# Patient Record
Sex: MALE | Employment: FULL TIME | URBAN - NONMETROPOLITAN AREA
[De-identification: names, ages, dates, MRNs, and addresses within clinical notes are randomized per-mention and may not be internally consistent; named-entity substitution may affect disease eponyms.]

---

## 2017-01-03 ENCOUNTER — TELEPHONE (OUTPATIENT)
Dept: PRIMARY CARE CLINIC | Age: 52
End: 2017-01-03

## 2017-01-05 RX ORDER — VARDENAFIL HYDROCHLORIDE 20 MG/1
20 TABLET ORAL PRN
Qty: 6 TABLET | Refills: 3 | Status: SHIPPED | OUTPATIENT
Start: 2017-01-05 | End: 2018-03-02 | Stop reason: SDUPTHER

## 2017-01-23 RX ORDER — ESOMEPRAZOLE MAGNESIUM 40 MG/1
40 CAPSULE, DELAYED RELEASE ORAL DAILY
Qty: 90 CAPSULE | Refills: 0 | Status: SHIPPED | OUTPATIENT
Start: 2017-01-23 | End: 2017-03-20 | Stop reason: SDUPTHER

## 2017-01-23 RX ORDER — LISINOPRIL 20 MG/1
TABLET ORAL
Qty: 90 TABLET | Refills: 0 | Status: SHIPPED | OUTPATIENT
Start: 2017-01-23 | End: 2017-03-20 | Stop reason: SDUPTHER

## 2017-01-23 RX ORDER — NEBIVOLOL 5 MG/1
TABLET ORAL
Qty: 90 TABLET | Refills: 0 | Status: SHIPPED | OUTPATIENT
Start: 2017-01-23 | End: 2017-03-20 | Stop reason: SDUPTHER

## 2017-03-14 ENCOUNTER — TELEPHONE (OUTPATIENT)
Dept: PRIMARY CARE CLINIC | Age: 52
End: 2017-03-14

## 2017-03-14 DIAGNOSIS — N52.9 ERECTILE DYSFUNCTION, UNSPECIFIED ERECTILE DYSFUNCTION TYPE: ICD-10-CM

## 2017-03-14 DIAGNOSIS — E78.2 MIXED HYPERLIPIDEMIA: ICD-10-CM

## 2017-03-14 DIAGNOSIS — Z00.00 ANNUAL PHYSICAL EXAM: ICD-10-CM

## 2017-03-14 DIAGNOSIS — I10 ESSENTIAL HYPERTENSION: Primary | ICD-10-CM

## 2017-03-14 DIAGNOSIS — I10 ESSENTIAL HYPERTENSION: ICD-10-CM

## 2017-03-14 LAB
ALBUMIN SERPL-MCNC: 4.5 G/DL (ref 3.5–5.2)
ALP BLD-CCNC: 64 U/L (ref 40–130)
ALT SERPL-CCNC: 17 U/L (ref 5–41)
ANION GAP SERPL CALCULATED.3IONS-SCNC: 13 MMOL/L (ref 7–19)
AST SERPL-CCNC: 26 U/L (ref 5–40)
BASOPHILS ABSOLUTE: 0 K/UL (ref 0–0.2)
BASOPHILS RELATIVE PERCENT: 0.2 % (ref 0–1)
BILIRUB SERPL-MCNC: 0.6 MG/DL (ref 0.2–1.2)
BUN BLDV-MCNC: 15 MG/DL (ref 6–20)
CALCIUM SERPL-MCNC: 9.3 MG/DL (ref 8.6–10)
CHLORIDE BLD-SCNC: 103 MMOL/L (ref 98–111)
CHOLESTEROL, TOTAL: 123 MG/DL (ref 160–199)
CO2: 26 MMOL/L (ref 22–29)
CREAT SERPL-MCNC: 0.8 MG/DL (ref 0.5–1.2)
CREATININE URINE: 205.1 MG/DL (ref 4.2–622)
EOSINOPHILS ABSOLUTE: 0.2 K/UL (ref 0–0.6)
EOSINOPHILS RELATIVE PERCENT: 2.4 % (ref 0–5)
GFR NON-AFRICAN AMERICAN: >60
GLOBULIN: 2.9 G/DL
GLUCOSE BLD-MCNC: 99 MG/DL (ref 74–109)
HCT VFR BLD CALC: 44.2 % (ref 42–52)
HDLC SERPL-MCNC: 25 MG/DL (ref 55–121)
HEMOGLOBIN: 14.8 G/DL (ref 14–18)
LDL CHOLESTEROL CALCULATED: 52 MG/DL
LYMPHOCYTES ABSOLUTE: 2.4 K/UL (ref 1.1–4.5)
LYMPHOCYTES RELATIVE PERCENT: 35.9 % (ref 20–40)
MCH RBC QN AUTO: 27.4 PG (ref 27–31)
MCHC RBC AUTO-ENTMCNC: 33.5 G/DL (ref 33–37)
MCV RBC AUTO: 81.7 FL (ref 80–94)
MICROALBUMIN UR-MCNC: 16.9 MG/DL (ref 0–19)
MICROALBUMIN/CREAT UR-RTO: 82.4 MG/G
MONOCYTES ABSOLUTE: 0.6 K/UL (ref 0–0.9)
MONOCYTES RELATIVE PERCENT: 8.5 % (ref 0–10)
NEUTROPHILS ABSOLUTE: 3.5 K/UL (ref 1.5–7.5)
NEUTROPHILS RELATIVE PERCENT: 53 % (ref 50–65)
PDW BLD-RTO: 13.9 % (ref 11.5–14.5)
PLATELET # BLD: 287 K/UL (ref 130–400)
PMV BLD AUTO: 10.2 FL (ref 7.4–10.4)
POTASSIUM SERPL-SCNC: 4.5 MMOL/L (ref 3.5–5)
PROSTATE SPECIFIC ANTIGEN: 0.58 NG/ML (ref 0–4)
RBC # BLD: 5.41 M/UL (ref 4.7–6.1)
SODIUM BLD-SCNC: 142 MMOL/L (ref 136–145)
T4 FREE: 1.2 NG/ML (ref 0.9–1.7)
TOTAL PROTEIN: 7.4 G/DL (ref 6.6–8.7)
TRIGL SERPL-MCNC: 232 MG/DL (ref 150–199)
TSH SERPL DL<=0.05 MIU/L-ACNC: 1.37 UIU/ML (ref 0.27–4.2)
WBC # BLD: 6.6 K/UL (ref 4.8–10.8)

## 2017-03-20 ENCOUNTER — OFFICE VISIT (OUTPATIENT)
Dept: PRIMARY CARE CLINIC | Age: 52
End: 2017-03-20
Payer: OTHER GOVERNMENT

## 2017-03-20 VITALS
BODY MASS INDEX: 30.45 KG/M2 | HEART RATE: 84 BPM | WEIGHT: 237.25 LBS | HEIGHT: 74 IN | DIASTOLIC BLOOD PRESSURE: 96 MMHG | TEMPERATURE: 98.2 F | SYSTOLIC BLOOD PRESSURE: 164 MMHG | OXYGEN SATURATION: 98 %

## 2017-03-20 DIAGNOSIS — I25.10 CORONARY ARTERY DISEASE INVOLVING NATIVE HEART WITHOUT ANGINA PECTORIS, UNSPECIFIED VESSEL OR LESION TYPE: ICD-10-CM

## 2017-03-20 DIAGNOSIS — E78.2 MIXED HYPERLIPIDEMIA: ICD-10-CM

## 2017-03-20 DIAGNOSIS — I10 ESSENTIAL HYPERTENSION: Primary | ICD-10-CM

## 2017-03-20 DIAGNOSIS — L98.9 SKIN LESION: ICD-10-CM

## 2017-03-20 PROCEDURE — 99396 PREV VISIT EST AGE 40-64: CPT | Performed by: NURSE PRACTITIONER

## 2017-03-20 RX ORDER — LISINOPRIL 20 MG/1
20 TABLET ORAL 2 TIMES DAILY
Qty: 180 TABLET | Refills: 3 | Status: SHIPPED | OUTPATIENT
Start: 2017-03-20 | End: 2018-09-08 | Stop reason: SDUPTHER

## 2017-03-20 RX ORDER — ESOMEPRAZOLE MAGNESIUM 40 MG/1
40 CAPSULE, DELAYED RELEASE ORAL DAILY
Qty: 90 CAPSULE | Refills: 3 | Status: SHIPPED | OUTPATIENT
Start: 2017-03-20 | End: 2018-07-10

## 2017-03-20 RX ORDER — NEBIVOLOL 5 MG/1
TABLET ORAL
Qty: 90 TABLET | Refills: 3 | Status: SHIPPED | OUTPATIENT
Start: 2017-03-20 | End: 2017-09-27 | Stop reason: SDUPTHER

## 2017-03-20 ASSESSMENT — ENCOUNTER SYMPTOMS
VOMITING: 0
TROUBLE SWALLOWING: 0
NAUSEA: 0
SORE THROAT: 0
EYE DISCHARGE: 0
SHORTNESS OF BREATH: 0
COUGH: 0
ABDOMINAL PAIN: 0

## 2017-04-26 ENCOUNTER — TELEPHONE (OUTPATIENT)
Dept: PRIMARY CARE CLINIC | Age: 52
End: 2017-04-26

## 2017-07-21 ENCOUNTER — OFFICE VISIT (OUTPATIENT)
Dept: PRIMARY CARE CLINIC | Age: 52
End: 2017-07-21
Payer: OTHER GOVERNMENT

## 2017-07-21 VITALS
DIASTOLIC BLOOD PRESSURE: 80 MMHG | HEART RATE: 72 BPM | HEIGHT: 74 IN | WEIGHT: 227.5 LBS | TEMPERATURE: 98.4 F | SYSTOLIC BLOOD PRESSURE: 130 MMHG | OXYGEN SATURATION: 98 % | BODY MASS INDEX: 29.2 KG/M2

## 2017-07-21 DIAGNOSIS — R19.7 DIARRHEA OF PRESUMED INFECTIOUS ORIGIN: ICD-10-CM

## 2017-07-21 DIAGNOSIS — I10 ESSENTIAL HYPERTENSION: ICD-10-CM

## 2017-07-21 DIAGNOSIS — R10.84 GENERALIZED ABDOMINAL PAIN: Primary | ICD-10-CM

## 2017-07-21 LAB
ANION GAP SERPL CALCULATED.3IONS-SCNC: 12 MMOL/L (ref 7–19)
BUN BLDV-MCNC: 19 MG/DL (ref 6–20)
CALCIUM SERPL-MCNC: 8.9 MG/DL (ref 8.6–10)
CHLORIDE BLD-SCNC: 101 MMOL/L (ref 98–111)
CO2: 23 MMOL/L (ref 22–29)
CREAT SERPL-MCNC: 1.3 MG/DL (ref 0.5–1.2)
GFR NON-AFRICAN AMERICAN: 58
GLUCOSE BLD-MCNC: 76 MG/DL (ref 74–109)
POTASSIUM SERPL-SCNC: 4 MMOL/L (ref 3.5–5)
SODIUM BLD-SCNC: 136 MMOL/L (ref 136–145)

## 2017-07-21 PROCEDURE — 99213 OFFICE O/P EST LOW 20 MIN: CPT | Performed by: NURSE PRACTITIONER

## 2017-07-21 RX ORDER — TRIAMTERENE AND HYDROCHLOROTHIAZIDE 37.5; 25 MG/1; MG/1
1 CAPSULE ORAL EVERY MORNING
COMMUNITY
Start: 2017-04-28 | End: 2019-07-02 | Stop reason: SDUPTHER

## 2017-07-21 RX ORDER — CLOPIDOGREL BISULFATE 75 MG/1
TABLET ORAL
COMMUNITY
Start: 2017-04-28 | End: 2017-07-21

## 2017-07-21 ASSESSMENT — ENCOUNTER SYMPTOMS
EYE REDNESS: 0
RHINORRHEA: 0
VOMITING: 0
COUGH: 0
ABDOMINAL PAIN: 0
DIARRHEA: 0
SHORTNESS OF BREATH: 0
NAUSEA: 0
SORE THROAT: 0
CONSTIPATION: 0

## 2017-07-24 ENCOUNTER — TELEPHONE (OUTPATIENT)
Dept: PRIMARY CARE CLINIC | Age: 52
End: 2017-07-24

## 2017-08-10 ENCOUNTER — TELEPHONE (OUTPATIENT)
Dept: PRIMARY CARE CLINIC | Age: 52
End: 2017-08-10

## 2017-08-10 DIAGNOSIS — R79.89 ELEVATED SERUM CREATININE: Primary | ICD-10-CM

## 2017-08-11 ENCOUNTER — TELEPHONE (OUTPATIENT)
Dept: PRIMARY CARE CLINIC | Age: 52
End: 2017-08-11

## 2017-08-11 DIAGNOSIS — R79.89 ELEVATED SERUM CREATININE: ICD-10-CM

## 2017-08-11 LAB
ALBUMIN SERPL-MCNC: 4.2 G/DL (ref 3.5–5.2)
ALP BLD-CCNC: 60 U/L (ref 40–130)
ALT SERPL-CCNC: 24 U/L (ref 5–41)
ANION GAP SERPL CALCULATED.3IONS-SCNC: 19 MMOL/L (ref 7–19)
AST SERPL-CCNC: 23 U/L (ref 5–40)
BILIRUB SERPL-MCNC: 0.6 MG/DL (ref 0.2–1.2)
BUN BLDV-MCNC: 14 MG/DL (ref 6–20)
CALCIUM SERPL-MCNC: 9.6 MG/DL (ref 8.6–10)
CHLORIDE BLD-SCNC: 103 MMOL/L (ref 98–111)
CO2: 22 MMOL/L (ref 22–29)
CREAT SERPL-MCNC: 1 MG/DL (ref 0.5–1.2)
GFR NON-AFRICAN AMERICAN: >60
GLUCOSE BLD-MCNC: 120 MG/DL (ref 74–109)
POTASSIUM SERPL-SCNC: 4 MMOL/L (ref 3.5–5)
SODIUM BLD-SCNC: 144 MMOL/L (ref 136–145)
TOTAL PROTEIN: 7.4 G/DL (ref 6.6–8.7)

## 2017-09-27 ENCOUNTER — OFFICE VISIT (OUTPATIENT)
Dept: PRIMARY CARE CLINIC | Age: 52
End: 2017-09-27
Payer: OTHER GOVERNMENT

## 2017-09-27 VITALS
HEART RATE: 63 BPM | WEIGHT: 223.5 LBS | TEMPERATURE: 98.6 F | DIASTOLIC BLOOD PRESSURE: 90 MMHG | BODY MASS INDEX: 28.68 KG/M2 | SYSTOLIC BLOOD PRESSURE: 140 MMHG | OXYGEN SATURATION: 98 % | HEIGHT: 74 IN

## 2017-09-27 DIAGNOSIS — G47.19 EXCESSIVE DAYTIME SLEEPINESS: ICD-10-CM

## 2017-09-27 DIAGNOSIS — R06.83 SNORING: ICD-10-CM

## 2017-09-27 DIAGNOSIS — I10 ESSENTIAL HYPERTENSION: Primary | ICD-10-CM

## 2017-09-27 PROCEDURE — 99213 OFFICE O/P EST LOW 20 MIN: CPT | Performed by: NURSE PRACTITIONER

## 2017-09-27 RX ORDER — NEBIVOLOL 10 MG/1
TABLET ORAL
Qty: 90 TABLET | Refills: 3 | Status: SHIPPED | OUTPATIENT
Start: 2017-09-27 | End: 2018-10-14 | Stop reason: SDUPTHER

## 2017-09-27 ASSESSMENT — ENCOUNTER SYMPTOMS
NAUSEA: 0
ABDOMINAL PAIN: 0
SHORTNESS OF BREATH: 0
SORE THROAT: 0
TROUBLE SWALLOWING: 0
RHINORRHEA: 0
CONSTIPATION: 0
DIARRHEA: 0
COUGH: 0
VOMITING: 0

## 2018-03-05 RX ORDER — VARDENAFIL HYDROCHLORIDE 20 MG/1
20 TABLET ORAL PRN
Qty: 6 TABLET | Refills: 3 | Status: SHIPPED | OUTPATIENT
Start: 2018-03-05 | End: 2018-07-10

## 2018-07-10 ENCOUNTER — OFFICE VISIT (OUTPATIENT)
Dept: PRIMARY CARE CLINIC | Age: 53
End: 2018-07-10
Payer: OTHER GOVERNMENT

## 2018-07-10 ENCOUNTER — TELEPHONE (OUTPATIENT)
Dept: PRIMARY CARE CLINIC | Age: 53
End: 2018-07-10

## 2018-07-10 VITALS
SYSTOLIC BLOOD PRESSURE: 130 MMHG | HEART RATE: 82 BPM | WEIGHT: 220 LBS | DIASTOLIC BLOOD PRESSURE: 80 MMHG | BODY MASS INDEX: 28.23 KG/M2 | HEIGHT: 74 IN | TEMPERATURE: 98 F | OXYGEN SATURATION: 99 %

## 2018-07-10 DIAGNOSIS — E78.2 MIXED HYPERLIPIDEMIA: ICD-10-CM

## 2018-07-10 DIAGNOSIS — R79.89 CREATININE ELEVATION: ICD-10-CM

## 2018-07-10 DIAGNOSIS — Z12.5 SCREENING PSA (PROSTATE SPECIFIC ANTIGEN): ICD-10-CM

## 2018-07-10 DIAGNOSIS — I10 ESSENTIAL HYPERTENSION: ICD-10-CM

## 2018-07-10 DIAGNOSIS — R10.31 RIGHT LOWER QUADRANT ABDOMINAL PAIN: Primary | ICD-10-CM

## 2018-07-10 LAB
ALBUMIN SERPL-MCNC: 4.4 G/DL (ref 3.5–5.2)
ALP BLD-CCNC: 55 U/L (ref 40–130)
ALT SERPL-CCNC: 16 U/L (ref 5–41)
ANION GAP SERPL CALCULATED.3IONS-SCNC: 16 MMOL/L (ref 7–19)
AST SERPL-CCNC: 20 U/L (ref 5–40)
BASOPHILS ABSOLUTE: 0 K/UL (ref 0–0.2)
BASOPHILS RELATIVE PERCENT: 0.5 % (ref 0–1)
BILIRUB SERPL-MCNC: 0.3 MG/DL (ref 0.2–1.2)
BUN BLDV-MCNC: 22 MG/DL (ref 6–20)
CALCIUM SERPL-MCNC: 9.7 MG/DL (ref 8.6–10)
CHLORIDE BLD-SCNC: 101 MMOL/L (ref 98–111)
CHOLESTEROL, TOTAL: 128 MG/DL (ref 160–199)
CO2: 23 MMOL/L (ref 22–29)
CREAT SERPL-MCNC: 1.1 MG/DL (ref 0.5–1.2)
EOSINOPHILS ABSOLUTE: 0.5 K/UL (ref 0–0.6)
EOSINOPHILS RELATIVE PERCENT: 6.5 % (ref 0–5)
GFR NON-AFRICAN AMERICAN: >60
GLUCOSE BLD-MCNC: 97 MG/DL (ref 74–109)
HCT VFR BLD CALC: 39.9 % (ref 42–52)
HDLC SERPL-MCNC: 27 MG/DL (ref 55–121)
HEMOGLOBIN: 12.8 G/DL (ref 14–18)
LDL CHOLESTEROL CALCULATED: 53 MG/DL
LYMPHOCYTES ABSOLUTE: 1.9 K/UL (ref 1.1–4.5)
LYMPHOCYTES RELATIVE PERCENT: 26.2 % (ref 20–40)
MCH RBC QN AUTO: 26.9 PG (ref 27–31)
MCHC RBC AUTO-ENTMCNC: 32.1 G/DL (ref 33–37)
MCV RBC AUTO: 83.8 FL (ref 80–94)
MONOCYTES ABSOLUTE: 0.5 K/UL (ref 0–0.9)
MONOCYTES RELATIVE PERCENT: 7.2 % (ref 0–10)
NEUTROPHILS ABSOLUTE: 4.4 K/UL (ref 1.5–7.5)
NEUTROPHILS RELATIVE PERCENT: 58.9 % (ref 50–65)
PDW BLD-RTO: 14.6 % (ref 11.5–14.5)
PLATELET # BLD: 276 K/UL (ref 130–400)
PMV BLD AUTO: 10 FL (ref 9.4–12.4)
POTASSIUM SERPL-SCNC: 4.5 MMOL/L (ref 3.5–5)
PROSTATE SPECIFIC ANTIGEN: 0.72 NG/ML (ref 0–4)
RBC # BLD: 4.76 M/UL (ref 4.7–6.1)
SODIUM BLD-SCNC: 140 MMOL/L (ref 136–145)
T4 FREE: 1.2 NG/DL (ref 0.9–1.7)
TOTAL PROTEIN: 7.2 G/DL (ref 6.6–8.7)
TRIGL SERPL-MCNC: 241 MG/DL (ref 0–149)
TSH SERPL DL<=0.05 MIU/L-ACNC: 1.05 UIU/ML (ref 0.27–4.2)
WBC # BLD: 7.4 K/UL (ref 4.8–10.8)

## 2018-07-10 PROCEDURE — 99214 OFFICE O/P EST MOD 30 MIN: CPT | Performed by: NURSE PRACTITIONER

## 2018-07-10 RX ORDER — PANTOPRAZOLE SODIUM 20 MG/1
40 TABLET, DELAYED RELEASE ORAL DAILY
COMMUNITY
End: 2019-06-05 | Stop reason: DRUGHIGH

## 2018-07-10 ASSESSMENT — ENCOUNTER SYMPTOMS
EYE DISCHARGE: 0
SORE THROAT: 0
RHINORRHEA: 0
BLOOD IN STOOL: 0
CONSTIPATION: 0
WHEEZING: 0
CHOKING: 0
EYE REDNESS: 0
ABDOMINAL PAIN: 1
COUGH: 0
DIARRHEA: 1

## 2018-07-10 NOTE — PROGRESS NOTES
Jeanette [address]  [place], [address] [place], [place] [address]  Phone [phone]   Fax [phone]      OFFICE VISIT: 7/10/2018    Danish Woodson is a 45 y.o. male who presents today for his medical conditions/complaints as noted below. Danish Woodson is c/o of Abdominal Pain (right sided abd pain ) and Follow-Up from [facility])        HPI:     Hypertension   This is a chronic problem. The current episode started more than 1 year ago. The problem is controlled. Pertinent negatives include no chest pain. Past treatments include beta blockers. There are no compliance problems. Went to the ER [facility]/2/[facility] ER  Had CT abd and pelvis  Didn't really see anything acute  CT impression. Has colonic diverticulosis. No stones  Normal appendix. Was dehydrated. -cr was elevated at 1.5  Had diarrhea during the past week, but more solid now. No nausea  No blood in the stool. Pain comes and goes. No fever or chills. Just haven't felt good. Drained    Past Medical History:   Diagnosis Date    ED (erectile dysfunction)     GERD (gastroesophageal reflux disease)     Hyperlipidemia     Hypertension       Past Surgical History:   Procedure Laterality Date    AMPUTATION Left     BACK SURGERY         No family history on file. Social History   Substance Use Topics    Smoking status: Former Smoker    Smokeless tobacco: Never Used    Alcohol use Yes      Comment: rare      Current Outpatient Prescriptions   Medication Sig Dispense Refill    pantoprazole (PROTONIX) 20 MG tablet Take 20 mg by mouth daily      nebivolol (BYSTOLIC) 10 MG tablet TAKE 1 TABLET BY MOUTH DAILY. 90 tablet 3    triamterene-hydrochlorothiazide (DYAZIDE) 37.5-25 MG per capsule Take 1 capsule by mouth every morning       lisinopril (PRINIVIL;ZESTRIL) 20 MG tablet Take 1 tablet by mouth 2 times daily 180 tablet 3    aspirin 81 MG tablet Take 81 mg by mouth daily. No current facility-administered medications for this visit.       No Known 99%   BMI 28.25 kg/m²     Assessment:        ICD-10-CM ICD-9-CM    1. Right lower quadrant abdominal pain R10.31 789.03 CANCELED: CBC Auto Differential      CANCELED: Comprehensive Metabolic Panel   2. Essential hypertension I10 401.9    3. Mixed hyperlipidemia E78.2 272.2 T4, Free      TSH without Reflex      CBC Auto Differential      Comprehensive Metabolic Panel      Lipid Panel   4. Screening PSA (prostate specific antigen) Z12.5 V76.44 PSA Screening   5. Creatinine elevation R79.89 790.99        Plan:    ER records requested and reviewed. Scanned in chart. [address] lab work. abd pain considerations- colitis, gastroenteritis due to diarrhea,    improving- nothing acute on the Ct.   contineu to monitor. Return if symptoms worsen or fail to improve. Orders Placed This Encounter   Procedures    T4, Free     Standing Status:   Future     Number of Occurrences:   1     Standing Expiration Date:   7/10/2019    TSH without Reflex     Standing Status:   Future     Number of Occurrences:   1     Standing Expiration Date:   7/10/2019    CBC Auto Differential     Standing Status:   Future     Number of Occurrences:   1     Standing Expiration Date:   7/10/2019    Comprehensive Metabolic Panel     Standing Status:   Future     Number of Occurrences:   1     Standing Expiration Date:   7/10/2019    Lipid Panel     Standing Status:   Future     Number of Occurrences:   1     Standing Expiration Date:   7/10/2019     Order Specific Question:   Is Patient Fasting?/# of Hours     Answer:   10-12 hours    PSA Screening     Standing Status:   Future     Number of Occurrences:   1     Standing Expiration Date:   7/10/2019     No orders of the defined types were placed in this encounter. Discussed use, benefit, and side effects of prescribed medications. All patient questions answered. Pt voiced understanding. Reviewed health maintenance. .  Patient agreed with treatment plan. Follow up as directed. There are no Patient Instructions on file for this visit.       Electronically signed by JOSÉ MIGUEL Varghese on 7/10/2018 at 10:27 AM

## 2018-07-10 NOTE — TELEPHONE ENCOUNTER
Called patient, spoke with: Patient regarding the results of the patients most recent labs. I advised Patient of Opal Parks recommendations. Patient did voice understanding.

## 2018-07-10 NOTE — TELEPHONE ENCOUNTER
----- Message from JOSÉ MIGUEL Lozada sent at 7/10/2018  2:02 PM CDT -----  Please inform patient results show  cmp is normal- kidney function has returned to normal.  Lipids normal  psa normal  Thyroid normal  A little decrease in [place] but this is increased from the ER  Have him do a fit test.

## 2018-07-11 ENCOUNTER — PROCEDURE VISIT (OUTPATIENT)
Dept: PRIMARY CARE CLINIC | Age: 53
End: 2018-07-11
Payer: OTHER GOVERNMENT

## 2018-07-11 DIAGNOSIS — Z12.11 SCREENING FOR COLON CANCER: Primary | ICD-10-CM

## 2018-07-11 LAB
CONTROL: NORMAL
HEMOCCULT STL QL: NEGATIVE

## 2018-07-11 PROCEDURE — 82274 ASSAY TEST FOR BLOOD FECAL: CPT | Performed by: PEDIATRICS

## 2018-07-12 ENCOUNTER — TELEPHONE (OUTPATIENT)
Dept: PRIMARY CARE CLINIC | Age: 53
End: 2018-07-12

## 2018-07-12 NOTE — TELEPHONE ENCOUNTER
----- Message from JOSÉ MIGUEL Magaña sent at 7/12/2018  7:56 AM CDT -----  Please call patient and let them know results.    Fit testing negative

## 2018-09-08 DIAGNOSIS — I10 ESSENTIAL HYPERTENSION: ICD-10-CM

## 2018-09-10 RX ORDER — LISINOPRIL 20 MG/1
20 TABLET ORAL 2 TIMES DAILY
Qty: 180 TABLET | Refills: 3 | Status: SHIPPED | OUTPATIENT
Start: 2018-09-10 | End: 2020-02-24

## 2018-09-10 NOTE — TELEPHONE ENCOUNTER
Received fax from pharmacy requesting refill on pts medication(s). Pt was last seen in office on 7/10/2018  and has a follow up scheduled for Visit date not found. Will send request to  Dr. Shelbie Landon  for authorization.      Requested Prescriptions     Pending Prescriptions Disp Refills    lisinopril (PRINIVIL;ZESTRIL) 20 MG tablet [Pharmacy Med Name: LISINOPRIL TABS 20MG] 180 tablet 3     Sig: Take 1 tablet by mouth 2 times daily

## 2018-10-14 DIAGNOSIS — I10 ESSENTIAL HYPERTENSION: ICD-10-CM

## 2018-10-15 RX ORDER — NEBIVOLOL 10 MG/1
10 TABLET ORAL DAILY
Qty: 90 TABLET | Refills: 3 | Status: SHIPPED | OUTPATIENT
Start: 2018-10-15 | End: 2019-10-28 | Stop reason: SDUPTHER

## 2018-12-03 RX ORDER — PANTOPRAZOLE SODIUM 40 MG/1
40 TABLET, DELAYED RELEASE ORAL DAILY
Qty: 90 TABLET | Refills: 3 | Status: SHIPPED | OUTPATIENT
Start: 2018-12-03 | End: 2019-10-25 | Stop reason: SDUPTHER

## 2018-12-03 NOTE — TELEPHONE ENCOUNTER
Received fax from pharmacy requesting refill on pts medication(s). Pt was last seen in office on 7/10/2018  and has a follow up scheduled for Visit date not found. Will send request to  [facility]  for patient.      Requested Prescriptions     Pending Prescriptions Disp Refills    pantoprazole (PROTONIX) 40 MG tablet [Pharmacy Med Name: PANTOPRAZOLE SOD DR TABS 40MG] 90 tablet 3     Sig: Take 1 tablet by mouth daily

## 2019-05-29 ENCOUNTER — TELEPHONE (OUTPATIENT)
Dept: PRIMARY CARE CLINIC | Age: 54
End: 2019-05-29

## 2019-05-29 DIAGNOSIS — I10 ESSENTIAL HYPERTENSION: Primary | ICD-10-CM

## 2019-05-29 DIAGNOSIS — E78.2 MIXED HYPERLIPIDEMIA: ICD-10-CM

## 2019-05-29 NOTE — TELEPHONE ENCOUNTER
Leeann Arguello has an upcoming appt on 6/5/2019. Patient is wanting to have labs prior to this appointment, please ensure active orders are available as I have made him aware of our walk-in hours for the lab. Please contact patient to advise. Thank you.

## 2019-05-31 ENCOUNTER — OUTSIDE FACILITY SERVICE (OUTPATIENT)
Dept: CARDIOLOGY | Facility: CLINIC | Age: 54
End: 2019-05-31

## 2019-05-31 PROCEDURE — 93306 TTE W/DOPPLER COMPLETE: CPT | Performed by: INTERNAL MEDICINE

## 2019-06-03 ENCOUNTER — TELEPHONE (OUTPATIENT)
Dept: PRIMARY CARE CLINIC | Age: 54
End: 2019-06-03

## 2019-06-03 DIAGNOSIS — E78.2 MIXED HYPERLIPIDEMIA: ICD-10-CM

## 2019-06-03 DIAGNOSIS — I10 ESSENTIAL HYPERTENSION: ICD-10-CM

## 2019-06-03 DIAGNOSIS — R89.9 ABNORMAL LABORATORY TEST RESULT: Primary | ICD-10-CM

## 2019-06-03 LAB
ALBUMIN SERPL-MCNC: 4.4 G/DL (ref 3.5–5.2)
ALP BLD-CCNC: 69 U/L (ref 40–130)
ALT SERPL-CCNC: 37 U/L (ref 5–41)
ANION GAP SERPL CALCULATED.3IONS-SCNC: 15 MMOL/L (ref 7–19)
AST SERPL-CCNC: 33 U/L (ref 5–40)
BASOPHILS ABSOLUTE: 0 K/UL (ref 0–0.2)
BASOPHILS RELATIVE PERCENT: 0.6 % (ref 0–1)
BILIRUB SERPL-MCNC: 0.3 MG/DL (ref 0.2–1.2)
BUN BLDV-MCNC: 27 MG/DL (ref 6–20)
CALCIUM SERPL-MCNC: 9.5 MG/DL (ref 8.6–10)
CHLORIDE BLD-SCNC: 105 MMOL/L (ref 98–111)
CHOLESTEROL, TOTAL: 166 MG/DL (ref 160–199)
CO2: 22 MMOL/L (ref 22–29)
CREAT SERPL-MCNC: 1.2 MG/DL (ref 0.5–1.2)
EOSINOPHILS ABSOLUTE: 0.4 K/UL (ref 0–0.6)
EOSINOPHILS RELATIVE PERCENT: 5.3 % (ref 0–5)
GFR NON-AFRICAN AMERICAN: >60
GLUCOSE BLD-MCNC: 108 MG/DL (ref 74–109)
HCT VFR BLD CALC: 38.3 % (ref 42–52)
HDLC SERPL-MCNC: 18 MG/DL (ref 55–121)
HEMOGLOBIN: 12 G/DL (ref 14–18)
LDL CHOLESTEROL CALCULATED: ABNORMAL MG/DL
LDL CHOLESTEROL DIRECT: 49 MG/DL
LYMPHOCYTES ABSOLUTE: 2.2 K/UL (ref 1.1–4.5)
LYMPHOCYTES RELATIVE PERCENT: 30.6 % (ref 20–40)
MCH RBC QN AUTO: 28 PG (ref 27–31)
MCHC RBC AUTO-ENTMCNC: 31.3 G/DL (ref 33–37)
MCV RBC AUTO: 89.5 FL (ref 80–94)
MONOCYTES ABSOLUTE: 0.5 K/UL (ref 0–0.9)
MONOCYTES RELATIVE PERCENT: 7.1 % (ref 0–10)
NEUTROPHILS ABSOLUTE: 4 K/UL (ref 1.5–7.5)
NEUTROPHILS RELATIVE PERCENT: 55.3 % (ref 50–65)
PDW BLD-RTO: 14.5 % (ref 11.5–14.5)
PLATELET # BLD: 288 K/UL (ref 130–400)
PMV BLD AUTO: 10.4 FL (ref 9.4–12.4)
POTASSIUM SERPL-SCNC: 5 MMOL/L (ref 3.5–5)
RBC # BLD: 4.28 M/UL (ref 4.7–6.1)
SODIUM BLD-SCNC: 142 MMOL/L (ref 136–145)
T4 FREE: 1 NG/DL (ref 0.9–1.7)
TOTAL PROTEIN: 7.1 G/DL (ref 6.6–8.7)
TRIGL SERPL-MCNC: 740 MG/DL (ref 0–149)
TSH SERPL DL<=0.05 MIU/L-ACNC: 1.43 UIU/ML (ref 0.27–4.2)
WBC # BLD: 7.2 K/UL (ref 4.8–10.8)

## 2019-06-03 NOTE — TELEPHONE ENCOUNTER
----- Message from [address] [facility],[address], DO sent at 6/3/2019 12:50 PM CDT -----  Thyroid values are normal.  TriGlycerides are extremely high. Used to be on fenofibrate. Recommend restarting this medication. Your metabolic profile is normal.  This includes kidney and liver functions as well as electrolytes. CBc shows anemia.   Recommend fit test, iron, TIBC, ferritin, folate, B12 and repeat CBC

## 2019-06-03 NOTE — TELEPHONE ENCOUNTER
----- Message from [id number] [facility],[address], DO sent at 6/3/2019  1:25 PM CDT -----  LDL cholesterol is normal

## 2019-06-05 ENCOUNTER — OFFICE VISIT (OUTPATIENT)
Dept: PRIMARY CARE CLINIC | Age: 54
End: 2019-06-05
Payer: OTHER GOVERNMENT

## 2019-06-05 VITALS
OXYGEN SATURATION: 97 % | DIASTOLIC BLOOD PRESSURE: 80 MMHG | TEMPERATURE: 98.5 F | SYSTOLIC BLOOD PRESSURE: 128 MMHG | HEIGHT: 73 IN | HEART RATE: 60 BPM | WEIGHT: 244.5 LBS | BODY MASS INDEX: 32.4 KG/M2

## 2019-06-05 DIAGNOSIS — I10 ESSENTIAL HYPERTENSION: ICD-10-CM

## 2019-06-05 DIAGNOSIS — E78.2 MIXED HYPERLIPIDEMIA: ICD-10-CM

## 2019-06-05 DIAGNOSIS — Z12.5 SCREENING FOR PROSTATE CANCER: ICD-10-CM

## 2019-06-05 DIAGNOSIS — D64.9 ANEMIA, UNSPECIFIED TYPE: ICD-10-CM

## 2019-06-05 DIAGNOSIS — N52.9 ERECTILE DYSFUNCTION, UNSPECIFIED ERECTILE DYSFUNCTION TYPE: ICD-10-CM

## 2019-06-05 DIAGNOSIS — I25.10 CORONARY ARTERY DISEASE INVOLVING NATIVE HEART WITHOUT ANGINA PECTORIS, UNSPECIFIED VESSEL OR LESION TYPE: ICD-10-CM

## 2019-06-05 DIAGNOSIS — Z00.00 VISIT FOR PREVENTIVE HEALTH EXAMINATION: Primary | ICD-10-CM

## 2019-06-05 PROCEDURE — 99396 PREV VISIT EST AGE 40-64: CPT | Performed by: PEDIATRICS

## 2019-06-05 RX ORDER — TADALAFIL 20 MG/1
20 TABLET ORAL DAILY PRN
Qty: 10 TABLET | Refills: 5 | Status: SHIPPED | OUTPATIENT
Start: 2019-06-05 | End: 2019-08-23 | Stop reason: ALTCHOICE

## 2019-06-05 ASSESSMENT — ENCOUNTER SYMPTOMS
CONSTIPATION: 0
COUGH: 0
SORE THROAT: 0
WHEEZING: 0
ABDOMINAL PAIN: 0
EYES NEGATIVE: 1
VOMITING: 0
GASTROINTESTINAL NEGATIVE: 1
SHORTNESS OF BREATH: 0
SINUS PRESSURE: 0
CHEST TIGHTNESS: 0
RESPIRATORY NEGATIVE: 1
RHINORRHEA: 0
ROS SKIN COMMENTS: NO NEW OR SUSPICIOUS SKIN LESIONS
NAUSEA: 0
BACK PAIN: 0
ALLERGIC/IMMUNOLOGIC NEGATIVE: 1
DIARRHEA: 0
TROUBLE SWALLOWING: 0

## 2019-06-05 ASSESSMENT — PATIENT HEALTH QUESTIONNAIRE - PHQ9
2. FEELING DOWN, DEPRESSED OR HOPELESS: 0
1. LITTLE INTEREST OR PLEASURE IN DOING THINGS: 0
SUM OF ALL RESPONSES TO PHQ QUESTIONS 1-9: 0
SUM OF ALL RESPONSES TO PHQ QUESTIONS 1-9: 0
SUM OF ALL RESPONSES TO PHQ9 QUESTIONS 1 & 2: 0

## 2019-06-05 NOTE — PATIENT INSTRUCTIONS
Patient Education        Well Visit, Men 48 to 72: Care Instructions  Your Care Instructions    Physical exams can help you stay healthy. Your doctor has checked your overall health and may have suggested ways to take good care of yourself. He or she also may have recommended tests. At home, you can help prevent illness with healthy eating, regular exercise, and other steps. Follow-up care is a key part of your treatment and safety. Be sure to make and go to all appointments, and call your doctor if you are having problems. It's also a good idea to know your test results and keep a list of the medicines you take. How can you care for yourself at home? · Reach and stay at a healthy weight. This will lower your risk for many problems, such as obesity, diabetes, heart disease, and high blood pressure. · Get at least 30 minutes of exercise on most days of the week. Walking is a good choice. You also may want to do other activities, such as running, swimming, cycling, or playing tennis or team sports. · Do not smoke. Smoking can make health problems worse. If you need help quitting, talk to your doctor about stop-smoking programs and medicines. These can increase your chances of quitting for good. · Protect your skin from too much sun. When you're outdoors from 10 a.m. to 4 p.m., stay in the shade or cover up with clothing and a hat with a wide brim. Wear sunglasses that block UV rays. Even when it's cloudy, put broad-spectrum sunscreen (SPF 30 or higher) on any exposed skin. · See a dentist one or two times a year for checkups and to have your teeth cleaned. · Wear a seat belt in the car. Follow your doctor's advice about when to have certain tests. These tests can spot problems early. · Cholesterol. Your doctor will tell you how often to have this done based on your overall health and other things that can increase your risk for heart attack and stroke. · Blood pressure.  Have your blood pressure checked during a routine doctor visit. Your doctor will tell you how often to check your blood pressure based on your age, your blood pressure results, and other factors. · Prostate exam. Talk to your doctor about whether you should have a blood test (called a PSA test) for prostate cancer. Experts recommend that you discuss the benefits and risks of the test with your doctor before you decide whether to have this test.  · Diabetes. Ask your doctor whether you should have tests for diabetes. · Vision. Some experts recommend that you have yearly exams for glaucoma and other age-related eye problems starting at age 48. · Hearing. Tell your doctor if you notice any change in your hearing. You can have tests to find out how well you hear. · Colorectal cancer. Your risk for colorectal cancer gets higher as you get older. Some experts say that adults should start regular screening at age 48 and stop at age 76. Others say to start before age 48 or continue after age 76. Talk with your doctor about your risk and when to start and stop screening. · Heart attack and stroke risk. At least every 4 to 6 years, you should have your risk for heart attack and stroke assessed. Your doctor uses factors such as your age, blood pressure, cholesterol, and whether you smoke or have diabetes to show what your risk for a heart attack or stroke is over the next 10 years. · Abdominal aortic aneurysm. Ask your doctor whether you should have a test to check for an aneurysm. You may need a test if you ever smoked or if your parent, brother, sister, or child has had an aneurysm. When should you call for help? Watch closely for changes in your health, and be sure to contact your doctor if you have any problems or symptoms that concern you. Where can you learn more? Go to https://yue.[employer]idimax. org and sign in to your [employer] account.  Enter W052 in the [facility] box to learn more about \"Well Visit, Men 48 to 72: Care Instructions. \"     If you do not have an account, please click on the \"Sign Up Now\" link. Current as of: December 13, 2018  Content Version: 12.0  © 0700-6649 Healthwise, Incorporated. Care instructions adapted under license by [facility] ([facility]). If you have questions about a medical condition or this instruction, always ask your healthcare professional. [address] any warranty or liability for your use of this information.

## 2019-06-05 NOTE — PROGRESS NOTES
[address], [address]  Phone [phone]   Fax [phone]      OFFICE VISIT: 2019    Leeann Arguello [place]-: 1965      HPI  Reason For Visit:  Leeann Arguello is a 52 y.o. Health Maintenance    Annual Exam; Discuss Labs; and Health Maintenance (Hep C screen, HIV Screen, Shingles shot)    Patient presents for routine physical exam.  Present concerns:   none      Hypertension:   BP today was   BP Readings from Last 1 Encounters:   19 128/80      Recent BP readings:    BP Readings from Last 3 Encounters:   19 128/80   07/10/18 130/80   17 (!) 140/90     Medication   Bystolic 10 mg daily   Lisinopril 20 mg daily   Triamterene-hydrochlorothiazide 37.5-25 milligrams daily  Medication compliance:  compliant most of the time  Home blood pressure monitoring: No.    He is not adherent to a low sodium diet. Symptoms: none    CAD  No chest pain or problem  He just had an echo a couple weeks ago   following with cardiology      GERD:  Medication:   Protonix 40 mg daily  Symptoms:doing well with medication    Laboratory:  Lab Results   Component Value Date    BUN 27 (H) 2019    CREATININE 1.2 2019          height is 6' 1.25\" (1.861 m) and weight is 244 lb 8 oz (110.9 kg). His temporal temperature is 98.5 °F (36.9 °C). His blood pressure is 128/80 and his pulse is 60. His oxygen saturation is 97%. Body mass index is 32.04 kg/m².     I have reviewed the following with the Mr. Stefani Weems   Lab Review  Orders Only on 2019   Component Date Value    T4 Free 2019 1.0     TSH 2019 1.430     Cholesterol, Total 2019 166     Triglycerides 2019 740*    HDL 2019 18*    LDL Calculated 2019 see below     Sodium 2019 142     Potassium 2019 5.0     Chloride 2019 105     CO2 2019 22     Anion Gap 2019 15     Glucose 2019 108     BUN 2019 27*    CREATININE 2019 1.2     GFR Pressure Mother     Diabetes Mother         borderline    Heart Attack Father     Kidney Disease Father     Heart Disease Father     Other Father         adbominal issues    Cancer Sister         Cervical     Heart Attack Paternal Grandmother     Heart Attack Paternal Grandfather        Past Surgical History:   Procedure Laterality Date    AMPUTATION Left     BACK SURGERY      CARDIAC SURGERY      CORONARY ANGIOPLASTY WITH STENT PLACEMENT  03/14/2016    SHOULDER SURGERY Right 08/02/2018    Dr Elissa Soto History     Tobacco Use    Smoking status: Former Smoker     Packs/day: 1.00     Years: 25.00     Pack years: 25.00     Types: Cigarettes     Last attempt to quit: 2016     Years since quitting: 3.4    Smokeless tobacco: Never Used   Substance Use Topics    Alcohol use: Yes     Comment: rare        Review of Systems   Constitutional: Negative. Negative for appetite change, chills, diaphoresis, fatigue, fever and unexpected weight change. HENT: Negative. Negative for dental problem (following with dentist regularly), ear pain, hearing loss, postnasal drip, rhinorrhea, sinus pressure, sore throat, tinnitus and trouble swallowing. Eyes: Negative. Negative for visual disturbance (following with regular eye exams). Respiratory: Negative. Negative for cough, chest tightness, shortness of breath and wheezing. No orthopnea   Cardiovascular: Negative. Negative for chest pain (or pressure), palpitations and leg swelling. Gastrointestinal: Negative. Negative for abdominal pain, constipation, diarrhea, nausea and vomiting. No melena or hematochezia   Endocrine: Negative. Negative for cold intolerance, heat intolerance, polydipsia and polyuria. Genitourinary: Negative. Negative for difficulty urinating, dysuria and enuresis. Musculoskeletal: Negative. Negative for arthralgias, back pain, joint swelling and myalgias. Skin: Negative. Negative for rash.         No new or suspicious skin lesions   Allergic/Immunologic: Negative. Neurological: Negative. Negative for dizziness, tremors, syncope (or presyncope), weakness, light-headedness and headaches. Hematological: Negative. Negative for adenopathy. Does not bruise/bleed easily. Psychiatric/Behavioral: Negative. Eyes: no  Dentist:  planning  Skin:  no  Labs:  Done   PSA: due  Nocturia:  1-2x   Stream: nml. ED:  At times  Colonoscopy:  utd  Exercise: no, but active  Diet and Nut:   no  MVI:  no  Supplements:  none  Asa: yes  Depression:  no  Body Image: could stand to lose wt. Fall:  no  EOL:  No, but recommended  Tobacco: no   Substance: no  Immunization:  utd  Sleep: no issue  Cognition: intact. Health Maintenance: as above       Physical Exam   Constitutional: He is oriented to person, place, and time. He appears well-developed and well-nourished. No distress. HENT:   Head: Normocephalic and atraumatic. Right Ear: External ear normal.   Left Ear: External ear normal.   Nose: Nose normal.   Mouth/Throat: Oropharynx is clear and moist.   Eyes: Pupils are equal, round, and reactive to light. Conjunctivae and EOM are normal. No scleral icterus. Neck: Normal range of motion. Neck supple. No JVD present. Carotid bruit is not present. No thyromegaly present. Cardiovascular: Normal rate, regular rhythm, S1 normal, S2 normal and normal heart sounds. No extrasystoles are present. PMI is not displaced. Exam reveals no gallop and no friction rub. No murmur heard. Pulmonary/Chest: Effort normal and breath sounds normal. No respiratory distress. He has no wheezes. He has no rhonchi. He has no rales. Abdominal: Soft. Bowel sounds are normal. There is no hepatosplenomegaly. There is no tenderness. There is no rebound, no guarding and no CVA tenderness. Genitourinary:   Genitourinary Comments: Exam deferred   Musculoskeletal: Normal range of motion. He exhibits no edema or tenderness.    Lymphadenopathy:

## 2019-07-02 RX ORDER — TRIAMTERENE AND HYDROCHLOROTHIAZIDE 37.5; 25 MG/1; MG/1
1 CAPSULE ORAL EVERY MORNING
Qty: 90 CAPSULE | Refills: 3 | Status: SHIPPED | OUTPATIENT
Start: 2019-07-02 | End: 2020-07-15

## 2019-07-02 NOTE — TELEPHONE ENCOUNTER
Ramiro De Leon with [facility] called, needs rx sent in for pt since she doesn't have a provider in until the end of this month.

## 2019-07-16 RX ORDER — VARDENAFIL HYDROCHLORIDE 20 MG/1
20 TABLET ORAL PRN
Qty: 6 TABLET | Refills: 3 | OUTPATIENT
Start: 2019-07-16

## 2019-08-14 RX ORDER — VARDENAFIL HYDROCHLORIDE 20 MG/1
20 TABLET ORAL PRN
Qty: 6 TABLET | Refills: 3 | OUTPATIENT
Start: 2019-08-14

## 2019-08-23 RX ORDER — VARDENAFIL HYDROCHLORIDE 20 MG/1
20 TABLET ORAL PRN
Qty: 6 TABLET | Refills: 3 | Status: SHIPPED | OUTPATIENT
Start: 2019-08-23 | End: 2022-07-26 | Stop reason: ALTCHOICE

## 2019-10-25 DIAGNOSIS — I10 ESSENTIAL HYPERTENSION: ICD-10-CM

## 2019-10-28 RX ORDER — PANTOPRAZOLE SODIUM 40 MG/1
TABLET, DELAYED RELEASE ORAL
Qty: 90 TABLET | Refills: 4 | Status: SHIPPED | OUTPATIENT
Start: 2019-10-28 | End: 2020-11-05 | Stop reason: SDUPTHER

## 2019-10-28 RX ORDER — NEBIVOLOL 10 MG/1
10 TABLET ORAL DAILY
Qty: 90 TABLET | Refills: 3 | Status: SHIPPED | OUTPATIENT
Start: 2019-10-28 | End: 2020-10-19

## 2020-02-24 RX ORDER — LISINOPRIL 20 MG/1
20 TABLET ORAL DAILY
Qty: 180 TABLET | Refills: 1 | Status: SHIPPED | OUTPATIENT
Start: 2020-02-24 | End: 2020-11-05 | Stop reason: SDUPTHER

## 2020-07-15 RX ORDER — TRIAMTERENE AND HYDROCHLOROTHIAZIDE 37.5; 25 MG/1; MG/1
1 CAPSULE ORAL EVERY MORNING
Qty: 90 CAPSULE | Refills: 0 | Status: SHIPPED | OUTPATIENT
Start: 2020-07-15 | End: 2020-11-05 | Stop reason: SDUPTHER

## 2020-10-19 RX ORDER — NEBIVOLOL 10 MG/1
10 TABLET ORAL DAILY
Qty: 30 TABLET | Refills: 0 | Status: SHIPPED | OUTPATIENT
Start: 2020-10-19 | End: 2020-11-05 | Stop reason: SDUPTHER

## 2020-10-19 NOTE — TELEPHONE ENCOUNTER
Received fax from pharmacy requesting refill on pts medication(s). Pt was last seen in office on 6/5/2019  and has a follow up scheduled for Visit date not found. Will send request to  Dr. Amy Apodaca  for patient.      Requested Prescriptions     Pending Prescriptions Disp Refills    BYSTOLIC 10 MG tablet [Pharmacy Med Name: BYSTOLIC TABS 88VN] 90 tablet 3     Sig: TAKE 1 TABLET DAILY

## 2020-11-02 DIAGNOSIS — I10 ESSENTIAL HYPERTENSION: ICD-10-CM

## 2020-11-02 DIAGNOSIS — Z00.00 GENERAL MEDICAL EXAM: ICD-10-CM

## 2020-11-02 DIAGNOSIS — E78.2 MIXED HYPERLIPIDEMIA: ICD-10-CM

## 2020-11-02 DIAGNOSIS — I25.10 CORONARY ARTERY DISEASE INVOLVING NATIVE HEART WITHOUT ANGINA PECTORIS, UNSPECIFIED VESSEL OR LESION TYPE: ICD-10-CM

## 2020-11-02 LAB
ALBUMIN SERPL-MCNC: 4.5 G/DL (ref 3.5–5.2)
ALP BLD-CCNC: 70 U/L (ref 40–130)
ALT SERPL-CCNC: 47 U/L (ref 5–41)
ANION GAP SERPL CALCULATED.3IONS-SCNC: 12 MMOL/L (ref 7–19)
AST SERPL-CCNC: 32 U/L (ref 5–40)
BASOPHILS ABSOLUTE: 0.1 K/UL (ref 0–0.2)
BASOPHILS RELATIVE PERCENT: 0.7 % (ref 0–1)
BILIRUB SERPL-MCNC: 0.4 MG/DL (ref 0.2–1.2)
BUN BLDV-MCNC: 16 MG/DL (ref 6–20)
CALCIUM SERPL-MCNC: 9.2 MG/DL (ref 8.6–10)
CHLORIDE BLD-SCNC: 106 MMOL/L (ref 98–111)
CHOLESTEROL, FASTING: 147 MG/DL (ref 160–199)
CO2: 23 MMOL/L (ref 22–29)
CREAT SERPL-MCNC: 1 MG/DL (ref 0.5–1.2)
CREATININE URINE: 259.9 MG/DL (ref 4.2–622)
EOSINOPHILS ABSOLUTE: 1.3 K/UL (ref 0–0.6)
EOSINOPHILS RELATIVE PERCENT: 14.1 % (ref 0–5)
FERRITIN: 64.1 NG/ML (ref 30–400)
FOLATE: >20 NG/ML (ref 4.5–32.2)
GFR AFRICAN AMERICAN: >59
GFR NON-AFRICAN AMERICAN: >60
GLUCOSE BLD-MCNC: 112 MG/DL (ref 74–109)
HCT VFR BLD CALC: 39.2 % (ref 42–52)
HDLC SERPL-MCNC: 22 MG/DL (ref 55–121)
HEMOGLOBIN: 12.6 G/DL (ref 14–18)
IMMATURE GRANULOCYTES #: 0.1 K/UL
IRON: 73 UG/DL (ref 59–158)
LDL CHOLESTEROL CALCULATED: ABNORMAL MG/DL
LDL CHOLESTEROL DIRECT: 48 MG/DL
LYMPHOCYTES ABSOLUTE: 2.6 K/UL (ref 1.1–4.5)
LYMPHOCYTES RELATIVE PERCENT: 29.4 % (ref 20–40)
MCH RBC QN AUTO: 29 PG (ref 27–31)
MCHC RBC AUTO-ENTMCNC: 32.1 G/DL (ref 33–37)
MCV RBC AUTO: 90.1 FL (ref 80–94)
MICROALBUMIN UR-MCNC: <1.2 MG/DL (ref 0–19)
MICROALBUMIN/CREAT UR-RTO: NORMAL MG/G
MONOCYTES ABSOLUTE: 0.5 K/UL (ref 0–0.9)
MONOCYTES RELATIVE PERCENT: 5.7 % (ref 0–10)
NEUTROPHILS ABSOLUTE: 4.4 K/UL (ref 1.5–7.5)
NEUTROPHILS RELATIVE PERCENT: 49.5 % (ref 50–65)
PDW BLD-RTO: 14.6 % (ref 11.5–14.5)
PLATELET # BLD: 280 K/UL (ref 130–400)
PMV BLD AUTO: 10.3 FL (ref 9.4–12.4)
POTASSIUM SERPL-SCNC: 4.4 MMOL/L (ref 3.5–5)
PROSTATE SPECIFIC ANTIGEN: 0.69 NG/ML (ref 0–4)
RBC # BLD: 4.35 M/UL (ref 4.7–6.1)
SODIUM BLD-SCNC: 141 MMOL/L (ref 136–145)
T4 FREE: 0.89 NG/DL (ref 0.93–1.7)
TOTAL PROTEIN: 7 G/DL (ref 6.6–8.7)
TRIGLYCERIDE, FASTING: 525 MG/DL (ref 0–149)
TSH SERPL DL<=0.05 MIU/L-ACNC: 1.9 UIU/ML (ref 0.27–4.2)
VITAMIN B-12: 490 PG/ML (ref 211–946)
WBC # BLD: 8.9 K/UL (ref 4.8–10.8)

## 2020-11-03 ENCOUNTER — TELEPHONE (OUTPATIENT)
Dept: PRIMARY CARE CLINIC | Age: 55
End: 2020-11-03

## 2020-11-03 NOTE — TELEPHONE ENCOUNTER
----- Message from [id number] [facility],[address], DO sent at 11/2/2020  6:19 PM CST -----  There is no significant protein excretion in urine. Iron stores are low normal.  Iron level is low normal as well  B12 and folate levels are normal.  Lipids show markedly elevated triglycerides. HDL is also low with a normal LDL. Recommend fenofibrate 160 mg daily. Recheck lipids and ALT in 4 to 6 weeks. Please make sure that this is an absolutely fasting specimen with nothing but water for 12 hours  Your metabolic profile is normal.  This includes kidney and liver functions as well as electrolytes.   Thyroid is at the lower margin of normal.  PSA is normal.  CBC shows a stable anemia otherwise unremarkable

## 2020-11-04 NOTE — TELEPHONE ENCOUNTER
Called patient, spoke with: Patient regarding the results of the patients most recent lasb. I advised Patient of Dr. Dara Rosario recommendations.    Patient did voice understanding    He has an appt tomorrow and wants to wait and discuss then

## 2020-11-05 ENCOUNTER — OFFICE VISIT (OUTPATIENT)
Dept: PRIMARY CARE CLINIC | Age: 55
End: 2020-11-05
Payer: OTHER GOVERNMENT

## 2020-11-05 VITALS
HEIGHT: 72 IN | BODY MASS INDEX: 33.18 KG/M2 | WEIGHT: 245 LBS | HEART RATE: 66 BPM | DIASTOLIC BLOOD PRESSURE: 82 MMHG | OXYGEN SATURATION: 97 % | TEMPERATURE: 98.7 F | SYSTOLIC BLOOD PRESSURE: 122 MMHG

## 2020-11-05 PROCEDURE — 99396 PREV VISIT EST AGE 40-64: CPT | Performed by: PEDIATRICS

## 2020-11-05 RX ORDER — PANTOPRAZOLE SODIUM 40 MG/1
TABLET, DELAYED RELEASE ORAL
Qty: 90 TABLET | Refills: 3 | Status: SHIPPED | OUTPATIENT
Start: 2020-11-05 | End: 2022-01-12

## 2020-11-05 RX ORDER — FENOFIBRATE 160 MG/1
160 TABLET ORAL DAILY
Qty: 90 TABLET | Refills: 3 | Status: SHIPPED | OUTPATIENT
Start: 2020-11-05 | End: 2021-11-16

## 2020-11-05 RX ORDER — TRIAMTERENE AND HYDROCHLOROTHIAZIDE 37.5; 25 MG/1; MG/1
1 CAPSULE ORAL EVERY MORNING
Qty: 90 CAPSULE | Refills: 3 | Status: SHIPPED | OUTPATIENT
Start: 2020-11-05 | End: 2021-02-09

## 2020-11-05 RX ORDER — LISINOPRIL 20 MG/1
20 TABLET ORAL DAILY
Qty: 90 TABLET | Refills: 3 | Status: SHIPPED | OUTPATIENT
Start: 2020-11-05 | End: 2022-07-26

## 2020-11-05 RX ORDER — NEBIVOLOL 10 MG/1
10 TABLET ORAL DAILY
Qty: 90 TABLET | Refills: 3 | Status: SHIPPED | OUTPATIENT
Start: 2020-11-05 | End: 2021-02-09

## 2020-11-05 ASSESSMENT — ENCOUNTER SYMPTOMS
COUGH: 0
TROUBLE SWALLOWING: 0
RHINORRHEA: 0
RESPIRATORY NEGATIVE: 1
GASTROINTESTINAL NEGATIVE: 1
VOMITING: 0
SHORTNESS OF BREATH: 0
WHEEZING: 0
EYES NEGATIVE: 1
BACK PAIN: 0
ABDOMINAL PAIN: 0
ALLERGIC/IMMUNOLOGIC NEGATIVE: 1
NAUSEA: 0
DIARRHEA: 0
SORE THROAT: 0
SINUS PRESSURE: 0
CHEST TIGHTNESS: 0
CONSTIPATION: 0
ROS SKIN COMMENTS: NO NEW OR SUSPICIOUS SKIN LESIONS

## 2020-11-05 ASSESSMENT — PATIENT HEALTH QUESTIONNAIRE - PHQ9
SUM OF ALL RESPONSES TO PHQ9 QUESTIONS 1 & 2: 0
2. FEELING DOWN, DEPRESSED OR HOPELESS: 0
SUM OF ALL RESPONSES TO PHQ QUESTIONS 1-9: 0
1. LITTLE INTEREST OR PLEASURE IN DOING THINGS: 0
SUM OF ALL RESPONSES TO PHQ QUESTIONS 1-9: 0
SUM OF ALL RESPONSES TO PHQ QUESTIONS 1-9: 0

## 2020-11-05 NOTE — PROGRESS NOTES
March 14, 2016. This was done at [facility] as well. GERD:  Medication:   Protonix 40 mg daily  Symptoms: ok as long as he takes medication      Anemia:  He is not on any medications for this either. Most recent hemoglobin and hematocrit were 12.6 and 39.2 respectively. Indices are normal.      Erectile dysfunction. Medication:   Levitra 20 mg as needed  Symptoms: this works well.       height is 6' (1.829 m) and weight is 245 lb (111.1 kg). His temporal temperature is 98.7 °F (37.1 °C). His blood pressure is 122/82 and his pulse is 66. His oxygen saturation is 97%. Body mass index is 33.23 kg/m².     I have reviewed the following with the Mr. Darby Zuleta   Lab Review  Orders Only on 11/02/2020   Component Date Value    TSH 11/02/2020 1.900     Cholesterol, Fasting 11/02/2020 147*    Triglyceride, Fasting 11/02/2020 525*    HDL 11/02/2020 22*    LDL Calculated 11/02/2020 -*    Sodium 11/02/2020 141     Potassium 11/02/2020 4.4     Chloride 11/02/2020 106     CO2 11/02/2020 23     Anion Gap 11/02/2020 12     Glucose 11/02/2020 112*    BUN 11/02/2020 16     CREATININE 11/02/2020 1.0     GFR Non- 11/02/2020 >60     GFR  11/02/2020 >59     Calcium 11/02/2020 9.2     Total Protein 11/02/2020 7.0     Alb 11/02/2020 4.5     Total Bilirubin 11/02/2020 0.4     Alkaline Phosphatase 11/02/2020 70     ALT 11/02/2020 47*    AST 11/02/2020 32     WBC 11/02/2020 8.9     RBC 11/02/2020 4.35*    Hemoglobin 11/02/2020 12.6*    Hematocrit 11/02/2020 39.2*    MCV 11/02/2020 90.1     MCH 11/02/2020 29.0     MCHC 11/02/2020 32.1*    RDW 11/02/2020 14.6*    Platelets 20/93/7218 280     MPV 11/02/2020 10.3     Neutrophils % 11/02/2020 49.5*    Lymphocytes % 11/02/2020 29.4     Monocytes % 11/02/2020 5.7     Eosinophils % 11/02/2020 14.1*    Basophils % 11/02/2020 0.7     Neutrophils Absolute 11/02/2020 4.4     Immature Granulocytes # 11/02/2020 0.1     Lymphocytes Absolute 11/02/2020 2.6     Monocytes Absolute 11/02/2020 0.50     Eosinophils Absolute 11/02/2020 1.30*    Basophils Absolute 11/02/2020 0.10     Ferritin 11/02/2020 64.1     Iron 11/02/2020 73     PSA 11/02/2020 0.69     Folate 11/02/2020 >20.0     Vitamin B-12 11/02/2020 490     Microalbumin, Random Uri* 11/02/2020 <1.20     Creatinine, Ur 11/02/2020 259.9     Microalbumin Creatinine * 11/02/2020 see below     T4 Free 11/02/2020 0.89*    LDL Direct 11/02/2020 48*     Copies of these are in the chart. Current Outpatient Medications   Medication Sig Dispense Refill    nebivolol (BYSTOLIC) 10 MG tablet Take 1 tablet by mouth daily (needs an appt before further refills) 90 tablet 3    triamterene-hydroCHLOROthiazide (DYAZIDE) 37.5-25 MG per capsule Take 1 capsule by mouth every morning (must be seen before further refills) 90 capsule 3    lisinopril (PRINIVIL;ZESTRIL) 20 MG tablet Take 1 tablet by mouth daily 90 tablet 3    pantoprazole (PROTONIX) 40 MG tablet TAKE 1 TABLET DAILY 90 tablet 3    fenofibrate (TRIGLIDE) 160 MG tablet Take 1 tablet by mouth daily 90 tablet 3    vardenafil (LEVITRA) 20 MG tablet Take 1 tablet by mouth as needed for Erectile Dysfunction 6 tablet 3    aspirin 81 MG tablet Take 81 mg by mouth daily. No current facility-administered medications for this visit. Allergies: Patient has no known allergies.      Past Medical History:   Diagnosis Date    ED (erectile dysfunction)     GERD (gastroesophageal reflux disease)     Heart attack ([facility] [address] 03/14/2016    Hyperlipidemia     Hypertension        Family History   Problem Relation Age of Onset    High Blood Pressure Mother     Diabetes Mother         borderline    Heart Attack Father     Kidney Disease Father     Heart Disease Father     Other Father         adbominal issues    Cancer Sister         Cervical     Heart Attack Paternal Grandmother     Heart Attack Paternal Grandfather Past Surgical History:   Procedure Laterality Date    AMPUTATION Left     BACK SURGERY      CARDIAC SURGERY      CORONARY ANGIOPLASTY WITH STENT PLACEMENT  2016    SHOULDER SURGERY Right 2018    Dr Rodriguez Safer History     Tobacco Use    Smoking status: Former Smoker     Packs/day: 1.00     Years: 25.00     Pack years: 25.00     Types: Cigarettes     Last attempt to quit: 2016     Years since quittin.8    Smokeless tobacco: Never Used   Substance Use Topics    Alcohol use: Yes     Comment: rare        Review of Systems   Constitutional: Negative. Negative for appetite change, chills, diaphoresis, fatigue, fever and unexpected weight change. HENT: Negative. Negative for dental problem (following with dentist regularly), ear pain, hearing loss, postnasal drip, rhinorrhea, sinus pressure, sore throat, tinnitus and trouble swallowing. Eyes: Negative. Negative for visual disturbance (following with regular eye exams). Respiratory: Negative. Negative for cough, chest tightness, shortness of breath and wheezing. No orthopnea   Cardiovascular: Negative. Negative for chest pain (or pressure), palpitations and leg swelling. Gastrointestinal: Negative. Negative for abdominal pain, constipation, diarrhea, nausea and vomiting. No melena or hematochezia   Endocrine: Negative. Negative for cold intolerance, heat intolerance, polydipsia and polyuria. Genitourinary: Negative. Negative for difficulty urinating, dysuria and enuresis. Musculoskeletal: Negative. Negative for arthralgias, back pain, joint swelling and myalgias. Skin: Negative. Negative for rash. No new or suspicious skin lesions   Allergic/Immunologic: Negative. Neurological: Negative. Negative for dizziness, tremors, syncope (or presyncope), weakness, light-headedness and headaches. Hematological: Negative. Negative for adenopathy. Does not bruise/bleed easily. Psychiatric/Behavioral: Negative. Physical Exam  Constitutional:       General: He is not in acute distress. Appearance: He is well-developed and normal weight. HENT:      Head: Normocephalic and atraumatic. Right Ear: Tympanic membrane, ear canal and external ear normal.      Left Ear: Tympanic membrane, ear canal and external ear normal.      Nose: Nose normal.      Mouth/Throat:      Mouth: Mucous membranes are moist.      Pharynx: Oropharynx is clear. Eyes:      General: No scleral icterus. Extraocular Movements: Extraocular movements intact. Conjunctiva/sclera: Conjunctivae normal.      Pupils: Pupils are equal, round, and reactive to light. Neck:      Musculoskeletal: Normal range of motion and neck supple. Thyroid: No thyromegaly. Vascular: No carotid bruit or JVD. Cardiovascular:      Rate and Rhythm: Normal rate and regular rhythm. No extrasystoles are present. Chest Wall: PMI is not displaced. Pulses: Normal pulses. Heart sounds: Normal heart sounds, S1 normal and S2 normal. No murmur. No friction rub. No gallop. Pulmonary:      Effort: Pulmonary effort is normal. No respiratory distress. Breath sounds: Normal breath sounds. No wheezing, rhonchi or rales. Abdominal:      General: Bowel sounds are normal.      Palpations: Abdomen is soft. Tenderness: There is no abdominal tenderness. There is no guarding or rebound. Genitourinary:     Comments: Exam deferred  Musculoskeletal: Normal range of motion. General: No tenderness. Lymphadenopathy:      Cervical: No cervical adenopathy. Skin:     General: Skin is warm and dry. Capillary Refill: Capillary refill takes less than 2 seconds. Findings: No rash. Neurological:      General: No focal deficit present. Mental Status: He is alert and oriented to person, place, and time. Sensory: No sensory deficit (no numbness or tingling).    Psychiatric:         Mood and Affect: Mood normal.         Behavior: Behavior normal.             ASSESSMENT      ICD-10-CM    1. Visit for preventive health examination  Z00.00    2. Essential hypertension  I10 nebivolol (BYSTOLIC) 10 MG tablet     triamterene-hydroCHLOROthiazide (DYAZIDE) 37.5-25 MG per capsule     lisinopril (PRINIVIL;ZESTRIL) 20 MG tablet   3. Mixed hyperlipidemia  E78.2 Lipid Panel     ALT     fenofibrate (TRIGLIDE) 160 MG tablet   4. Coronary artery disease involving native heart without angina pectoris, unspecified vessel or lesion type  I25.10    5. Gastroesophageal reflux disease, unspecified whether esophagitis present  K21.9 pantoprazole (PROTONIX) 40 MG tablet         PLAN    1. Essential hypertension  Excellently controlled  - nebivolol (BYSTOLIC) 10 MG tablet; Take 1 tablet by mouth daily (needs an appt before further refills)  Dispense: 90 tablet; Refill: 3  - triamterene-hydroCHLOROthiazide (DYAZIDE) 37.5-25 MG per capsule; Take 1 capsule by mouth every morning (must be seen before further refills)  Dispense: 90 capsule; Refill: 3  - lisinopril (PRINIVIL;ZESTRIL) 40 MG tablet; Take 1 tablet by mouth daily  Dispense: 90 tablet; Refill: 3    2. Visit for preventive health examination  Routine age-appropriate anticipatory guidance was provided. Annual wellness visit was performed in a separate note and issues were addressed as identified. 3. Mixed hyperlipidemia  Will start fenofibrate 160mg daily  - Lipid Panel; Future  - ALT; Future    4. Coronary artery disease involving native heart without angina pectoris, unspecified vessel or lesion type  No issue    5. Gastroesophageal reflux disease, unspecified whether esophagitis present  Continue the same. - pantoprazole (PROTONIX) 40 MG tablet; TAKE 1 TABLET DAILY  Dispense: 90 tablet;  Refill: 3      Orders Placed This Encounter   Procedures    Lipid Panel    ALT        Return in about 1 year (around 11/5/2021) for 30, Physical.     This was an in-house visit

## 2021-02-01 ENCOUNTER — HOSPITAL ENCOUNTER (EMERGENCY)
Facility: HOSPITAL | Age: 56
End: 2021-02-01
Attending: INTERNAL MEDICINE | Admitting: INTERNAL MEDICINE

## 2021-02-01 ENCOUNTER — HOSPITAL ENCOUNTER (INPATIENT)
Facility: HOSPITAL | Age: 56
LOS: 1 days | Discharge: HOME | End: 2021-02-02
Attending: EMERGENCY MEDICINE | Admitting: INTERNAL MEDICINE

## 2021-02-01 ENCOUNTER — APPOINTMENT (OUTPATIENT)
Dept: CARDIOLOGY | Facility: HOSPITAL | Age: 56
End: 2021-02-01

## 2021-02-01 DIAGNOSIS — I21.3 ST ELEVATION MYOCARDIAL INFARCTION (STEMI), UNSPECIFIED ARTERY (HCC): Primary | ICD-10-CM

## 2021-02-01 PROBLEM — I21.02 ST ELEVATION MYOCARDIAL INFARCTION INVOLVING LEFT ANTERIOR DESCENDING (LAD) CORONARY ARTERY (HCC): Status: ACTIVE | Noted: 2021-02-01

## 2021-02-01 LAB
ANION GAP SERPL CALCULATED.3IONS-SCNC: 10 MMOL/L (ref 5–15)
BH CV ECHO MEAS - AO MAX PG (FULL): 4 MMHG
BH CV ECHO MEAS - AO MAX PG: 8 MMHG
BH CV ECHO MEAS - AO MEAN PG (FULL): 3 MMHG
BH CV ECHO MEAS - AO MEAN PG: 5 MMHG
BH CV ECHO MEAS - AO ROOT AREA (BSA CORRECTED): 1.6
BH CV ECHO MEAS - AO ROOT AREA: 11.9 CM^2
BH CV ECHO MEAS - AO ROOT DIAM: 3.9 CM
BH CV ECHO MEAS - AO V2 MAX: 141 CM/SEC
BH CV ECHO MEAS - AO V2 MEAN: 103 CM/SEC
BH CV ECHO MEAS - AO V2 VTI: 29.3 CM
BH CV ECHO MEAS - AVA(I,A): 2 CM^2
BH CV ECHO MEAS - AVA(I,D): 2 CM^2
BH CV ECHO MEAS - AVA(V,A): 2.2 CM^2
BH CV ECHO MEAS - AVA(V,D): 2.2 CM^2
BH CV ECHO MEAS - BSA(HAYCOCK): 2.5 M^2
BH CV ECHO MEAS - BSA: 2.4 M^2
BH CV ECHO MEAS - BZI_BMI: 32.9 KILOGRAMS/M^2
BH CV ECHO MEAS - BZI_METRIC_HEIGHT: 188 CM
BH CV ECHO MEAS - BZI_METRIC_WEIGHT: 116.1 KG
BH CV ECHO MEAS - EDV(CUBED): 150.6 ML
BH CV ECHO MEAS - EDV(MOD-SP4): 148 ML
BH CV ECHO MEAS - EDV(TEICH): 136.5 ML
BH CV ECHO MEAS - EF(CUBED): 53.2 %
BH CV ECHO MEAS - EF(MOD-SP4): 52.3 %
BH CV ECHO MEAS - EF(TEICH): 44.7 %
BH CV ECHO MEAS - ESV(CUBED): 70.4 ML
BH CV ECHO MEAS - ESV(MOD-SP4): 70.6 ML
BH CV ECHO MEAS - ESV(TEICH): 75.5 ML
BH CV ECHO MEAS - FS: 22.4 %
BH CV ECHO MEAS - IVS/LVPW: 1.2
BH CV ECHO MEAS - IVSD: 1.3 CM
BH CV ECHO MEAS - LA DIMENSION: 4.5 CM
BH CV ECHO MEAS - LA/AO: 1.2
BH CV ECHO MEAS - LAT PEAK E' VEL: 5.2 CM/SEC
BH CV ECHO MEAS - LV DIASTOLIC VOL/BSA (35-75): 61.3 ML/M^2
BH CV ECHO MEAS - LV MASS(C)D: 262.6 GRAMS
BH CV ECHO MEAS - LV MASS(C)DI: 108.8 GRAMS/M^2
BH CV ECHO MEAS - LV MAX PG: 4 MMHG
BH CV ECHO MEAS - LV MEAN PG: 2 MMHG
BH CV ECHO MEAS - LV SYSTOLIC VOL/BSA (12-30): 29.3 ML/M^2
BH CV ECHO MEAS - LV V1 MAX: 99.8 CM/SEC
BH CV ECHO MEAS - LV V1 MEAN: 63.1 CM/SEC
BH CV ECHO MEAS - LV V1 VTI: 19.1 CM
BH CV ECHO MEAS - LVIDD: 5.3 CM
BH CV ECHO MEAS - LVIDS: 4.1 CM
BH CV ECHO MEAS - LVLD AP4: 9.5 CM
BH CV ECHO MEAS - LVLS AP4: 8.4 CM
BH CV ECHO MEAS - LVOT AREA (M): 3.1 CM^2
BH CV ECHO MEAS - LVOT AREA: 3.1 CM^2
BH CV ECHO MEAS - LVOT DIAM: 2 CM
BH CV ECHO MEAS - LVPWD: 1.1 CM
BH CV ECHO MEAS - MED PEAK E' VEL: 4.5 CM/SEC
BH CV ECHO MEAS - MV A MAX VEL: 76.2 CM/SEC
BH CV ECHO MEAS - MV DEC SLOPE: 252.5 CM/SEC^2
BH CV ECHO MEAS - MV DEC TIME: 0.21 SEC
BH CV ECHO MEAS - MV E MAX VEL: 40.7 CM/SEC
BH CV ECHO MEAS - MV E/A: 0.53
BH CV ECHO MEAS - MV P1/2T MAX VEL: 73.8 CM/SEC
BH CV ECHO MEAS - MV P1/2T: 85.6 MSEC
BH CV ECHO MEAS - MVA P1/2T LCG: 3 CM^2
BH CV ECHO MEAS - MVA(P1/2T): 2.6 CM^2
BH CV ECHO MEAS - PA MAX PG: 5.7 MMHG
BH CV ECHO MEAS - PA V2 MAX: 119 CM/SEC
BH CV ECHO MEAS - RAP SYSTOLE: 5 MMHG
BH CV ECHO MEAS - RVSP: 22 MMHG
BH CV ECHO MEAS - SI(AO): 145 ML/M^2
BH CV ECHO MEAS - SI(CUBED): 33.2 ML/M^2
BH CV ECHO MEAS - SI(LVOT): 24.9 ML/M^2
BH CV ECHO MEAS - SI(MOD-SP4): 32.1 ML/M^2
BH CV ECHO MEAS - SI(TEICH): 25.3 ML/M^2
BH CV ECHO MEAS - SV(AO): 350 ML
BH CV ECHO MEAS - SV(CUBED): 80.1 ML
BH CV ECHO MEAS - SV(LVOT): 60 ML
BH CV ECHO MEAS - SV(MOD-SP4): 77.4 ML
BH CV ECHO MEAS - SV(TEICH): 61 ML
BH CV ECHO MEAS - TR MAX VEL: 206 CM/SEC
BH CV ECHO MEASUREMENTS AVERAGE E/E' RATIO: 8.39
BUN SERPL-MCNC: 26 MG/DL (ref 6–20)
BUN/CREAT SERPL: 18.4 (ref 7–25)
CALCIUM SPEC-SCNC: 9.4 MG/DL (ref 8.6–10.5)
CHLORIDE SERPL-SCNC: 104 MMOL/L (ref 98–107)
CHOLEST SERPL-MCNC: 129 MG/DL (ref 0–200)
CO2 SERPL-SCNC: 21 MMOL/L (ref 22–29)
CREAT SERPL-MCNC: 1.41 MG/DL (ref 0.76–1.27)
DEPRECATED RDW RBC AUTO: 41.1 FL (ref 37–54)
ERYTHROCYTE [DISTWIDTH] IN BLOOD BY AUTOMATED COUNT: 13.3 % (ref 12.3–15.4)
GFR SERPL CREATININE-BSD FRML MDRD: 52 ML/MIN/1.73
GLUCOSE SERPL-MCNC: 148 MG/DL (ref 65–99)
HBA1C MFR BLD: 6 % (ref 4.8–5.6)
HCT VFR BLD AUTO: 38.9 % (ref 37.5–51)
HDLC SERPL-MCNC: 19 MG/DL (ref 40–60)
HGB BLD-MCNC: 12.7 G/DL (ref 13–17.7)
LDLC SERPL CALC-MCNC: 57 MG/DL (ref 0–100)
LDLC/HDLC SERPL: 2.22 {RATIO}
LEFT ATRIUM VOLUME INDEX: 41 ML/M2
LV EF 2D ECHO EST: 50 %
MAXIMAL PREDICTED HEART RATE: 163 BPM
MCH RBC QN AUTO: 27.5 PG (ref 26.6–33)
MCHC RBC AUTO-ENTMCNC: 32.6 G/DL (ref 31.5–35.7)
MCV RBC AUTO: 84.4 FL (ref 79–97)
PLATELET # BLD AUTO: 320 10*3/MM3 (ref 140–450)
PMV BLD AUTO: 9.9 FL (ref 6–12)
POTASSIUM SERPL-SCNC: 5 MMOL/L (ref 3.5–5.2)
RBC # BLD AUTO: 4.61 10*6/MM3 (ref 4.14–5.8)
SODIUM SERPL-SCNC: 135 MMOL/L (ref 136–145)
STRESS TARGET HR: 139 BPM
TRIGL SERPL-MCNC: 339 MG/DL (ref 0–150)
TROPONIN T SERPL-MCNC: 2.59 NG/ML (ref 0–0.03)
VLDLC SERPL-MCNC: 53 MG/DL (ref 5–40)
WBC # BLD AUTO: 7.22 10*3/MM3 (ref 3.4–10.8)

## 2021-02-01 PROCEDURE — 36415 COLL VENOUS BLD VENIPUNCTURE: CPT | Performed by: EMERGENCY MEDICINE

## 2021-02-01 PROCEDURE — C1725 CATH, TRANSLUMIN NON-LASER: HCPCS | Performed by: INTERNAL MEDICINE

## 2021-02-01 PROCEDURE — 25010000002 HEPARIN (PORCINE) 2000-0.9 UNIT/L-% SOLUTION: Performed by: INTERNAL MEDICINE

## 2021-02-01 PROCEDURE — 027035Z DILATION OF CORONARY ARTERY, ONE ARTERY WITH TWO DRUG-ELUTING INTRALUMINAL DEVICES, PERCUTANEOUS APPROACH: ICD-10-PCS | Performed by: INTERNAL MEDICINE

## 2021-02-01 PROCEDURE — 93005 ELECTROCARDIOGRAM TRACING: CPT | Performed by: EMERGENCY MEDICINE

## 2021-02-01 PROCEDURE — 99283 EMERGENCY DEPT VISIT LOW MDM: CPT

## 2021-02-01 PROCEDURE — 25010000002 PERFLUTREN 6.52 MG/ML SUSPENSION: Performed by: INTERNAL MEDICINE

## 2021-02-01 PROCEDURE — B2151ZZ FLUOROSCOPY OF LEFT HEART USING LOW OSMOLAR CONTRAST: ICD-10-PCS | Performed by: INTERNAL MEDICINE

## 2021-02-01 PROCEDURE — C1760 CLOSURE DEV, VASC: HCPCS | Performed by: INTERNAL MEDICINE

## 2021-02-01 PROCEDURE — C1894 INTRO/SHEATH, NON-LASER: HCPCS | Performed by: INTERNAL MEDICINE

## 2021-02-01 PROCEDURE — C1769 GUIDE WIRE: HCPCS | Performed by: INTERNAL MEDICINE

## 2021-02-01 PROCEDURE — 80061 LIPID PANEL: CPT | Performed by: INTERNAL MEDICINE

## 2021-02-01 PROCEDURE — 83036 HEMOGLOBIN GLYCOSYLATED A1C: CPT | Performed by: INTERNAL MEDICINE

## 2021-02-01 PROCEDURE — 99152 MOD SED SAME PHYS/QHP 5/>YRS: CPT | Performed by: INTERNAL MEDICINE

## 2021-02-01 PROCEDURE — 25010000002 BIVALIRUDIN TRIFLUOROACETATE 250 MG RECONSTITUTED SOLUTION: Performed by: INTERNAL MEDICINE

## 2021-02-01 PROCEDURE — 93005 ELECTROCARDIOGRAM TRACING: CPT | Performed by: INTERNAL MEDICINE

## 2021-02-01 PROCEDURE — 25010000002 MIDAZOLAM HCL (PF) 5 MG/5ML SOLUTION: Performed by: INTERNAL MEDICINE

## 2021-02-01 PROCEDURE — 25010000002 HEPARIN (PORCINE) 1000-0.9 UT/500ML-% SOLUTION: Performed by: INTERNAL MEDICINE

## 2021-02-01 PROCEDURE — 84484 ASSAY OF TROPONIN QUANT: CPT | Performed by: EMERGENCY MEDICINE

## 2021-02-01 PROCEDURE — 4A023N7 MEASUREMENT OF CARDIAC SAMPLING AND PRESSURE, LEFT HEART, PERCUTANEOUS APPROACH: ICD-10-PCS | Performed by: INTERNAL MEDICINE

## 2021-02-01 PROCEDURE — 93010 ELECTROCARDIOGRAM REPORT: CPT | Performed by: INTERNAL MEDICINE

## 2021-02-01 PROCEDURE — 25010000002 DIPHENHYDRAMINE PER 50 MG: Performed by: INTERNAL MEDICINE

## 2021-02-01 PROCEDURE — 93454 CORONARY ARTERY ANGIO S&I: CPT | Performed by: INTERNAL MEDICINE

## 2021-02-01 PROCEDURE — 99153 MOD SED SAME PHYS/QHP EA: CPT | Performed by: INTERNAL MEDICINE

## 2021-02-01 PROCEDURE — 0 IOPAMIDOL PER 1 ML: Performed by: INTERNAL MEDICINE

## 2021-02-01 PROCEDURE — 99223 1ST HOSP IP/OBS HIGH 75: CPT | Performed by: INTERNAL MEDICINE

## 2021-02-01 PROCEDURE — 80048 BASIC METABOLIC PNL TOTAL CA: CPT | Performed by: INTERNAL MEDICINE

## 2021-02-01 PROCEDURE — B2111ZZ FLUOROSCOPY OF MULTIPLE CORONARY ARTERIES USING LOW OSMOLAR CONTRAST: ICD-10-PCS | Performed by: INTERNAL MEDICINE

## 2021-02-01 PROCEDURE — C1874 STENT, COATED/COV W/DEL SYS: HCPCS | Performed by: INTERNAL MEDICINE

## 2021-02-01 PROCEDURE — 85027 COMPLETE CBC AUTOMATED: CPT | Performed by: INTERNAL MEDICINE

## 2021-02-01 PROCEDURE — 93306 TTE W/DOPPLER COMPLETE: CPT | Performed by: INTERNAL MEDICINE

## 2021-02-01 PROCEDURE — C1887 CATHETER, GUIDING: HCPCS | Performed by: INTERNAL MEDICINE

## 2021-02-01 PROCEDURE — 25010000002 FENTANYL CITRATE (PF) 100 MCG/2ML SOLUTION: Performed by: INTERNAL MEDICINE

## 2021-02-01 PROCEDURE — 92941 PRQ TRLML REVSC TOT OCCL AMI: CPT | Performed by: INTERNAL MEDICINE

## 2021-02-01 PROCEDURE — 93306 TTE W/DOPPLER COMPLETE: CPT

## 2021-02-01 PROCEDURE — C9606 PERC D-E COR REVASC W AMI S: HCPCS | Performed by: INTERNAL MEDICINE

## 2021-02-01 DEVICE — XIENCE SIERRA™ EVEROLIMUS ELUTING CORONARY STENT SYSTEM 3.00 MM X 23 MM / RAPID-EXCHANGE
Type: IMPLANTABLE DEVICE | Status: FUNCTIONAL
Brand: XIENCE SIERRA™

## 2021-02-01 DEVICE — XIENCE SIERRA™ EVEROLIMUS ELUTING CORONARY STENT SYSTEM 2.75 MM X 23 MM / RAPID-EXCHANGE
Type: IMPLANTABLE DEVICE | Status: FUNCTIONAL
Brand: XIENCE SIERRA™

## 2021-02-01 RX ORDER — LISINOPRIL 20 MG/1
20 TABLET ORAL DAILY
COMMUNITY
End: 2021-02-02 | Stop reason: HOSPADM

## 2021-02-01 RX ORDER — ASPIRIN 81 MG/1
81 TABLET, CHEWABLE ORAL DAILY
COMMUNITY

## 2021-02-01 RX ORDER — LABETALOL HYDROCHLORIDE 5 MG/ML
10 INJECTION, SOLUTION INTRAVENOUS EVERY 6 HOURS PRN
Status: DISCONTINUED | OUTPATIENT
Start: 2021-02-01 | End: 2021-02-02 | Stop reason: HOSPADM

## 2021-02-01 RX ORDER — SODIUM CHLORIDE 0.9 % (FLUSH) 0.9 %
10 SYRINGE (ML) INJECTION EVERY 12 HOURS SCHEDULED
Status: DISCONTINUED | OUTPATIENT
Start: 2021-02-01 | End: 2021-02-02 | Stop reason: HOSPADM

## 2021-02-01 RX ORDER — LIDOCAINE HYDROCHLORIDE 20 MG/ML
INJECTION, SOLUTION INFILTRATION; PERINEURAL AS NEEDED
Status: DISCONTINUED | OUTPATIENT
Start: 2021-02-01 | End: 2021-02-01 | Stop reason: HOSPADM

## 2021-02-01 RX ORDER — HEPARIN SODIUM 200 [USP'U]/100ML
INJECTION, SOLUTION INTRAVENOUS AS NEEDED
Status: DISCONTINUED | OUTPATIENT
Start: 2021-02-01 | End: 2021-02-01 | Stop reason: HOSPADM

## 2021-02-01 RX ORDER — ASPIRIN 81 MG/1
324 TABLET, CHEWABLE ORAL ONCE
Status: COMPLETED | OUTPATIENT
Start: 2021-02-01 | End: 2021-02-01

## 2021-02-01 RX ORDER — NITROGLYCERIN 0.4 MG/1
0.4 TABLET SUBLINGUAL
Status: DISCONTINUED | OUTPATIENT
Start: 2021-02-01 | End: 2021-02-02 | Stop reason: HOSPADM

## 2021-02-01 RX ORDER — PANTOPRAZOLE SODIUM 40 MG/1
40 TABLET, DELAYED RELEASE ORAL DAILY
COMMUNITY

## 2021-02-01 RX ORDER — ONDANSETRON 2 MG/ML
4 INJECTION INTRAMUSCULAR; INTRAVENOUS EVERY 6 HOURS PRN
Status: DISCONTINUED | OUTPATIENT
Start: 2021-02-01 | End: 2021-02-02 | Stop reason: HOSPADM

## 2021-02-01 RX ORDER — ONDANSETRON 4 MG/1
4 TABLET, FILM COATED ORAL EVERY 6 HOURS PRN
Status: DISCONTINUED | OUTPATIENT
Start: 2021-02-01 | End: 2021-02-02 | Stop reason: HOSPADM

## 2021-02-01 RX ORDER — TRIAMTERENE AND HYDROCHLOROTHIAZIDE 37.5; 25 MG/1; MG/1
1 CAPSULE ORAL EVERY MORNING
COMMUNITY
End: 2021-02-02 | Stop reason: HOSPADM

## 2021-02-01 RX ORDER — ACETAMINOPHEN 325 MG/1
650 TABLET ORAL EVERY 4 HOURS PRN
Status: DISCONTINUED | OUTPATIENT
Start: 2021-02-01 | End: 2021-02-02 | Stop reason: HOSPADM

## 2021-02-01 RX ORDER — NEBIVOLOL 10 MG/1
10 TABLET ORAL DAILY
COMMUNITY
End: 2021-02-02 | Stop reason: HOSPADM

## 2021-02-01 RX ORDER — DIPHENHYDRAMINE HYDROCHLORIDE 50 MG/ML
INJECTION INTRAMUSCULAR; INTRAVENOUS AS NEEDED
Status: DISCONTINUED | OUTPATIENT
Start: 2021-02-01 | End: 2021-02-01 | Stop reason: HOSPADM

## 2021-02-01 RX ORDER — ASPIRIN 81 MG/1
81 TABLET ORAL DAILY
Status: DISCONTINUED | OUTPATIENT
Start: 2021-02-02 | End: 2021-02-02 | Stop reason: HOSPADM

## 2021-02-01 RX ORDER — MIDAZOLAM HYDROCHLORIDE 1 MG/ML
INJECTION, SOLUTION INTRAMUSCULAR; INTRAVENOUS AS NEEDED
Status: DISCONTINUED | OUTPATIENT
Start: 2021-02-01 | End: 2021-02-01 | Stop reason: HOSPADM

## 2021-02-01 RX ORDER — SODIUM CHLORIDE 9 MG/ML
100 INJECTION, SOLUTION INTRAVENOUS CONTINUOUS
Status: DISPENSED | OUTPATIENT
Start: 2021-02-01 | End: 2021-02-01

## 2021-02-01 RX ORDER — ATORVASTATIN CALCIUM 40 MG/1
40 TABLET, FILM COATED ORAL NIGHTLY
Status: DISCONTINUED | OUTPATIENT
Start: 2021-02-01 | End: 2021-02-02 | Stop reason: HOSPADM

## 2021-02-01 RX ORDER — SODIUM CHLORIDE 0.9 % (FLUSH) 0.9 %
10 SYRINGE (ML) INJECTION AS NEEDED
Status: DISCONTINUED | OUTPATIENT
Start: 2021-02-01 | End: 2021-02-02 | Stop reason: HOSPADM

## 2021-02-01 RX ORDER — FENTANYL CITRATE 50 UG/ML
INJECTION, SOLUTION INTRAMUSCULAR; INTRAVENOUS AS NEEDED
Status: DISCONTINUED | OUTPATIENT
Start: 2021-02-01 | End: 2021-02-01 | Stop reason: HOSPADM

## 2021-02-01 RX ORDER — FENOFIBRATE 160 MG/1
160 TABLET ORAL DAILY
COMMUNITY

## 2021-02-01 RX ADMIN — METOPROLOL TARTRATE 25 MG: 25 TABLET, FILM COATED ORAL at 08:49

## 2021-02-01 RX ADMIN — ASPIRIN 324 MG: 81 TABLET, CHEWABLE ORAL at 04:47

## 2021-02-01 RX ADMIN — SODIUM CHLORIDE 100 ML/HR: 9 INJECTION, SOLUTION INTRAVENOUS at 04:47

## 2021-02-01 RX ADMIN — SODIUM CHLORIDE, PRESERVATIVE FREE 10 ML: 5 INJECTION INTRAVENOUS at 20:09

## 2021-02-01 RX ADMIN — SODIUM CHLORIDE, PRESERVATIVE FREE 10 ML: 5 INJECTION INTRAVENOUS at 08:49

## 2021-02-01 RX ADMIN — METOPROLOL TARTRATE 25 MG: 25 TABLET, FILM COATED ORAL at 20:08

## 2021-02-01 RX ADMIN — ATORVASTATIN CALCIUM 40 MG: 40 TABLET, FILM COATED ORAL at 20:08

## 2021-02-01 RX ADMIN — TICAGRELOR 90 MG: 90 TABLET ORAL at 16:47

## 2021-02-01 RX ADMIN — PERFLUTREN 8.48 MG: 6.52 INJECTION, SUSPENSION INTRAVENOUS at 10:18

## 2021-02-01 NOTE — PROGRESS NOTES
Discharge Planning Assessment  [facility]     Patient Name: Eduardo Altman  MRN: [id number]  Today's Date: 2/1/2021    Admit Date: 2/1/2021    Discharge Needs Assessment     Row Name 02/01/21 1042       Living Environment    Current Living Arrangements  home/apartment/condo    Primary Care Provided by  self    Provides Primary Care For  no one    Quality of Family Relationships  unable to assess    Able to Return to Prior Arrangements  yes       Resource/Environmental Concerns    Resource/Environmental Concerns  none       Transition Planning    Patient/Family Anticipates Transition to  home    Patient/Family Anticipated Services at Transition  none    Transportation Anticipated  family or friend will provide       Discharge Needs Assessment    Readmission Within the Last 30 Days  no previous admission in last 30 days    Equipment Currently Used at Home  none    Concerns to be Addressed  no discharge needs identified    Anticipated Changes Related to Illness  none    Equipment Needed After Discharge  none    Current Discharge Risk  chronically ill    Discharge Coordination/Progress  Patient admitted from home.  Patient is independent, has a PCP and rX coverage.  Patient will return home upon discharge.        Discharge Plan    No documentation.       Continued Care and Services - Admitted Since 2/1/2021    Coordination has not been started for this encounter.         Demographic Summary    No documentation.       Functional Status    No documentation.       Psychosocial    No documentation.       Abuse/Neglect    No documentation.       Legal    No documentation.       Substance Abuse    No documentation.       Patient Forms    No documentation.           EMMA Blanca

## 2021-02-01 NOTE — ED PROVIDER NOTES
Subjective     54 y/o male arrives for since improved but continued anterior pressure like chest pain without radiation. He noted this pain around 945 pm and presented to OSH. His first EKG was abnormal but his first troponin was normal. A subsequent EKG was even more abnormal and his second CE was 0.2 it was then we were called for transfer. Upon arrival here his jamarcus is a 3/10. He notes prior CAD in 2016 with PTCA of LAD at that time. He arrives in NAD and immediately taken to cath lab.           Review of Systems   All other systems reviewed and are negative.      Past Medical History:   Diagnosis Date   • Coronary artery disease    • Hyperlipidemia    • Hypertension    • Myocardial infarction (CMS/HCC)        No Known Allergies    Past Surgical History:   Procedure Laterality Date   • BACK SURGERY     • CORONARY STENT PLACEMENT     • FINGER SURGERY     • TONSILLECTOMY         Family History   Problem Relation Age of Onset   • Coronary artery disease Father        Social History     Socioeconomic History   • Marital status: Single     Spouse name: Not on file   • Number of children: Not on file   • Years of education: Not on file   • Highest education level: Not on file   Tobacco Use   • Smoking status: Former Smoker     Types: Cigarettes     Quit date:      Years since quittin.0   Substance and Sexual Activity   • Alcohol use: Not Currently   • Drug use: Not Currently           Objective   Physical Exam  Vitals signs and nursing note reviewed.   Constitutional:       Appearance: He is well-developed.   HENT:      Head: Normocephalic and atraumatic.   Eyes:      Pupils: Pupils are equal, round, and reactive to light.   Neck:      Musculoskeletal: Normal range of motion and neck supple.   Cardiovascular:      Rate and Rhythm: Normal rate and regular rhythm.      Heart sounds: Normal heart sounds.   Pulmonary:      Effort: Pulmonary effort is normal.      Breath sounds: Normal breath sounds. No decreased  breath sounds, wheezing, rhonchi or rales.   Musculoskeletal: Normal range of motion.   Skin:     General: Skin is warm and dry.      Capillary Refill: Capillary refill takes less than 2 seconds.   Neurological:      General: No focal deficit present.      Mental Status: He is alert and oriented to person, place, and time.   Psychiatric:         Mood and Affect: Mood normal.         Behavior: Behavior normal.         ECG 12 Lead      Date/Time: 2/1/2021 2:48 AM  Performed by: Castro Cano MD  Authorized by: Castro Cano MD   Interpreted by physician  Rhythm: sinus rhythm  Rate: normal  BPM: 76  ST Elevation: V2, V3, V4, V5 and V6                   ED Course  ED Course as of Feb 01 0300 Mon Feb 01, 2021   0147 EKG sent from OSH does appear to show a STEMI we will activate cath lab.     [JH]      ED Course User Index  [JH] Castro Cano MD      PROCEDURES PERFORMED:   1.  Emergent coronary angiography.   2.  Left ventriculography.   3.  PTCA with stent placement to the left anterior descending.      METHODS: The right inguinal region was prepared and draped in sterile fashion  and anesthetized locally with Xylocaine. Access was obtained with a 6-Hungarian  sheath. Coronary angiography was performed with a 6-Hungarian XB LAD 3.5 guiding  catheter, which provided limited guiding back up. Were I restarting this case,  I would use a 6-Hungarian XB 4.0 guiding catheter. A polymer-coated 182 cm 0.014  wire was then advanced across the total occlusion of the LAD proper. The lesion  was dilated with a 2.0 x 20 dilating balloon. Followup angiography revealed  high-grade obstruction and probable dissection in the LAD just after the origin  of the 1st diagonal branch. The lesion was dilated and secured with a 2.75 x 23  everolimus coated stent and dilated to 2.8 mm. During the procedure, the  patient received a bolus of infusion of intravenous eptifibatide. Intravenous  enoxaparin was also administered.  Intracoronary eptifibatide was infused, as  was intracoronary nitroglycerin. Hemostasis was obtained with a percutaneous  suture.      RESULTS:      CORONARY ANGIOGRAPHY:   1.  This was a right dominant system.   2.  The left main coronary artery bifurcated into the LAD and left circumflex  coronary artery. The LAD was a relatively long vessel with normal caliber. It  gave rise to 1 moderate-sized diagonal branch, as well as 2 somewhat smaller  diagonal branches more distally. The LAD proper was 100% totally occluded after  the first diagonal branch. The first diagonal branch itself contained a 77%  stenosis.   3.  The left circumflex coronary artery gave rise to 1 large branching obtuse  marginal vessel and a smaller 2nd obtuse marginal vessel. In the 1st obtuse  marginal vessel, a branch was noted to contain a 50% stenosis, at this point  left to be treated medically.   4.  The right coronary artery was a dominant vessel of normal caliber. It gave  rise to a right atrial branch, as well as an acute marginal branch prior to  bifurcating into a normal caliber PDA and a normal posterolateral artery. There  was minimal plaque but no high-grade obstruction of the right coronary artery  distribution.      HEMODYNAMICS:   1.  LV pressure 102/12, no gradient across the aortic valve.   2.  Left ventriculography: Ejection fraction of 40% with anterolateral and  anteroapical hypokinesis.   3.  Post PTCA angiography, the 100% totally occluded LAD was reduced to 0%  obstruction, though thrombus was identified just upstream of the recently  placed stent.      IMPRESSION:   1.  Acute myocardial infarction with stenosis of the left anterior descending  as the infarct-related stenosis.   2.  Significant residual coronary artery disease involving the diagonal branch  of the left anterior descending.   3.  Significant residual thrombus identified just proximal to the previously  placed stents.   4.  Significant coronary artery  disease involving the diagonal branch of the  left anterior descending.   5.  Moderate left ventricular dysfunction. Ejection fraction of 40% with  anteroapical and anterolateral hypokinesis.   6.  No significant aortic valve stenosis or mitral valve regurgitation.                                          __________________________                                John E. Broadbent, M.D.     MICHELLE/[id number]  D:  03/14/2016 17:01:40 (Eastern Time)  T:  03/14/2016 18:04:38 (Eastern Time)  Voice ID:  [id number]/DocumentID:  [id number]  0  DO NOT TEXT EDIT THIS LINE :WPCC:[id number]:  Authenticated by JOHN E BROADBENT, M.D. On 03/15/2016 04:11:05 PM                                      [facility]    Final diagnoses:   ST elevation myocardial infarction (STEMI), unspecified artery (CMS/HCC)            Castro Cano MD  02/01/21 0300       Castro Cano MD  02/01/21 0301       Castro Cano MD  02/01/21 0333

## 2021-02-01 NOTE — H&P
Chief Complaint   Patient presents with   • Chest Pain     HPI: This is a 55-year-old male with a history of coronary artery disease, status post previous PCI in 2016 to the LAD, also with hypertension hyperlipidemia, presenting on transfer from outside hospital with an acute STEMI.  The patient says that his pain started around 945 while he was at rest, located substernal, severe, waxing and waning but not completely relieved until going to an outside hospital, nonradiating, no associated signs or symptoms.    The patient presented to an outside hospital at approximately 1015.  Labs were obtained and initial EKG was performed.  After nitroglycerin was given, the patient reportedly was chest pain-free.  Ultimately, the ECG was transmitted to our staff where a code STEMI was called.  Patient is now here with chest pain that is rated at a 4/10 in severity.    Chest Pain   This is a new problem. The current episode started today. The onset quality is sudden. The problem occurs constantly. The problem has been waxing and waning. The pain is present in the substernal region. The pain is severe. The quality of the pain is described as pressure. The pain does not radiate. Associated symptoms include back pain. Pertinent negatives include no abdominal pain, cough, dizziness, fever, headaches, hemoptysis, irregular heartbeat, nausea, numbness, orthopnea, palpitations, PND, shortness of breath, syncope or vomiting. The pain is aggravated by nothing. He has tried nitroglycerin for the symptoms. The treatment provided significant relief. Risk factors include male gender.   His past medical history is significant for CAD, hyperlipidemia and hypertension.     Past Medical History:   Diagnosis Date   • Coronary artery disease    • Hyperlipidemia    • Hypertension    • Myocardial infarction (CMS/HCC)      Past Surgical History:   Procedure Laterality Date   • BACK SURGERY     • CORONARY STENT PLACEMENT     • FINGER SURGERY     •  TONSILLECTOMY       Home Medications:  -Aspirin 81 mg daily  -Bystolic 10 mg daily  -Dyazide 37.5/25 mg daily  -Fenofibrate 160 mg daily  -Lisinopril 20 mg daily  -Protonix 40 mg daily    No Known Allergies    Social History     Tobacco Use   • Smoking status: Former Smoker     Types: Cigarettes     Quit date:      Years since quittin.0   Substance Use Topics   • Alcohol use: Not Currently     Family History   Problem Relation Age of Onset   • Coronary artery disease Father      Review of Systems   Constitution: Negative for chills, fever, night sweats and weight loss.   HENT: Negative for congestion and hearing loss.    Eyes: Negative for blurred vision and pain.   Cardiovascular: Positive for chest pain. Negative for dyspnea on exertion, irregular heartbeat, leg swelling, orthopnea, palpitations, paroxysmal nocturnal dyspnea and syncope.   Respiratory: Negative for cough, hemoptysis, shortness of breath and wheezing.    Endocrine: Negative for cold intolerance, heat intolerance, polydipsia and polyuria.   Hematologic/Lymphatic: Negative for adenopathy and bleeding problem. Does not bruise/bleed easily.   Skin: Negative for color change, poor wound healing and rash.   Musculoskeletal: Positive for arthritis and back pain. Negative for myalgias and neck pain.   Gastrointestinal: Positive for heartburn. Negative for abdominal pain, change in bowel habit, nausea and vomiting.   Genitourinary: Negative for dysuria and frequency.   Neurological: Negative for dizziness, focal weakness, headaches, light-headedness, loss of balance and numbness.   Psychiatric/Behavioral: Negative for altered mental status, memory loss and substance abuse.   Allergic/Immunologic: Negative for hives and persistent infections.     Physical Exam:  /98 (BP Location: Right arm, Patient Position: Lying)   Temp 98.1 °F (36.7 °C) (Oral)   Resp 20   SpO2 98%   Temp:  [98.1 °F (36.7 °C)] 98.1 °F (36.7 °C)  Resp:  [20] 20  BP:  (160)/(98) 160/98    Vitals signs reviewed.   Constitutional:       General: Not in acute distress.     Appearance: Normal and healthy appearance. Well-developed. Not toxic-appearing or diaphoretic.   Eyes:      General: Lids are normal.      Extraocular Movements: Extraocular movements intact.      Pupils: Pupils are equal, round, and reactive to light.   HENT:      Head: Normocephalic and atraumatic.      Right Ear: External ear normal.      Left Ear: External ear normal.      Nose: Nose normal.    Mouth/Throat:      Mouth: Mucous membranes are not pale, not dry and not cyanotic.      Pharynx: Uvula midline. No oropharyngeal exudate.   Neck:      Musculoskeletal: Normal range of motion and neck supple. No edema.      Thyroid: No thyroid mass or thyromegaly.      Vascular: No carotid bruit, hepatojugular reflux or JVD.      Trachea: No tracheal deviation.      Lymphadenopathy: No cervical adenopathy.   Pulmonary:      Effort: Pulmonary effort is normal. No accessory muscle usage or respiratory distress.      Breath sounds: Normal breath sounds. No wheezing. No rhonchi. No rales.   Chest:      Chest wall: Not tender to palpatation.   Cardiovascular:      Normal rate. Regular rhythm.      Murmurs: There is no murmur.      No gallop.   Pulses:     Intact distal pulses.   Edema:     Peripheral edema absent.   Abdominal:      General: Bowel sounds are normal. There is no distension or abdominal bruit.      Palpations: Abdomen is soft. There is no pulsatile midline mass.      Tenderness: There is no abdominal tenderness.   Musculoskeletal: Normal range of motion.         General: No tenderness or deformity.      Extremities: No clubbing present.  Skin:     General: Skin is warm and dry. There is no cyanosis.      Findings: No erythema or rash.   Neurological:      General: No focal deficit present.      Mental Status: Oriented to person, place, and time and oriented to person, place and time.      Cranial Nerves: No  cranial nerve deficit.   Psychiatric:         Attention and Perception: Attention normal.         Mood and Affect: Mood normal.         Speech: Speech normal.         Behavior: Behavior normal. Behavior is cooperative.         Thought Content: Thought content normal.       Diagnostic Data:    Labs at outside hospital: White blood cell count 9.3, hemoglobin 13.7, hematocrit 41, platelets 393  Sodium 135, potassium 3.8, chloride 101, CO2 of 21, BUN 25, creatinine 1.8, glucose 149  Covid negative  Normal transaminase levels    ECG here shows sinus rhythm with ST elevation in V1, V2, V3 with ST depression noted in the inferior leads    ASSESSMENT/PLAN:    1.  Acute anterior STEMI  2.  Coronary artery disease and native coronary artery, status post previous LAD PCI  3.  Essential hypertension  4.  Mixed hyperlipidemia    -We will plan urgent cardiac catheterization at this time.  Further plans pending the results the patient's cardiac catheterization.  -I discussed cardiac catheterization, the procedure, risks (including bleeding, infection, vascular damage [including minor oozing, bruising, bleeding, and up to and including the need for vascular surgery], contrast reaction, renal failure, respiratory failure, heart attack, stroke, arrhythmia and even death), benefits, and alternatives and the patient has voiced understanding and is willing to proceed.

## 2021-02-01 NOTE — PLAN OF CARE
Goal Outcome Evaluation:  Plan of Care Reviewed With: patient  Progress: improving  Outcome Summary: pt admitted. cath site protocol in place, pt hob flat. cath site soft, no signs of drainage. pulses good. IV fluids continued. vss. pt resting comfortably

## 2021-02-02 VITALS
OXYGEN SATURATION: 98 % | RESPIRATION RATE: 18 BRPM | BODY MASS INDEX: 32.26 KG/M2 | HEIGHT: 74 IN | DIASTOLIC BLOOD PRESSURE: 96 MMHG | HEART RATE: 72 BPM | WEIGHT: 251.4 LBS | SYSTOLIC BLOOD PRESSURE: 130 MMHG | TEMPERATURE: 97.6 F

## 2021-02-02 DIAGNOSIS — I21.02 STEMI INVOLVING LEFT ANTERIOR DESCENDING CORONARY ARTERY (HCC): ICD-10-CM

## 2021-02-02 DIAGNOSIS — Z95.5 S/P DRUG ELUTING CORONARY STENT PLACEMENT: Primary | ICD-10-CM

## 2021-02-02 LAB
ANION GAP SERPL CALCULATED.3IONS-SCNC: 11 MMOL/L (ref 5–15)
BUN SERPL-MCNC: 21 MG/DL (ref 6–20)
BUN/CREAT SERPL: 17.4 (ref 7–25)
CALCIUM SPEC-SCNC: 9.9 MG/DL (ref 8.6–10.5)
CHLORIDE SERPL-SCNC: 101 MMOL/L (ref 98–107)
CO2 SERPL-SCNC: 23 MMOL/L (ref 22–29)
CREAT SERPL-MCNC: 1.21 MG/DL (ref 0.76–1.27)
GFR SERPL CREATININE-BSD FRML MDRD: 62 ML/MIN/1.73
GLUCOSE SERPL-MCNC: 141 MG/DL (ref 65–99)
POTASSIUM SERPL-SCNC: 3.9 MMOL/L (ref 3.5–5.2)
QT INTERVAL: 344 MS
QT INTERVAL: 370 MS
QTC INTERVAL: 387 MS
QTC INTERVAL: 407 MS
SODIUM SERPL-SCNC: 135 MMOL/L (ref 136–145)

## 2021-02-02 PROCEDURE — 25010000002 ENOXAPARIN PER 10 MG: Performed by: INTERNAL MEDICINE

## 2021-02-02 PROCEDURE — 80048 BASIC METABOLIC PNL TOTAL CA: CPT | Performed by: NURSE PRACTITIONER

## 2021-02-02 PROCEDURE — 99239 HOSP IP/OBS DSCHRG MGMT >30: CPT | Performed by: NURSE PRACTITIONER

## 2021-02-02 RX ORDER — NITROGLYCERIN 0.4 MG/1
0.4 TABLET SUBLINGUAL
Qty: 25 TABLET | Refills: 1 | Status: SHIPPED | OUTPATIENT
Start: 2021-02-02

## 2021-02-02 RX ORDER — ATORVASTATIN CALCIUM 40 MG/1
40 TABLET, FILM COATED ORAL DAILY
Qty: 90 TABLET | Refills: 3 | Status: SHIPPED | OUTPATIENT
Start: 2021-02-02 | End: 2021-02-22 | Stop reason: HOSPADM

## 2021-02-02 RX ORDER — LISINOPRIL 5 MG/1
5 TABLET ORAL
Qty: 30 TABLET | Refills: 0 | Status: SHIPPED | OUTPATIENT
Start: 2021-02-02 | End: 2021-02-17 | Stop reason: SDUPTHER

## 2021-02-02 RX ORDER — LISINOPRIL 5 MG/1
5 TABLET ORAL
Status: DISCONTINUED | OUTPATIENT
Start: 2021-02-02 | End: 2021-02-02 | Stop reason: HOSPADM

## 2021-02-02 RX ORDER — ATORVASTATIN CALCIUM 40 MG/1
40 TABLET, FILM COATED ORAL NIGHTLY
Qty: 30 TABLET | Refills: 0 | Status: SHIPPED | OUTPATIENT
Start: 2021-02-02 | End: 2021-02-22 | Stop reason: SDUPTHER

## 2021-02-02 RX ADMIN — TICAGRELOR 90 MG: 90 TABLET ORAL at 08:42

## 2021-02-02 RX ADMIN — LISINOPRIL 5 MG: 5 TABLET ORAL at 12:20

## 2021-02-02 RX ADMIN — ASPIRIN 81 MG: 81 TABLET ORAL at 08:42

## 2021-02-02 RX ADMIN — ENOXAPARIN SODIUM 40 MG: 40 INJECTION SUBCUTANEOUS at 06:00

## 2021-02-02 RX ADMIN — METOPROLOL TARTRATE 25 MG: 25 TABLET, FILM COATED ORAL at 08:42

## 2021-02-02 NOTE — PLAN OF CARE
Goal Outcome Evaluation:  Plan of Care Reviewed With: patient  Progress: no change  Outcome Summary: pt transferred to  this shift. vitals stable. up ad penelope. some tenderness at cath site. refusing IV site change. safety maintained.

## 2021-02-02 NOTE — PAYOR COMM NOTE
"21 [facility] [phone]  [phone]      ER ADMISSION TO INPATIENT ON 21    [facility].    ID [id number]    [facility]    FAXIN CLINICAL FOR INPATIENT REVIEW.    Dafne Anthony (55 y.o. Male)     Date of Birth Social Security Number Address Home Phone MRN    1965  [address] [place] [id number] [phone] [id number]    [employer]opalian Marital Status          [facility] Single       Admission Date Admission Type Admitting Provider Attending Provider Department, Room/Bed    21 Emergency Randla Jeffrey MD Faulkner, Michael Wade, MD [address], 406/    Discharge Date Discharge Disposition Discharge Destination                       Attending Provider: Randal Jeffrey MD    Allergies: No Known Allergies    Isolation: None   Infection: None   Code Status: CPR    Ht: 188 cm (74.02\")   Wt: 114 kg (251 lb 6.4 oz)    Admission Cmt: None   Principal Problem: None                Active Insurance as of 2021     Primary Coverage     Payor Plan Insurance Group Employer/Plan Group    [facility]      Payor Plan Address Payor Plan Phone Number Payor Plan Fax Number Effective Dates    [address] [phone]  2019 - None Entered    [employer] [address]       Subscriber Name Subscriber Birth Date Member ID       DAFNE ANTHONY 1965 [id number]                 Emergency Contacts      (Rel.) Home Phone Work Phone Mobile Phone    CARMEN LANCE [phone] -- --    Estella Jaramillo (Mother) -- -- --    Tammie Lewis (Sister) -- -- --        [facility] Encounter Date/Time: 2021 0246   Hospital Account: [id number]    MRN: [id number]   Patient:  Dafne Anthony   Contact Serial #: [id number]   SSN:          ENCOUNTER             Patient Class: Inpatient   Unit: [address] Service: Cardiology     Bed: 406/1   Admitting Provider: Randal Jeffrey, *   Referring Physician:     Attending Provider: " Randal Jeffrey, *   Adm Diagnosis: ST elevation myocardial *               PATIENT          Name: Dafne Anthony : 1965 (55 yrs)   Address: [address] Sex: Male   City: [place] [address]   County: [place]   Marital Status: SINGLE Ethnicity: NOT                                                                              Race: WHITE   Primary Care Provider: Provider, No Known Patients Phone: Home Phone: [phone]         EMERGENCY CONTACT   Contact Name Legal Guardian? Relationship to Patient Home Phone Work Phone   1. CARMEN LANCE  2. Ella, Pat         Mother [phone]              GUARANTOR            Guarantor: Dafne Anthony     : 1965   Address: [address] Sex: Male     [place] [address]     Relation to Patient: Self       Home Phone: [phone]   Guarantor ID: [id number]       Work Phone:     GUARANTOR EMPLOYER   Employer:           Status: UNKNOWN   COVERAGE          PRIMARY INSURANCE   Payor: [employer] Plan: [facility]   Group Number:   Insurance Type: INDEMNITY   Subscriber Name: DAFNE ANTHONY Subscriber : 1965   Subscriber ID: [id number] Coverage Address: [address] [address]   Pat. Rel. to Subscriber: Self Coverage Phone: [phone]   SECONDARY INSURANCE   Payor: N/A Plan: N/A   Group Number:   Insurance Type:     Subscriber Name:   Subscriber :     Subscriber ID:   Coverage Address:     Pat. Rel. to Subscriber:   Coverage Phone:        Contact Serial # ([id number])  `       2021    Chart ID ([id number])            Encounter Information     Department Encounter #   2021  2:46 AM  Pad 4b [id number]      Randal Jeffrey MD   Physician   Cardiology   H&P   Signed   Date of Service:  21   Creation Time:  21            Signed        Expand All Collapse All      Show:Clear all  [x]Manual[x]Template[]Copied    Added by:  [x]Randal Jeffrey MD    []Trell for details     Chief  Complaint   Patient presents with   • Chest Pain      HPI: This is a 55-year-old male with a history of coronary artery disease, status post previous PCI in 2016 to the LAD, also with hypertension hyperlipidemia, presenting on transfer from outside hospital with an acute STEMI.  The patient says that his pain started around 945 while he was at rest, located substernal, severe, waxing and waning but not completely relieved until going to an outside hospital, nonradiating, no associated signs or symptoms.     The patient presented to an outside hospital at approximately 1015.  Labs were obtained and initial EKG was performed.  After nitroglycerin was given, the patient reportedly was chest pain-free.  Ultimately, the ECG was transmitted to our staff where a code STEMI was called.  Patient is now here with chest pain that is rated at a 4/10 in severity.     Chest Pain   This is a new problem. The current episode started today. The onset quality is sudden. The problem occurs constantly. The problem has been waxing and waning. The pain is present in the substernal region. The pain is severe. The quality of the pain is described as pressure. The pain does not radiate. Associated symptoms include back pain. Pertinent negatives include no abdominal pain, cough, dizziness, fever, headaches, hemoptysis, irregular heartbeat, nausea, numbness, orthopnea, palpitations, PND, shortness of breath, syncope or vomiting. The pain is aggravated by nothing. He has tried nitroglycerin for the symptoms. The treatment provided significant relief. Risk factors include male gender.   His past medical history is significant for CAD, hyperlipidemia and hypertension.                       Medical History        Past Medical History:   Diagnosis Date   • Coronary artery disease     • Hyperlipidemia     • Hypertension     • Myocardial infarction (CMS/[facility])          Surgical      Review of Systems   Constitution: Negative for chills, fever, night  sweats and weight loss.   HENT: Negative for congestion and hearing loss.    Eyes: Negative for blurred vision and pain.   Cardiovascular: Positive for chest pain. Negative for dyspnea on exertion, irregular heartbeat, leg swelling, orthopnea, palpitations, paroxysmal nocturnal dyspnea and syncope.   Respiratory: Negative for cough, hemoptysis, shortness of breath and wheezing.    Endocrine: Negative for cold intolerance, heat intolerance, polydipsia and polyuria.   Hematologic/Lymphatic: Negative for adenopathy and bleeding problem. Does not bruise/bleed easily.   Skin: Negative for color change, poor wound healing and rash.   Musculoskeletal: Positive for arthritis and back pain. Negative for myalgias and neck pain.   Gastrointestinal: Positive for heartburn. Negative for abdominal pain, change in bowel habit, nausea and vomiting.   Genitourinary: Negative for dysuria and frequency.   Neurological: Negative for dizziness, focal weakness, headaches, light-headedness, loss of balance and numbness.   Psychiatric/Behavioral: Negative for altered mental status, memory loss and substance abuse.   Allergic/Immunologic: Negative for hives and persistent infections.           Physical Exam:  /98 (BP Location: Right arm, Patient Position: Lying)   Temp 98.1 °F (36.7 °C) (Oral)   Resp 20   SpO2 98%   Temp:  [98.1 °F (36.7 °C)] 98.1 °F (36.7 °C)  Resp:  [20] 20  BP: (160)/(98) 160/98     Vitals signs reviewed.   Constitutional:       General: Not in acute distress.     Appearance: Normal and healthy appearance. Well-developed. Not toxic-appearing or diaphoretic.   Eyes:      General: Lids are normal.      Extraocular Movements: Extraocular movements intact.      Pupils: Pupils are equal, round, and reactive to light.   HENT:      Head: Normocephalic and atraumatic.      Right Ear: External ear normal.      Left Ear: External ear normal.      Nose: Nose normal.    Mouth/Throat:      Mouth: Mucous membranes are not  pale, not dry and not cyanotic.      Pharynx: Uvula midline. No oropharyngeal exudate.   Neck    Pharynx: Uvula midline. No oropharyngeal exudate.   Neck:      Musculoskeletal: Normal range of motion and neck supple. No edema.      Thyroid: No thyroid mass or thyromegaly.      Vascular: No carotid bruit, hepatojugular reflux or JVD.      Trachea: No tracheal deviation.      Lymphadenopathy: No cervical adenopathy.   Pulmonary:      Effort: Pulmonary effort is normal. No accessory muscle usage or respiratory distress.      Breath sounds: Normal breath sounds. No wheezing. No rhonchi. No rales.   Chest:      Chest wall: Not tender to palpatation.   Cardiovascular:      Normal rate. Regular rhythm.      Murmurs: There is no murmur.      No gallop.   Pulses:     Intact distal pulses.   Edema:     Peripheral edema absent.   Abdominal:  General: Bowel sounds are normal. There is no distension or abdominal bruit.      Palpations: Abdomen is soft. There is no pulsatile midline mass.      Tenderness: There is no abdominal tenderness.   Musculoskeletal: Normal range of motion.         General: No tenderness or deformity.      Extremities: No clubbing present.  Skin:     General: Skin is warm and dry. There is no cyanosis.      Findings: No erythema or rash.   Neurological:      General: No focal deficit present.      Mental Status: Oriented to person, place, and time and oriented to person, place and time.      Cranial Nerves: No cranial nerve deficit.   Psychiatric:         Attention and Perception: Attention normal.         Mood and Affect: Mood normal.         Speech: Speech normal.         Behavior: Behavior normal. Behavior is cooperative.         Thought Content: Thought content normal.       Diagnostic Data:     Labs at outside hospital: White blood cell count 9.3, hemoglobin 13.7, hematocrit 41, platelets 393  Sodium 135, potassium 3.8, chloride 101, CO2 of 21, BUN 25, creatinine 1.8, glucose 149  Covid  negative  Normal transaminase levels     ECG here shows sinus rhythm with ST elevation in V1, V2, V3 with ST depression noted in the inferior leads     ASSESSMENT/PLAN:     1.  Acute anterior STEMI  2.  Coronary artery disease and native coronary artery, status post previous LAD PCI  3.  Essential hypertension  4.  Mixed hyperlipidemia     -We will plan urgent cardiac catheterization at this time.  Further plans pending the results the patient's cardiac catheterization.  -I discussed cardiac catheterization, the procedure, risks (including bleeding, infection, vascular damage [including minor oozing, bruising, bleeding, and up to and including the need for vascular surgery], contrast reaction, renal failure, respiratory failure, heart attack, stroke, arrhythmia and even death), benefits, and alternatives and the patient has voiced understanding and is willing to proceed.       [facility] CATH LAB  [address] [place] [id number] [phone]             Eduardo Altman  Cardiac Catheterization/Vascular Study  Order# [id number]  Reading physician: Randal Jeffrey MD Ordering physician: Randal Jeffrey MD Study date: 21    Procedures    Left Heart Cath      Patient Information    Patient Name   Eduardo Altman MRN   [id number] Sex   Male  (Age)   1965 (55 y.o.)   Race Ethnicity Encounter Category   White or  Not  or  Emergency   PACS Images     Show images for Cardiac Catheterization/Vascular Study    Printable Vessel Diagram    Printable Vessel Diagram      Physicians    Panel Physicians Referring Physician Case Authorizing Physician   Randal Jeffrey MD (Primary)     Pre Procedure Diagnosis    STEMI       Conclusion    Date of Procedure: 21     Procedures Performed:     1. Selective coronary angiography  2. Acute myocardial infarction PCI of the proximal left anterior descending artery  3. Administration and monitoring of conscious  sedation during the procedure     Indication: Acute anterior STEMI     Risk, Benefits, and Alternatives discussed with the patient and/or family.  Plan is for moderate sedation, and the patient agrees to proceed with the procedure.  An immediate assessment was done prior to the administration of moderate sedation.     Access: 6 [place] right femoral artery, Perclose  Diagnostic Catheters: 6 [place] XB 3.5, 6 [place] JR4     Procedural Details: After emergent consent was obtained, the patient was placed on the cardiac catheterization table. The skin overlying the patient's right groin was prepped and draped in the usual sterile fashion. Timeout was taken to confirm the correct patient and procedure. IV Versed and fentanyl were used to achieve conscious sedation. Lidocaine was administered for local anesthesia over the right femoral artery.  Modified Seldinger technique was then used to place a 6 [place]er sheath in the right femoral artery.  An initial diagnostic image of the left coronary system was obtained with a 6 [place] XB 3.5 guide catheter. After this, the decision was made to proceed with a percutaneous coronary intervention. The details of this procedure will be described separately below.  Following the intervention, a 6 [place] JR4 catheter was used to perform coronary angiography of the right coronary artery.  Upon completion of all procedures, a femoral angiogram and was performed and revealed arteriotomy site suitable for a closure device. A Perclose was used to achieve hemostasis. The patient tolerated the procedures well. There were no immediate complications.  During the procedure, I supervised the administration and monitoring of conscious sedation.  This included monitoring of the patient's status, oximetry, hemodynamics.  The patient received the first dose of IV sedation at 3:02 AM and I left the room at 3:47 AM, accounting for a total of 45 minutes of face-to-face time with the patient in the cath lab  today.     Results:     Selective Coronary Angiography:     Left Main Coronary Artery: The left main coronary artery arises from the left coronary cusp and bifurcates into the LAD and left circumflex arteries. Luminal irregularities are present, but no significant disease.     Left Anterior Descending Artery: The left anterior descending artery arises from the distal left main coronary artery and is 100% occluded just proximal to the previously placed stent in the LAD.  There is also a very proximal diagonal branch that also appears to be occluded.     Left Circumflex: The left circumflex artery arises from the distal left main artery and supplies 2 obtuse marginal branches.  The proximal portion of the circumflex has mild disease.  The first obtuse marginal branch has an 80% stenosis in its proximal segment.  The second obtuse marginal branch has mild disease throughout the course of this branch.     Right Coronary Artery: The right coronary artery arises from the right coronary cusp and is dominant for the posterior circulation.  The right coronary artery has mild disease throughout the course the vessel, although the proximal third of the right coronary artery has more moderate disease, likely with irregularities up to 40%.  Distally, there is a posterior descending artery as well as a PL system, neither of which has any significant disease, other than mild irregularities.     Percutaneous Coronary Intervention to the proximal left anterior descending artery, extending into the mid vessel:      A 6 [place] XB 3.5 guide catheter was used for the procedure.  The initial lesion showed a 100% occlusion just proximal to the previously placed stent.  A 0.014 inch prowater wire was advanced easily into the distal LAD.  Also, of note, Angiomax bolus and infusion were used for therapeutic anticoagulation during the procedure.  A 2.0 mm balloon was used to dilate across the occluded segment.  This then revealed a very  proximal diagonal branch that was occluded, likely with a heavy thrombus burden in the proximal vessel.  The stent was widely patent distally with minimal late luminal loss.  Just distal to the stent, there is a 40-50% stenosis near the takeoff of the second diagonal branch.  The remainder of the LAD is a reasonably healthy vessel with only mild disease and terminates at the apex.  After reviewing the images, it was thought best to proceed with PCI with a stent in the proximal LAD, extending to the previously placed stent.  Therefore, this new stent would reside proximal to the previously placed stent.  A 3.0 x 23 mm Xience drug-eluting stent was placed.  This was noted to be slightly undersized in the proximal portion, therefore a 3.5 mm noncompliant balloon was used to aggressively post dilate the proximal edge of the stent.  Following this, a 2.75 mm noncompliant balloon was used to dilate throughout the previously placed stent.  It was thought best to proceed with PCI at this area as well, extending distally, just past the takeoff of the second diagonal branch, to create a region of healthy vessel, extending into healthy vessel.  Therefore, a 2.75 x 23 mm Xience drug-eluting stent was placed with the distal edge just overlapping the takeoff of the second diagonal branch and extending proximally within the original stent, which was also a 2.75 mm stent.  After the stent deployment, a 2.75 mm noncompliant balloon was used to aggressively post dilate this stent, along with more proximally into the 3.0 mm stent.  Therefore, the entire stented segment was aggressively postdilated.  Following this, there is NUBIA-3 flow down the LAD.  A wire was advanced into the occluded proximal diagonal branch, however a balloon was never able to be passed, therefore it was thought best not to proceed with any further interventions at this area.  At the completion of the procedure, flow down this diagonal branch is NUBIA II at best,  however is improved after the PCI.  The second and third diagonal branches remain widely patent although third diagonal branch is far distal to the stented segment.     Total contrast: 190 mL     Fluoroscopy time: 7:37 min     X-ray exposure: 1168 mGy     Estimated Blood Loss: Minimal     Specimens: None     Complications: None     Impression:  1.  Acute anterior STEMI, secondary to proximal LAD occlusion, just proximal to previously placed stent.  2.  Successful PCI to the proximal mid left anterior descending artery as outlined above.  3.  Severe stenosis in the first obtuse marginal branch, left to be medically managed at this time.     Plan:  1.  Routine post procedure orders, 4 hours bedrest, gentle IV fluids.  2.  Angiomax for an additional hour, then discontinue.  3.  Aspirin 81 mg daily, Brilinta 90 mg p.o. twice daily (180 mg administered in the Cath Lab).  4.  High intensity statin therapy, beta-blocker therapy.  5.  Echocardiogram to evaluate left ventricular systolic function.  6.  Cardiac rehab referral at discharge.         Time Under Physician Observation    Name Panel   Randal Jeffrey MD Panel 1   Role: Primary   Time Period: 2/1/2021 0304 - 2/1/2021 0347    Total Sedation Time    Moderate sedation event time was not documented.      Vessel Access     Radiation     Event   3:47 AM Radiation Tracking   Details: Panel 1: Randal Jeffrey MD   Cumulative Air Kerma (mGy) = 1168.000; Fluoro Time (min) = 7.4; DAP (mGy-cm2) = 69828.000   User: LE      Total Contrast       Contains critical data Troponin  Order: [id number]  Status:  Final result   Visible to patient:  No (not released) Next appt:  None  Specimen Information: Blood        Component   Ref Range & Units 1d ago   Troponin T   0.000 - 0.030 ng/mL 2.590High Critical     Resulting Agency  [facility]      Narrative  Performed by:  [facility]  Troponin T Reference Range:   <= 0.03 ng/mL-   Negative for AMI   >0.03 ng/mL-     Abnormal for  myocardial necrosis.  Clinicians would have to utilize clinical acumen, EKG, Troponin and serial changes to determine if it is an Acute Myocardial Infarction or myocardial injury due to an underlying chronic condition.            Contains abnormal data Basic Metabolic Panel  Order: [id number]  Status:  Final result   Visible to patient:  No (not released)   Next appt:  None  Specimen Information: Blood        Component   Ref Range & Units 1d ago   Glucose   65 - 99 mg/dL 148High     BUN   6 - 20 mg/dL 26High     Creatinine   0.76 - 1.27 mg/dL 1.41High     Sodium   136 - 145 mmol/L 135Low     Potassium   3.5 - 5.2 mmol/L 5.0    Comment: Slight hemolysis detected by analyzer. Results may be affected.   Chloride   98 - 107 mmol/L 104    CO2   22.0 - 29.0 mmol/L 21.0Low     Calcium   8.6 - 10.5 mg/dL 9.4    eGFR Non African Amer   >60 mL/min/1.73 52Low     BUN/Creatinine Ratio   7.0 - 25.0 18.4    Anion Gap   5.0 - 15.0 mmol/L 10.0             Current Facility-Administered Medications   Medication Dose Route Frequency Provider Last Rate Last Admin   • acetaminophen (TYLENOL) tablet 650 mg  650 mg Oral Q4H PRN Randal Jeffrey MD       • aspirin EC tablet 81 mg  81 mg Oral Daily Randal Jeffrey MD       • atorvastatin (LIPITOR) tablet 40 mg  40 mg Oral Nightly Randal Jeffrey MD   40 mg at 02/01/21 2008   • enoxaparin (LOVENOX) syringe 40 mg  40 mg Subcutaneous Q24H Randal Jeffrey MD   40 mg at 02/02/21 0600   • labetalol (NORMODYNE,TRANDATE) injection 10 mg  10 mg Intravenous Q6H PRN Randal Jeffrey MD       • metoprolol tartrate (LOPRESSOR) tablet 25 mg  25 mg Oral Q12H Randal Jeffrey MD   25 mg at 02/01/21 2008   • nitroglycerin (NITROSTAT) SL tablet 0.4 mg  0.4 mg Sublingual Q5 Min PRN Randal Jeffrey MD       • ondansetron (ZOFRAN) tablet 4 mg  4 mg Oral Q6H PRN Randal Jeffrey MD        Or   • ondansetron (ZOFRAN) injection 4 mg  4 mg Intravenous Q6H  PRRandal Amaya MD       • Pharmacy Meds to Bed Consult   Does not apply Daily Randal Jeffrey MD   Stopped at 02/01/21 1645   • sodium chloride 0.9 % flush 10 mL  10 mL Intravenous Q12H Randal Jeffrey MD   10 mL at 02/01/21 2009   • sodium chloride 0.9 % flush 10 mL  10 mL Intravenous PRN Randal Jeffrey MD       • ticagrelor (BRILINTA) tablet 90 mg  90 mg Oral Q12H Randal Jeffrey MD   90 mg at 02/01/21 1645

## 2021-02-02 NOTE — PLAN OF CARE
Goal Outcome Evaluation:  Plan of Care Reviewed With: patient  Progress: improving  Outcome Summary: VSS, no c/o of pain; cath site clean,dry, soft, and intact; gauze dressing in place; 2+ pedal pulses bilat. per palpation; no chest pain, no SOA; up ad penelope to bathroom; sinus 66-81 on tele; safety maintained

## 2021-02-02 NOTE — PAYOR COMM NOTE
"2/2/21  [facility]      D/C SUMMARY           Dafne Anthony (55 y.o. Male)     Date of Birth Social Security Number Address Home Phone MRN    [id number]  [address] [place] [id number] [phone] [id number]    [place] Marital Status          [facility] Single       Admission Date Admission Type Admitting Provider Attending Provider Department, Room/Bed    2/1/21 Emergency Randal Jeffrey MD Faulkner, Michael Wade, MD [facility] 4B, 406/1    Discharge Date Discharge Disposition Discharge Destination         Home or Self Care              Attending Provider: Randal Jeffrey MD    Allergies: No Known Allergies    Isolation: None   Infection: None   Code Status: CPR    Ht: 188 cm (74.02\")   Wt: 114 kg (251 lb 6.4 oz)    Admission Cmt: None   Principal Problem: None                Active Insurance as of 2/1/2021     Primary Coverage     Payor Plan Insurance Group Employer/Plan Group    [facility]      Payor Plan Address Payor Plan Phone Number Payor Plan Fax Number Effective Dates    [address] [phone]  5/31/2019 - None Entered    [facility] [id number]       Subscriber Name Subscriber Birth Date Member ID       DAFNE ANTHONY 1965 [id number]                 Emergency Contacts      (Rel.) Home Phone Work Phone Mobile Phone    CARMEN LANCE [phone] -- --    EllaEstella (Mother) -- -- --    Tammie Lewis (Sister) -- -- --               Discharge Summary      Winston Medeiros APRN at 02/02/21 1134          [facility] HEART GROUP DISCHARGE    Date of Discharge:  2/2/2021    Discharge Diagnosis:  ST elevation myocardial infarction (STEMI) (CMS/HCC)  Ischemic Cardiomyopathy   Hyperlipidemia   Coronary Artery Disease  Former Smoker  GERD        Hospital Course  Patient is a 55 y.o. male presented 2/1/21 in the early hours of the morning for STEMI and transfer from outlying facility. He had an acute onset of chest pain and presented " to [facility] ER. CODE STEMI was activated and patient was transferred to our facility. He was taken urgently to cath lab. Heart cath revealed proximal LAD occlusion just proximal to previous stent. This area was treated successfully with PCI.He had 3.0x23 mm drug eluting stent proximal previous stent and 2.75 x 23 mm overlapping stent and in the previous stent. Patient had a STEMI in 2016 with Dr. Broadbent and had stent placed to LAD in 2016- LVEF at that time was note dot be 40%. He did have an echo in 2019 at [facility] that showed a recovered LVEF of 55-60%. He has not followed with a cardiologist in a little over a year. He has tolerated the procedure well. His home medications of Lisinopril,  Dyazide, Bystolic have been held due to hypotension. He was started on Lopressor. Blood pressure this morning 123/73. Will initiate Lisinopril at 5 mg- home dose 20 mg. Suspect will be able to resume this at follow up. He has had no further chest pain. Up and ambulating without any issue. He is ready for discharge.     Procedures Performed  Procedure(s):  Left Heart Cath       Consults:   Consults     No orders found for last 30 day(s).          Pertinent Test Results:  Lab Results (last 72 hours)     Procedure Component Value Units Date/Time    Lipid Panel [[id number]]  (Abnormal) Collected: 02/01/21 0428    Specimen: Blood Updated: 02/01/21 0716     Total Cholesterol 129 mg/dL      Triglycerides 339 mg/dL      HDL Cholesterol 19 mg/dL      LDL Cholesterol  57 mg/dL      VLDL Cholesterol 53 mg/dL      LDL/HDL Ratio 2.22    Narrative:      Cholesterol Reference Ranges  (U.S. Department of Health and Human Services ATP III Classifications)    Desirable          <200 mg/dL  Borderline High    200-239 mg/dL  High Risk          >240 mg/dL      Triglyceride Reference Ranges  (U.S. Department of Health and Human Services ATP III Classifications)    Normal           <150 mg/dL  Borderline High  150-199 mg/dL  High              200-499 mg/dL  Very High        >500 mg/dL    HDL Reference Ranges  (U.S. Department of Health and Human Services ATP III Classifcations)    Low     <40 mg/dl (major risk factor for CHD)  High    >60 mg/dl ('negative' risk factor for CHD)        LDL Reference Ranges  (U.S. Department of Health and Human Services ATP III Classifcations)    Optimal          <100 mg/dL  Near Optimal     100-129 mg/dL  Borderline High  130-159 mg/dL  High             160-189 mg/dL  Very High        >189 mg/dL    Hemoglobin A1c [[id number]]  (Abnormal) Collected: 02/01/21 0428    Specimen: Blood Updated: 02/01/21 0537     Hemoglobin A1C 6.00 %     Narrative:      Hemoglobin A1C Ranges:    Increased Risk for Diabetes  5.7% to 6.4%  Diabetes                     >= 6.5%  Diabetic Goal                < 7.0%    Basic Metabolic Panel [[id number]]  (Abnormal) Collected: 02/01/21 0428    Specimen: Blood Updated: 02/01/21 0519     Glucose 148 mg/dL      BUN 26 mg/dL      Creatinine 1.41 mg/dL      Sodium 135 mmol/L      Potassium 5.0 mmol/L      Comment: Slight hemolysis detected by analyzer. Results may be affected.        Chloride 104 mmol/L      CO2 21.0 mmol/L      Calcium 9.4 mg/dL      eGFR Non African Amer 52 mL/min/1.73      BUN/Creatinine Ratio 18.4     Anion Gap 10.0 mmol/L     Narrative:      GFR Normal >60  Chronic Kidney Disease <60  Kidney Failure <15      CBC (No Diff) [[id number]]  (Abnormal) Collected: 02/01/21 0428    Specimen: Blood Updated: 02/01/21 0509     WBC 7.22 10*3/mm3      RBC 4.61 10*6/mm3      Hemoglobin 12.7 g/dL      Hematocrit 38.9 %      MCV 84.4 fL      MCH 27.5 pg      MCHC 32.6 g/dL      RDW 13.3 %      RDW-SD 41.1 fl      MPV 9.9 fL      Platelets 320 10*3/mm3     Troponin [[id number]]  (Abnormal) Collected: 02/01/21 0415    Specimen: Blood Updated: 02/01/21 0501     Troponin T 2.590 ng/mL     Narrative:      Troponin T Reference Range:  <= 0.03 ng/mL-   Negative for AMI  >0.03 ng/mL-     Abnormal for  myocardial necrosis.  Clinicians would have to utilize clinical acumen, EKG, Troponin and serial changes to determine if it is an Acute Myocardial Infarction or myocardial injury due to an underlying chronic condition.       Results may be falsely decreased if patient taking Biotin.          Results from last 7 days   Lab Units 02/01/21  0428   SODIUM mmol/L 135*   POTASSIUM mmol/L 5.0   CHLORIDE mmol/L 104   CO2 mmol/L 21.0*   BUN mg/dL 26*   CREATININE mg/dL 1.41*   GLUCOSE mg/dL 148*   CALCIUM mg/dL 9.4       Results for orders placed during the hospital encounter of 02/01/21   Adult Transthoracic Echo Complete W/ Cont if Necessary Per Protocol    Narrative · Left ventricular wall thickness is consistent with concentric   hypertrophy.  · The following left ventricular wall segments are hypokinetic: apical   lateral, apical septal and apex hypokinetic.  · Estimated left ventricular EF = 50% Left ventricular systolic function   is normal.  · Left ventricular diastolic function is consistent with (grade I)   impaired relaxation.          Condition on Discharge:  Stable     Physical Exam at Discharge    Vital Signs  Temp:  [97 °F (36.1 °C)-98.9 °F (37.2 °C)] 97.6 °F (36.4 °C)  Heart Rate:  [67-81] 72  Resp:  [14-18] 18  BP: (111-146)/(73-95) 123/73    Physical Exam:  Constitutional:       Appearance: Well-developed.   Eyes:      Pupils: Pupils are equal, round, and reactive to light.   HENT:      Head: Normocephalic and atraumatic.   Neck:      Musculoskeletal: Normal range of motion and neck supple.      Vascular: No carotid bruit or JVD.   Pulmonary:      Effort: Pulmonary effort is normal.      Breath sounds: Normal breath sounds.   Cardiovascular:      Normal rate. Regular rhythm.      Comments: Right groin soft and nontender   Pulses:     Intact distal pulses.   Abdominal:      General: Bowel sounds are normal.      Palpations: Abdomen is soft.   Musculoskeletal: Normal range of motion.   Skin:     General: Skin  is warm and dry.   Neurological:      Mental Status: Alert and oriented to person, place, and time.      Deep Tendon Reflexes: Reflexes are normal and symmetric.   Psychiatric:         Behavior: Behavior normal.         Thought Content: Thought content normal.         Judgment: Judgment normal.         Discharge Disposition  Home or Self Care    Discharge Medications     Discharge Medications      New Medications      Instructions Start Date   atorvastatin 40 MG tablet  Commonly known as: LIPITOR   40 mg, Oral, Nightly      atorvastatin 40 MG tablet  Commonly known as: LIPITOR   40 mg, Oral, Daily      metoprolol tartrate 25 MG tablet  Commonly known as: LOPRESSOR   25 mg, Oral, Every 12 Hours Scheduled      nitroglycerin 0.4 MG SL tablet  Commonly known as: NITROSTAT   0.4 mg, Sublingual, Every 5 Minutes PRN, Take no more than 3 doses in 15 minutes.      ticagrelor 90 MG tablet tablet  Commonly known as: BRILINTA   90 mg, Oral, Every 12 Hours Scheduled      ticagrelor 90 MG tablet tablet  Commonly known as: BRILINTA   90 mg, Oral, 2 Times Daily         Changes to Medications      Instructions Start Date   lisinopril 5 MG tablet  Commonly known as: KIMMY WATKINSSTRIL  What changed:   · medication strength  · how much to take  · when to take this   5 mg, Oral, Every 24 Hours Scheduled         Continue These Medications      Instructions Start Date   aspirin 81 MG chewable tablet   81 mg, Oral, Daily      fenofibrate 160 MG tablet   160 mg, Oral, Daily      pantoprazole 40 MG EC tablet  Commonly known as: PROTONIX   40 mg, Oral, Daily         Stop These Medications    nebivolol 10 MG tablet  Commonly known as: BYSTOLIC     triamterene-hydrochlorothiazide 37.5-25 MG per capsule  Commonly known as: DYAZIDE            Discharge Diet: Low Salt, Heart Healthy     Activity at Discharge:  Do not lift greater than 5 pounds. Do not drive for 48 hours. Do not bend and twist at waist. Shower only for one week, do not submerge in  water. Discussed signs and symptoms of infection including drainage, redness, and pain. Discussed routine groin care.     Follow-up Appointments  Future Appointments   Date Time Provider Department Center   2/16/2021 10:00 AM Pamela Maxwell APRN [facility]       Plan: Patient will be discharged home today. Discussed medications and importance of medications. Discussed importance of Brilinta 90 BID. Continue Aspirin 81 mg daily. Lisinopril will be started at 5mg and increase as BP allows- was previously on 20. Started on Lopressor low  Dose= was previously on Bystolic 10- may switch back if BP will allow. Dyazide on hold. Renal function up on check yesterday- awaiting repeat at time of discharge- if stable okay home. Recheck in 1 week. Increase PO fluids. Cardiac rehab- he wants done in [place] not at [place]. Follow up with PCP in 1 week. Discussed lifestyle modification. LDL at goal but triglycerides elevated. Former smoker. 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        Electronically signed by JE Reese, 02/02/21, 11:34 AM CST.     Time spent on discharge: 45 minutes     Electronically signed by Winston Medeiros APRN at 02/02/21 1150

## 2021-02-02 NOTE — DISCHARGE SUMMARY
[facility] HEART GROUP DISCHARGE    Date of Discharge:  2/2/2021    Discharge Diagnosis:  ST elevation myocardial infarction (STEMI) (CMS/HCC)  Ischemic Cardiomyopathy   Hyperlipidemia   Coronary Artery Disease  Former Smoker  GERD        Hospital Course  Patient is a 55 y.o. male presented 2/1/21 in the early hours of the morning for STEMI and transfer from outlying facility. He had an acute onset of chest pain and presented to [facility] ER. CODE STEMI was activated and patient was transferred to our facility. He was taken urgently to cath lab. Heart cath revealed proximal LAD occlusion just proximal to previous stent. This area was treated successfully with PCI.He had 3.0x23 mm drug eluting stent proximal previous stent and 2.75 x 23 mm overlapping stent and in the previous stent. Patient had a STEMI in 2016 with Dr. Broadbent and had stent placed to LAD in 2016- LVEF at that time was note dot be 40%. He did have an echo in 2019 at [facility] that showed a recovered LVEF of 55-60%. He has not followed with a cardiologist in a little over a year. He has tolerated the procedure well. His home medications of Lisinopril,  Dyazide, Bystolic have been held due to hypotension. He was started on Lopressor. Blood pressure this morning 123/73. Will initiate Lisinopril at 5 mg- home dose 20 mg. Suspect will be able to resume this at follow up. He has had no further chest pain. Up and ambulating without any issue. He is ready for discharge.     Procedures Performed  Procedure(s):  Left Heart Cath       Consults:   Consults     No orders found for last 30 day(s).          Pertinent Test Results:  Lab Results (last 72 hours)     Procedure Component Value Units Date/Time    Lipid Panel [[id number]]  (Abnormal) Collected: 02/01/21 0428    Specimen: Blood Updated: 02/01/21 0716     Total Cholesterol 129 mg/dL      Triglycerides 339 mg/dL      HDL Cholesterol 19 mg/dL      LDL Cholesterol  57 mg/dL      VLDL  Cholesterol 53 mg/dL      LDL/HDL Ratio 2.22    Narrative:      Cholesterol Reference Ranges  (U.S. Department of Health and Human Services ATP III Classifications)    Desirable          <200 mg/dL  Borderline High    200-239 mg/dL  High Risk          >240 mg/dL      Triglyceride Reference Ranges  (U.S. Department of Health and Human Services ATP III Classifications)    Normal           <150 mg/dL  Borderline High  150-199 mg/dL  High             200-499 mg/dL  Very High        >500 mg/dL    HDL Reference Ranges  (U.S. Department of Health and Human Services ATP III Classifcations)    Low     <40 mg/dl (major risk factor for CHD)  High    >60 mg/dl ('negative' risk factor for CHD)        LDL Reference Ranges  (U.S. Department of Health and Human Services ATP III Classifcations)    Optimal          <100 mg/dL  Near Optimal     100-129 mg/dL  Borderline High  130-159 mg/dL  High             160-189 mg/dL  Very High        >189 mg/dL    Hemoglobin A1c [[id number]]  (Abnormal) Collected: 02/01/21 0428    Specimen: Blood Updated: 02/01/21 0537     Hemoglobin A1C 6.00 %     Narrative:      Hemoglobin A1C Ranges:    Increased Risk for Diabetes  5.7% to 6.4%  Diabetes                     >= 6.5%  Diabetic Goal                < 7.0%    Basic Metabolic Panel [[id number]]  (Abnormal) Collected: 02/01/21 0428    Specimen: Blood Updated: 02/01/21 0519     Glucose 148 mg/dL      BUN 26 mg/dL      Creatinine 1.41 mg/dL      Sodium 135 mmol/L      Potassium 5.0 mmol/L      Comment: Slight hemolysis detected by analyzer. Results may be affected.        Chloride 104 mmol/L      CO2 21.0 mmol/L      Calcium 9.4 mg/dL      eGFR Non African Amer 52 mL/min/1.73      BUN/Creatinine Ratio 18.4     Anion Gap 10.0 mmol/L     Narrative:      GFR Normal >60  Chronic Kidney Disease <60  Kidney Failure <15      CBC (No Diff) [[id number]]  (Abnormal) Collected: 02/01/21 0428    Specimen: Blood Updated: 02/01/21 0509     WBC 7.22 10*3/mm3      RBC  4.61 10*6/mm3      Hemoglobin 12.7 g/dL      Hematocrit 38.9 %      MCV 84.4 fL      MCH 27.5 pg      MCHC 32.6 g/dL      RDW 13.3 %      RDW-SD 41.1 fl      MPV 9.9 fL      Platelets 320 10*3/mm3     Troponin [[id number]]  (Abnormal) Collected: 02/01/21 0415    Specimen: Blood Updated: 02/01/21 0501     Troponin T 2.590 ng/mL     Narrative:      Troponin T Reference Range:  <= 0.03 ng/mL-   Negative for AMI  >0.03 ng/mL-     Abnormal for myocardial necrosis.  Clinicians would have to utilize clinical acumen, EKG, Troponin and serial changes to determine if it is an Acute Myocardial Infarction or myocardial injury due to an underlying chronic condition.       Results may be falsely decreased if patient taking Biotin.          Results from last 7 days   Lab Units 02/01/21  0428   SODIUM mmol/L 135*   POTASSIUM mmol/L 5.0   CHLORIDE mmol/L 104   CO2 mmol/L 21.0*   BUN mg/dL 26*   CREATININE mg/dL 1.41*   GLUCOSE mg/dL 148*   CALCIUM mg/dL 9.4       Results for orders placed during the hospital encounter of 02/01/21   Adult Transthoracic Echo Complete W/ Cont if Necessary Per Protocol    Narrative · Left ventricular wall thickness is consistent with concentric   hypertrophy.  · The following left ventricular wall segments are hypokinetic: apical   lateral, apical septal and apex hypokinetic.  · Estimated left ventricular EF = 50% Left ventricular systolic function   is normal.  · Left ventricular diastolic function is consistent with (grade I)   impaired relaxation.          Condition on Discharge:  Stable     Physical Exam at Discharge    Vital Signs  Temp:  [97 °F (36.1 °C)-98.9 °F (37.2 °C)] 97.6 °F (36.4 °C)  Heart Rate:  [67-81] 72  Resp:  [14-18] 18  BP: (111-146)/(73-95) 123/73    Physical Exam:  Constitutional:       Appearance: Well-developed.   Eyes:      Pupils: Pupils are equal, round, and reactive to light.   HENT:      Head: Normocephalic and atraumatic.   Neck:      Musculoskeletal: Normal range of motion  and neck supple.      Vascular: No carotid bruit or JVD.   Pulmonary:      Effort: Pulmonary effort is normal.      Breath sounds: Normal breath sounds.   Cardiovascular:      Normal rate. Regular rhythm.      Comments: Right groin soft and nontender   Pulses:     Intact distal pulses.   Abdominal:      General: Bowel sounds are normal.      Palpations: Abdomen is soft.   Musculoskeletal: Normal range of motion.   Skin:     General: Skin is warm and dry.   Neurological:      Mental Status: Alert and oriented to person, place, and time.      Deep Tendon Reflexes: Reflexes are normal and symmetric.   Psychiatric:         Behavior: Behavior normal.         Thought Content: Thought content normal.         Judgment: Judgment normal.         Discharge Disposition  Home or Self Care    Discharge Medications     Discharge Medications      New Medications      Instructions Start Date   atorvastatin 40 MG tablet  Commonly known as: LIPITOR   40 mg, Oral, Nightly      atorvastatin 40 MG tablet  Commonly known as: LIPITOR   40 mg, Oral, Daily      metoprolol tartrate 25 MG tablet  Commonly known as: LOPRESSOR   25 mg, Oral, Every 12 Hours Scheduled      nitroglycerin 0.4 MG SL tablet  Commonly known as: NITROSTAT   0.4 mg, Sublingual, Every 5 Minutes PRN, Take no more than 3 doses in 15 minutes.      ticagrelor 90 MG tablet tablet  Commonly known as: BRILINTA   90 mg, Oral, Every 12 Hours Scheduled      ticagrelor 90 MG tablet tablet  Commonly known as: BRILINTA   90 mg, Oral, 2 Times Daily         Changes to Medications      Instructions Start Date   lisinopril 5 MG tablet  Commonly known as: JUNEZESTRIL  What changed:   · medication strength  · how much to take  · when to take this   5 mg, Oral, Every 24 Hours Scheduled         Continue These Medications      Instructions Start Date   aspirin 81 MG chewable tablet   81 mg, Oral, Daily      fenofibrate 160 MG tablet   160 mg, Oral, Daily      pantoprazole 40 MG EC  tablet  Commonly known as: PROTONIX   40 mg, Oral, Daily         Stop These Medications    nebivolol 10 MG tablet  Commonly known as: BYSTOLIC     triamterene-hydrochlorothiazide 37.5-25 MG per capsule  Commonly known as: DYAZIDE            Discharge Diet: Low Salt, Heart Healthy     Activity at Discharge:  Do not lift greater than 5 pounds. Do not drive for 48 hours. Do not bend and twist at waist. Shower only for one week, do not submerge in water. Discussed signs and symptoms of infection including drainage, redness, and pain. Discussed routine groin care.     Follow-up Appointments  Future Appointments   Date Time Provider Department Center   2/16/2021 10:00 AM Pamela Maxwell APRN [facility]       Plan: Patient will be discharged home today. Discussed medications and importance of medications. Discussed importance of Brilinta 90 BID. Continue Aspirin 81 mg daily. Lisinopril will be started at 5mg and increase as BP allows- was previously on 20. Started on Lopressor low  Dose= was previously on Bystolic 10- may switch back if BP will allow. Dyazide on hold. Renal function up on check yesterday- awaiting repeat at time of discharge- if stable okay home. Recheck in 1 week. Increase PO fluids. Cardiac rehab- he wants done in [place] not at [place]. Follow up with PCP in 1 week. Discussed lifestyle modification. LDL at goal but triglycerides elevated. Former smoker. 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        Electronically signed by JE Reese, 02/02/21, 11:34 AM CST.     Time spent on discharge: 45 minutes     I have discussed the pt with the Midlevel practitioner. The pt was discharged prior to my arrival or not otherwise available for me to exam and talk to. I agree with the plans and assessment.     Arden Martinez MD

## 2021-02-03 ENCOUNTER — READMISSION MANAGEMENT (OUTPATIENT)
Dept: CALL CENTER | Facility: HOSPITAL | Age: 56
End: 2021-02-03

## 2021-02-03 NOTE — OUTREACH NOTE
Prep Survey      Responses   [facility] facility patient discharged from?  [facility]   Is LACE score < 7 ?  Yes   Emergency Room discharge w/ pulse ox?  No   Eligibility  Readm Mgmt   Discharge diagnosis  ST elevation myocardial infarction    Does the patient have one of the following disease processes/diagnoses(primary or secondary)?  Acute MI (STEMI,NSTEMI)   Does the patient have Home health ordered?  No   Is there a DME ordered?  No   Prep survey completed?  Yes          Paty Menezes RN

## 2021-02-05 ENCOUNTER — READMISSION MANAGEMENT (OUTPATIENT)
Dept: CALL CENTER | Facility: HOSPITAL | Age: 56
End: 2021-02-05

## 2021-02-05 NOTE — OUTREACH NOTE
AMI Week 1 Survey      Responses   [facility] patient discharged from?  [facility]   Does the patient have one of the following disease processes/diagnoses(primary or secondary)?  Acute MI (STEMI,NSTEMI)   Week 1 attempt successful?  Yes   Call start time  1026   Call end time  1028   Discharge diagnosis  ST elevation myocardial infarction    Meds reviewed with patient/caregiver?  Yes   Is the patient having any side effects they believe may be caused by any medication additions or changes?  No   Does the patient have all prescriptions related to this admission filled (includes statins,anticoagulants,HTN meds,anti-arrhythmia meds)  Yes   Is the patient taking all medications as directed (includes completed medication regime)?  Yes   Does the patient have a primary care provider?   Yes   Does the patient have an appointment with their PCP,cardiologist,or clinic within 7 days of discharge?  Yes   Has the patient kept scheduled appointments due by today?  Yes   Has home health visited the patient within 72 hours of discharge?  N/A   Psychosocial issues?  No   Did the patient receive a copy of their discharge instructions?  Yes   Nursing interventions  Reviewed instructions with patient   What is the patient's perception of their health status since discharge?  Improving   Nursing interventions  Nurse provided patient education   Is the patient/caregiver able to teach back signs and symptoms of when to call for help immediately:  Sudden chest discomfort, Sudden discomfort in arms, back, neck or jaw, Shortness of breath at any time, Sudden sweating or clammy skin   Nursing interventions  Nurse provided patient education   Is the pateint /caregiver able to teach back the importance of cardiac rehab?  Yes   Nursing interventions  Provided education on importance of cardiac rehab   Is the patient/caregiver able to teach back lifestyle changes to help prevent MIs  Regular exercise as approved by provider, Quit smoking,  Heart healthy diet, Maintaining a healthy weight   Is the patient/caregiver able to teach back ways to prevent a second heart attack:  Take medications, Follow up with MD   Is the patient/caregiver able to teach back the hierarchy of who to call/visit for symptoms/problems? PCP, Specialist, Home health nurse, Urgent Care, ED, 911  Yes   Week 1 call completed?  Yes          Josr Deluna RN

## 2021-02-09 ENCOUNTER — OFFICE VISIT (OUTPATIENT)
Dept: PRIMARY CARE CLINIC | Age: 56
End: 2021-02-09
Payer: OTHER GOVERNMENT

## 2021-02-09 VITALS
WEIGHT: 249.5 LBS | OXYGEN SATURATION: 98 % | HEIGHT: 72 IN | HEART RATE: 76 BPM | BODY MASS INDEX: 33.79 KG/M2 | DIASTOLIC BLOOD PRESSURE: 80 MMHG | TEMPERATURE: 96.8 F | SYSTOLIC BLOOD PRESSURE: 118 MMHG

## 2021-02-09 DIAGNOSIS — R53.83 FATIGUE, UNSPECIFIED TYPE: ICD-10-CM

## 2021-02-09 DIAGNOSIS — I25.5 ISCHEMIC CARDIOMYOPATHY: ICD-10-CM

## 2021-02-09 DIAGNOSIS — I25.10 CORONARY ARTERY DISEASE INVOLVING NATIVE CORONARY ARTERY OF NATIVE HEART WITHOUT ANGINA PECTORIS: ICD-10-CM

## 2021-02-09 DIAGNOSIS — I21.02 ST ELEVATION (STEMI) MYOCARDIAL INFARCTION INVOLVING LEFT ANTERIOR DESCENDING CORONARY ARTERY (HCC): ICD-10-CM

## 2021-02-09 DIAGNOSIS — I10 ESSENTIAL HYPERTENSION: ICD-10-CM

## 2021-02-09 DIAGNOSIS — Z09 HOSPITAL DISCHARGE FOLLOW-UP: Primary | ICD-10-CM

## 2021-02-09 DIAGNOSIS — E78.2 MIXED HYPERLIPIDEMIA: ICD-10-CM

## 2021-02-09 PROCEDURE — 1111F DSCHRG MED/CURRENT MED MERGE: CPT | Performed by: PEDIATRICS

## 2021-02-09 PROCEDURE — 99215 OFFICE O/P EST HI 40 MIN: CPT | Performed by: PEDIATRICS

## 2021-02-09 RX ORDER — NITROGLYCERIN 0.4 MG/1
0.4 TABLET SUBLINGUAL PRN
COMMUNITY
Start: 2021-02-02 | End: 2022-05-04 | Stop reason: SDUPTHER

## 2021-02-09 RX ORDER — ATORVASTATIN CALCIUM 40 MG/1
40 TABLET, FILM COATED ORAL NIGHTLY
COMMUNITY
Start: 2021-02-02 | End: 2022-07-26

## 2021-02-09 SDOH — ECONOMIC STABILITY: TRANSPORTATION INSECURITY
IN THE PAST 12 MONTHS, HAS LACK OF TRANSPORTATION KEPT YOU FROM MEETINGS, WORK, OR FROM GETTING THINGS NEEDED FOR DAILY LIVING?: NO

## 2021-02-09 ASSESSMENT — PATIENT HEALTH QUESTIONNAIRE - PHQ9
SUM OF ALL RESPONSES TO PHQ QUESTIONS 1-9: 0
SUM OF ALL RESPONSES TO PHQ9 QUESTIONS 1 & 2: 0
SUM OF ALL RESPONSES TO PHQ QUESTIONS 1-9: 0

## 2021-02-09 ASSESSMENT — ENCOUNTER SYMPTOMS
RHINORRHEA: 0
DIARRHEA: 0
ABDOMINAL PAIN: 0
NAUSEA: 0
COUGH: 0
VOMITING: 0
CHEST TIGHTNESS: 0
TROUBLE SWALLOWING: 0
SINUS PRESSURE: 0
ROS SKIN COMMENTS: NO NEW OR SUSPICIOUS SKIN LESIONS
SORE THROAT: 0
EYES NEGATIVE: 1
WHEEZING: 0
ALLERGIC/IMMUNOLOGIC NEGATIVE: 1
CONSTIPATION: 0
SHORTNESS OF BREATH: 0
RESPIRATORY NEGATIVE: 1
GASTROINTESTINAL NEGATIVE: 1
BACK PAIN: 0

## 2021-02-09 NOTE — PROGRESS NOTES
[address] [facility], [address]  Phone [phone]   Fax [phone]      OFFICE VISIT: 2021    Piter Wharton Glendo-: 1965      HPI  Reason For Visit:  Piter Wharton is a 60 y.o. Follow-Up from [facility] to [facility] 21 for chest pain, was sent to [facility] were he recieved a heart cath and placed two stints. Discharged 21, feeling better with no concerns, )    Patient was hospitalized at [facility] for [address] elevated myocardial infarction from early in the morning to 2021 until 2021. Prior to that he was evaluated at [facility] for acute myocardial infarction with ST elevation     height is 6' (1.829 m) and weight is 249 lb 8 oz (113.2 kg). His temporal temperature is 96.8 °F (36 °C). His blood pressure is 118/80 and his pulse is 76. His oxygen saturation is 98%. Body mass index is 33.84 kg/m².     I have reviewed the following with the Mr. Theresa Etienne   Lab Review  Orders Only on 2020   Component Date Value    TSH 2020 1.900     Cholesterol, Fasting 2020 147*    Triglyceride, Fasting 2020 525*    HDL 2020 22*    LDL Calculated 2020 -*    Sodium 2020 141     Potassium 2020 4.4     Chloride 2020 106     CO2 2020 23     Anion Gap 2020 12     Glucose 2020 112*    BUN 2020 16     CREATININE 2020 1.0     GFR Non- 2020 >60     GFR  2020 >59     Calcium 2020 9.2     Total Protein 2020 7.0     Albumin 2020 4.5     Total Bilirubin 2020 0.4     Alkaline Phosphatase 2020 70     ALT 2020 47*    AST 2020 32     WBC 2020 8.9     RBC 2020 4.35*    Hemoglobin 2020 12.6*    Hematocrit 2020 39.2*    MCV 2020 90.1     MCH 2020 29.0     MCHC 2020 32.1*    RDW 2020 14.6*    Platelets  280     MPV 2020 10.3     Neutrophils % 11/02/2020 49.5*    Lymphocytes % 11/02/2020 29.4     Monocytes % 11/02/2020 5.7     Eosinophils % 11/02/2020 14.1*    Basophils % 11/02/2020 0.7     Neutrophils Absolute 11/02/2020 4.4     Immature Granulocytes # 11/02/2020 0.1     Lymphocytes Absolute 11/02/2020 2.6     Monocytes Absolute 11/02/2020 0.50     Eosinophils Absolute 11/02/2020 1.30*    Basophils Absolute 11/02/2020 0.10     Ferritin 11/02/2020 64.1     Iron 11/02/2020 73     PSA 11/02/2020 0.69     Folate 11/02/2020 >20.0     Vitamin B-12 11/02/2020 490     Microalbumin, Random Uri* 11/02/2020 <1.20     Creatinine, Ur 11/02/2020 259.9     Microalbumin Creatinine * 11/02/2020 see below     T4 Free 11/02/2020 0.89*    LDL Direct 11/02/2020 48*     Copies of these are in the chart. Current Outpatient Medications   Medication Sig Dispense Refill    ticagrelor (BRILINTA) 90 MG TABS tablet Take 90 mg by mouth every 12 hours      nitroGLYCERIN (NITROSTAT) 0.4 MG SL tablet Place 0.4 mg under the tongue as needed      metoprolol tartrate (LOPRESSOR) 25 MG tablet Take 25 mg by mouth every 12 hours      atorvastatin (LIPITOR) 40 MG tablet Take 40 mg by mouth nightly      lisinopril (PRINIVIL;ZESTRIL) 20 MG tablet Take 1 tablet by mouth daily (Patient taking differently: Take 5 mg by mouth daily ) 90 tablet 3    pantoprazole (PROTONIX) 40 MG tablet TAKE 1 TABLET DAILY 90 tablet 3    fenofibrate (TRIGLIDE) 160 MG tablet Take 1 tablet by mouth daily 90 tablet 3    vardenafil (LEVITRA) 20 MG tablet Take 1 tablet by mouth as needed for Erectile Dysfunction 6 tablet 3    aspirin 81 MG tablet Take 81 mg by mouth daily. No current facility-administered medications for this visit. Allergies: Patient has no known allergies.      Past Medical History:   Diagnosis Date    ED (erectile dysfunction)     GERD (gastroesophageal reflux disease)     Heart attack ([facility] [address] 03/14/2016    Hyperlipidemia     Hypertension        Family History   Problem Relation Age of Onset    High Blood Pressure Mother     Diabetes Mother         borderline    Heart Attack Father     Kidney Disease Father     Heart Disease Father     Other Father         adbominal issues    Cancer Sister         Cervical     Heart Attack Paternal Grandmother     Heart Attack Paternal Grandfather        Past Surgical History:   Procedure Laterality Date    AMPUTATION Left     BACK SURGERY      CARDIAC SURGERY      CORONARY ANGIOPLASTY WITH STENT PLACEMENT  2016    SHOULDER SURGERY Right 2018    Dr Glenn Perkins History     Tobacco Use    Smoking status: Former Smoker     Packs/day: 1.00     Years: 25.00     Pack years: 25.00     Types: Cigarettes     Quit date:      Years since quittin.1    Smokeless tobacco: Never Used   Substance Use Topics    Alcohol use: Yes     Comment: rare         Post-Discharge Transitional Care Management Services or Hospital Follow Up      Tsosie Height   YOB: 1965    Date of Office Visit:  2021  Date of Hospital Admission: 2021   Date of Hospital Discharge: 2021    Care management risk score Rising risk (score 2-5) and Complex Care (Scores >=6): 0     Non face to face  following discharge, date last encounter closed (first attempt may have been earlier): *No documented post hospital discharge outreach found in the last 14 days     Call initiated 2 business days of discharge: *No response recorded in the last 14 days    Patient Active Problem List   Diagnosis    Hypertension    Hyperlipidemia    ED (erectile dysfunction)    Coronary artery disease involving native heart without angina pectoris       No Known Allergies    Medications listed as ordered at the time of discharge from hospital  See medication list:   Bystolic was stopped and replaced with Metroprolol tartrate 25 mg twice daily   Triamterene hydrochlorothiazide was stopped.    Lisinopril was decreased from 20 mg to 5 mg daily   Brilinta 90 mg twice daily was added with stent to the LAD   Nitroglycerin as needed was added. Atorvastatin 40 mg nightly was added (he was previously intolerant to statins)    Medications marked \"taking\" at this time  Outpatient Medications Marked as Taking for the 2/9/21 encounter (Office Visit) with ALBERTO Pizano, DO   Medication Sig Dispense Refill    ticagrelor (BRILINTA) 90 MG TABS tablet Take 90 mg by mouth every 12 hours      nitroGLYCERIN (NITROSTAT) 0.4 MG SL tablet Place 0.4 mg under the tongue as needed      metoprolol tartrate (LOPRESSOR) 25 MG tablet Take 25 mg by mouth every 12 hours      atorvastatin (LIPITOR) 40 MG tablet Take 40 mg by mouth nightly      lisinopril (PRINIVIL;ZESTRIL) 20 MG tablet Take 1 tablet by mouth daily (Patient taking differently: Take 5 mg by mouth daily ) 90 tablet 3    pantoprazole (PROTONIX) 40 MG tablet TAKE 1 TABLET DAILY 90 tablet 3    fenofibrate (TRIGLIDE) 160 MG tablet Take 1 tablet by mouth daily 90 tablet 3    vardenafil (LEVITRA) 20 MG tablet Take 1 tablet by mouth as needed for Erectile Dysfunction 6 tablet 3    aspirin 81 MG tablet Take 81 mg by mouth daily. Medications patient taking as of now reconciled against medications ordered at time of hospital discharge: Yes    Chief Complaint   Patient presents with   [address] from [facility] to [facility]/1/[facility] for chest pain, was sent to [facility] were he recieved a heart cath and placed two stints. Discharged 2/2/21, feeling better with no concerns,        HPI    Inpatient course: Discharge summary reviewed- see chart. Patient presented to [facility] emergency department with complaints of chest pain. He was noted to have an ST elevated myocardial infarction and was urgently transferred to [facility].  He was taken to the Cath Lab immediately which showed a proximal % occluded lesion adjacent to her previous stent. He underwent PCI with drug-eluting stent overlapping the previous stent from 2016 resulting in BRAYAN-3 flow post stent placement. He was also noted to have an 80% stenosis in the Proximal segment of the first obtuse marginal and diffuse irregularities in the RCA up to 40% stenotic. He did also have a 2D echo to assess his left ventricular function. This revealed left ventricular hypertrophy as well as multiple hypokinetic segments within the left ventricular ejection fraction of approximately 50%. He was also noted to have grade 1 diastolic dysfunction. He did very well postoperatively and was discharged to home the next day. Interval history/Current status:  Since his discharge from the hospital, he is feeling pretty good. He is generally tired. He is almost back to normal now. He is changing his lifestyle. He is eating better and going to start exercising. He is now feeling nearly back to normal as above. He is aware of the interaction with levitra and nitroglycerine. Vitals:    02/09/21 1407   BP: 118/80   Site: Left Upper Arm   Position: Sitting   Cuff Size: Large Adult   Pulse: 76   Temp: 96.8 °F (36 °C)   TempSrc: Temporal   SpO2: 98%   Weight: 249 lb 8 oz (113.2 kg)   Height: 6' (1.829 m)     Body mass index is 33.84 kg/m². Wt Readings from Last 3 Encounters:   02/09/21 249 lb 8 oz (113.2 kg)   11/05/20 245 lb (111.1 kg)   06/05/19 244 lb 8 oz (110.9 kg)     BP Readings from Last 3 Encounters:   02/09/21 118/80   11/05/20 122/82   06/05/19 128/80       Review of Systems   Constitutional: Negative. Negative for appetite change, chills, diaphoresis, fatigue, fever and unexpected weight change. HENT: Negative. Negative for dental problem (following with dentist regularly), ear pain, hearing loss, postnasal drip, rhinorrhea, sinus pressure, sore throat, tinnitus and trouble swallowing. Eyes: Negative. Negative for visual disturbance (following with regular eye exams).    Respiratory: Negative. Negative for cough, chest tightness, shortness of breath and wheezing. No orthopnea   Cardiovascular: Negative. Negative for chest pain, palpitations and leg swelling. Gastrointestinal: Negative. Negative for abdominal pain, constipation, diarrhea, nausea and vomiting. No melena or hematochezia   Endocrine: Negative. Negative for cold intolerance, heat intolerance, polydipsia and polyuria. Genitourinary: Negative. Negative for difficulty urinating, dysuria and enuresis. Musculoskeletal: Negative. Negative for arthralgias, back pain, joint swelling and myalgias. Skin: Negative. Negative for rash. No new or suspicious skin lesions   Allergic/Immunologic: Negative. Neurological: Negative. Negative for dizziness, tremors, syncope (or presyncope), weakness, light-headedness and headaches. Hematological: Negative. Negative for adenopathy. Does not bruise/bleed easily. Psychiatric/Behavioral: Negative. Physical Exam  Constitutional:       General: He is not in acute distress. Appearance: He is well-developed. He is obese. HENT:      Head: Normocephalic and atraumatic. Right Ear: Tympanic membrane, ear canal and external ear normal.      Left Ear: Tympanic membrane, ear canal and external ear normal.      Nose: Nose normal.      Mouth/Throat:      Mouth: Mucous membranes are moist.      Pharynx: Oropharynx is clear. Eyes:      General: No scleral icterus. Extraocular Movements: Extraocular movements intact. Conjunctiva/sclera: Conjunctivae normal.      Pupils: Pupils are equal, round, and reactive to light. Neck:      Musculoskeletal: Normal range of motion and neck supple. Thyroid: No thyromegaly. Vascular: No carotid bruit or JVD. Cardiovascular:      Rate and Rhythm: Normal rate and regular rhythm. No extrasystoles are present. Chest Wall: PMI is not displaced. Pulses: Normal pulses.       Heart sounds: Normal heart sounds, S1 normal and S2 normal. No murmur. No friction rub. No gallop. Pulmonary:      Effort: Pulmonary effort is normal. No respiratory distress. Breath sounds: Normal breath sounds. No wheezing, rhonchi or rales. Abdominal:      General: Bowel sounds are normal.      Palpations: Abdomen is soft. Tenderness: There is no abdominal tenderness. There is no guarding or rebound. Genitourinary:     Comments: Exam deferred  Musculoskeletal: Normal range of motion. General: No tenderness. Lymphadenopathy:      Cervical: No cervical adenopathy. Skin:     General: Skin is warm and dry. Capillary Refill: Capillary refill takes less than 2 seconds. Findings: No rash. Neurological:      General: No focal deficit present. Mental Status: He is alert and oriented to person, place, and time. Mental status is at baseline. Sensory: No sensory deficit (no numbness or tingling). Psychiatric:         Mood and Affect: Mood normal.         Behavior: Behavior normal.         Thought Content: Thought content normal.         Judgment: Judgment normal.               Assessment/Plan:  1. Hospital discharge follow-up  He is doing well post stent.  - ME DISCHARGE MEDS RECONCILED W/ CURRENT OUTPATIENT MED LIST    2. ST elevation (STEMI) myocardial infarction involving left anterior descending coronary artery ([address]  Doing well and getting back to normal  - ME DISCHARGE MEDS RECONCILED W/ CURRENT OUTPATIENT MED LIST    3. Ischemic cardiomyopathy  With EF most recently at 50% but multiple segments decreased kinesis  - ME DISCHARGE MEDS RECONCILED W/ CURRENT OUTPATIENT MED LIST    4. Essential hypertension  We have backed off on his bp medications for now. We will have him follow bp on ongoing basis  - CBC Auto Differential; Future  - Comprehensive Metabolic Panel; Future  - Microalbumin / Creatinine Urine Ratio; Future    5.  Mixed hyperlipidemia  Will need to recheck lipids in 1 month.  Goal LDL< 70  - Lipid Panel; Future    6. Coronary artery disease involving native coronary artery of native heart without angina pectoris  Need to control RF very well  - CBC Auto Differential; Future  - Comprehensive Metabolic Panel; Future  - Lipid Panel; Future  - Microalbumin / Creatinine Urine Ratio; Future    7. Fatigue, unspecified type  Check thyroid in about 1 months.  - TSH without Reflex; Future        Medical Decision Making: high complexity      This was an in-house visit.

## 2021-02-17 NOTE — TELEPHONE ENCOUNTER
Patient wanted his Lopressor and Lisinopril refilled from the note on 02/01/21 Lisinopril was being held.     Please advise on it.

## 2021-02-18 RX ORDER — LISINOPRIL 5 MG/1
5 TABLET ORAL
Qty: 90 TABLET | Refills: 3 | Status: SHIPPED | OUTPATIENT
Start: 2021-02-18 | End: 2021-02-22 | Stop reason: SDUPTHER

## 2021-02-18 NOTE — TELEPHONE ENCOUNTER
Pt called stating that he will not have enough Metoprolol to last until the mail order gets to him.  He has 3 days left and did not want to run out.  Wants a rx sent to his local for a 30 day supply.  [facility].  Mumtaz Main, CMA

## 2021-02-22 ENCOUNTER — OFFICE VISIT (OUTPATIENT)
Dept: CARDIOLOGY | Facility: CLINIC | Age: 56
End: 2021-02-22

## 2021-02-22 VITALS
SYSTOLIC BLOOD PRESSURE: 122 MMHG | BODY MASS INDEX: 31.7 KG/M2 | WEIGHT: 247 LBS | DIASTOLIC BLOOD PRESSURE: 84 MMHG | HEART RATE: 77 BPM | HEIGHT: 74 IN | OXYGEN SATURATION: 99 %

## 2021-02-22 DIAGNOSIS — I10 ESSENTIAL HYPERTENSION: Chronic | ICD-10-CM

## 2021-02-22 DIAGNOSIS — I21.02 ST ELEVATION MYOCARDIAL INFARCTION INVOLVING LEFT ANTERIOR DESCENDING (LAD) CORONARY ARTERY (HCC): ICD-10-CM

## 2021-02-22 DIAGNOSIS — I25.10 CORONARY ARTERY DISEASE INVOLVING NATIVE CORONARY ARTERY OF NATIVE HEART WITHOUT ANGINA PECTORIS: Primary | Chronic | ICD-10-CM

## 2021-02-22 DIAGNOSIS — E78.5 HYPERLIPIDEMIA LDL GOAL <70: Chronic | ICD-10-CM

## 2021-02-22 DIAGNOSIS — R21 SKIN RASH: ICD-10-CM

## 2021-02-22 DIAGNOSIS — E66.09 CLASS 1 OBESITY DUE TO EXCESS CALORIES WITH SERIOUS COMORBIDITY AND BODY MASS INDEX (BMI) OF 31.0 TO 31.9 IN ADULT: ICD-10-CM

## 2021-02-22 PROBLEM — I21.3 ST ELEVATION MYOCARDIAL INFARCTION (STEMI): Status: RESOLVED | Noted: 2021-02-01 | Resolved: 2021-02-22

## 2021-02-22 PROBLEM — E66.811 CLASS 1 OBESITY DUE TO EXCESS CALORIES WITH SERIOUS COMORBIDITY AND BODY MASS INDEX (BMI) OF 31.0 TO 31.9 IN ADULT: Status: ACTIVE | Noted: 2021-02-22

## 2021-02-22 PROCEDURE — 93000 ELECTROCARDIOGRAM COMPLETE: CPT | Performed by: NURSE PRACTITIONER

## 2021-02-22 PROCEDURE — 99214 OFFICE O/P EST MOD 30 MIN: CPT | Performed by: NURSE PRACTITIONER

## 2021-02-22 RX ORDER — LISINOPRIL 5 MG/1
5 TABLET ORAL
Qty: 90 TABLET | Refills: 3 | Status: SHIPPED | OUTPATIENT
Start: 2021-02-22 | End: 2021-07-20 | Stop reason: DRUGHIGH

## 2021-02-22 NOTE — PATIENT INSTRUCTIONS
Hypertension, Adult  Hypertension is another name for high blood pressure. High blood pressure forces your heart to work harder to pump blood. This can cause problems over time.  There are two numbers in a blood pressure reading. There is a top number (systolic) over a bottom number (diastolic). It is best to have a blood pressure that is below 120/80. Healthy choices can help lower your blood pressure, or you may need medicine to help lower it.  What are the causes?  The cause of this condition is not known. Some conditions may be related to high blood pressure.  What increases the risk?  · Smoking.  · Having type 2 diabetes mellitus, high cholesterol, or both.  · Not getting enough exercise or physical activity.  · Being overweight.  · Having too much fat, sugar, calories, or salt (sodium) in your diet.  · Drinking too much alcohol.  · Having long-term (chronic) kidney disease.  · Having a family history of high blood pressure.  · Age. Risk increases with age.  · Race. You may be at higher risk if you are .  · Gender. Men are at higher risk than women before age 45. After age 65, women are at higher risk than men.  · Having obstructive sleep apnea.  · Stress.  What are the signs or symptoms?  · High blood pressure may not cause symptoms. Very high blood pressure (hypertensive crisis) may cause:  ? Headache.  ? Feelings of worry or nervousness (anxiety).  ? Shortness of breath.  ? Nosebleed.  ? A feeling of being sick to your stomach (nausea).  ? Throwing up (vomiting).  ? Changes in how you see.  ? Very bad chest pain.  ? Seizures.  How is this treated?  · This condition is treated by making healthy lifestyle changes, such as:  ? Eating healthy foods.  ? Exercising more.  ? Drinking less alcohol.  · Your health care provider may prescribe medicine if lifestyle changes are not enough to get your blood pressure under control, and if:  ? Your top number is above 130.  ? Your bottom number is above  80.  · Your personal target blood pressure may vary.  Follow these instructions at home:  Eating and drinking    · If told, follow the DASH eating plan. To follow this plan:  ? Fill one half of your plate at each meal with fruits and vegetables.  ? Fill one fourth of your plate at each meal with whole grains. Whole grains include whole-wheat pasta, brown rice, and whole-grain bread.  ? Eat or drink low-fat dairy products, such as skim milk or low-fat yogurt.  ? Fill one fourth of your plate at each meal with low-fat (lean) proteins. Low-fat proteins include fish, chicken without skin, eggs, beans, and tofu.  ? Avoid fatty meat, cured and processed meat, or chicken with skin.  ? Avoid pre-made or processed food.  · Eat less than 1,500 mg of salt each day.  · Do not drink alcohol if:  ? Your doctor tells you not to drink.  ? You are pregnant, may be pregnant, or are planning to become pregnant.  · If you drink alcohol:  ? Limit how much you use to:  § 0-1 drink a day for women.  § 0-2 drinks a day for men.  ? Be aware of how much alcohol is in your drink. In the [place] one drink equals one 12 oz bottle of beer (355 mL), one 5 oz glass of wine (148 mL), or one 1½ oz glass of hard liquor (44 mL).  Lifestyle    · Work with your doctor to stay at a healthy weight or to lose weight. Ask your doctor what the best weight is for you.  · Get at least 30 minutes of exercise most days of the week. This may include walking, swimming, or biking.  · Get at least 30 minutes of exercise that strengthens your muscles (resistance exercise) at least 3 days a week. This may include lifting weights or doing Pilates.  · Do not use any products that contain nicotine or tobacco, such as cigarettes, e-cigarettes, and chewing tobacco. If you need help quitting, ask your doctor.  · Check your blood pressure at home as told by your doctor.  · Keep all follow-up visits as told by your doctor. This is important.  Medicines  · Take over-the-counter  and prescription medicines only as told by your doctor. Follow directions carefully.  · Do not skip doses of blood pressure medicine. The medicine does not work as well if you skip doses. Skipping doses also puts you at risk for problems.  · Ask your doctor about side effects or reactions to medicines that you should watch for.  Contact a doctor if you:  · Think you are having a reaction to the medicine you are taking.  · Have headaches that keep coming back (recurring).  · Feel dizzy.  · Have swelling in your ankles.  · Have trouble with your vision.  Get help right away if you:  · Get a very bad headache.  · Start to feel mixed up (confused).  · Feel weak or numb.  · Feel faint.  · Have very bad pain in your:  ? Chest.  ? Belly (abdomen).  · Throw up more than once.  · Have trouble breathing.  Summary  · Hypertension is another name for high blood pressure.  · High blood pressure forces your heart to work harder to pump blood.  · For most people, a normal blood pressure is less than 120/80.  · Making healthy choices can help lower blood pressure. If your blood pressure does not get lower with healthy choices, you may need to take medicine.  This information is not intended to replace advice given to you by your health care provider. Make sure you discuss any questions you have with your health care provider.  Document Revised: 08/28/2019 Document Reviewed: 08/28/2019  [employer] Patient Education © 2020 [employer]  Coronary Artery Disease, Male  Coronary artery disease (CAD) is a condition in which the arteries that lead to the heart (coronary arteries) become narrow or blocked. The narrowing or blockage can lead to decreased blood flow to the heart. Prolonged reduced blood flow can cause a heart attack (myocardial infarction or MI). This condition may also be called coronary heart disease.  Because CAD is the leading cause of death in men, it is important to understand what causes this condition and how it is  treated.  What are the causes?  CAD is most often caused by atherosclerosis. This is the buildup of fat and cholesterol (plaque) on the inside of the arteries. Over time, the plaque may narrow or block the artery, reducing blood flow to the heart. Plaque can also become weak and break off within a coronary artery and cause a sudden blockage. Other less common causes of CAD include:  · A blood clot or a piece of a blood clot or other substance that blocks the flow of blood in a coronary artery (embolism).  · A tearing of the artery (spontaneous coronary artery dissection).  · An enlargement of an artery (aneurysm).  · Inflammation (vasculitis) in the artery wall.  What increases the risk?  The following factors may make you more likely to develop this condition:  · Age. Men over age 45 are at a greater risk of CAD.  · Family history of CAD.  · Gender. Men often develop CAD earlier in life than women.  · High blood pressure (hypertension).  · Diabetes.  · High cholesterol levels.  · Tobacco use.  · Excessive alcohol use.  · Lack of exercise.  · A diet high in saturated and trans fats, such as fried food and processed meat.  Other possible risk factors include:  · High stress levels.  · Depression.  · Obesity.  · Sleep apnea.  What are the signs or symptoms?  Many people do not have any symptoms during the early stages of CAD. As the condition progresses, symptoms may include:  · Chest pain (angina). The pain can:  ? Feel like crushing or squeezing, or like a tightness, pressure, fullness, or heaviness in the chest.  ? Last more than a few minutes or can stop and recur. The pain tends to get worse with exercise or stress and to fade with rest.  · Pain in the arms, neck, jaw, ear, or back.  · Unexplained heartburn or indigestion.  · Shortness of breath.  · Nausea or vomiting.  · Sudden light-headedness.  · Sudden cold sweats.  · Fluttering or fast heartbeat (palpitations).  How is this diagnosed?  This condition is  diagnosed based on:  · Your family and medical history.  · A physical exam.  · Tests, including:  ? A test to check the electrical signals in your heart (electrocardiogram).  ? Exercise stress test. This looks for signs of blockage when the heart is stressed with exercise, such as running on a treadmill.  ? Pharmacologic stress test. This test looks for signs of blockage when the heart is being stressed with a medicine.  ? Blood tests.  ? Coronary angiogram. This is a procedure to look at the coronary arteries to see if there is any blockage. During this test, a dye is injected into your arteries so they appear on an X-ray.  ? Coronary artery CT scan. This CT scan helps detect calcium deposits in your coronary arteries. Calcium deposits are an indicator of CAD.  ? A test that uses sound waves to take a picture of your heart (echocardiogram).  ? Chest X-ray.  How is this treated?  This condition may be treated by:  · Healthy lifestyle changes to reduce risk factors.  · Medicines such as:  ? Antiplatelet medicines and blood-thinning medicines, such as aspirin. These help to prevent blood clots.  ? Nitroglycerin.  ? Blood pressure medicines.  ? Cholesterol-lowering medicine.  · Coronary angioplasty and stenting. During this procedure, a thin, flexible tube is inserted through a blood vessel and into a blocked artery. A balloon or similar device on the end of the tube is inflated to open up the artery. In some cases, a small, mesh tube (stent) is inserted into the artery to keep it open.  · Coronary artery bypass surgery. During this surgery, veins or arteries from other parts of the body are used to create a bypass around the blockage and allow blood to reach your heart.  Follow these instructions at home:  Medicines  · Take over-the-counter and prescription medicines only as told by your health care provider.  · Do not take the following medicines unless your health care provider approves:  ? NSAIDs, such as  ibuprofen, naproxen, or celecoxib.  ? Vitamin supplements that contain vitamin A, vitamin E, or both.  Lifestyle  · Follow an exercise program approved by your health care provider. Aim for 150 minutes of moderate exercise or 75 minutes of vigorous exercise each week.  · Maintain a healthy weight or lose weight as approved by your health care provider.  · Learn to manage stress or try to limit your stress. Ask your health care provider for suggestions if you need help.  · Get screened for depression and seek treatment, if needed.  · Do not use any products that contain nicotine or tobacco, such as cigarettes, e-cigarettes, and chewing tobacco. If you need help quitting, ask your health care provider.  · Do not use illegal drugs.  Eating and drinking    · Follow a heart-healthy diet. A dietitian can help educate you about healthy food options and changes. In general, eat plenty of fruits and vegetables, lean meats, and whole grains.  · Avoid foods high in:  ? Sugar.  ? Salt (sodium).  ? Saturated fat, such as processed or fatty meat.  ? Trans fat, such as fried foods.  · Use healthy cooking methods such as roasting, grilling, broiling, baking, poaching, steaming, or stir-frying.  · Do not drink alcohol if your health care provider tells you not to drink.  · If you drink alcohol:  ? Limit how much you have to 0-2 drinks per day.  ? Be aware of how much alcohol is in your drink. In the [place] one drink equals one 12 oz bottle of beer (355 mL), one 5 oz glass of wine (148 mL), or one 1½ oz glass of hard liquor (44 mL).  General instructions  · Manage any other health conditions, such as hypertension and diabetes. These conditions affect your heart.  · Your health care provider may ask you to monitor your blood pressure. Ideally, your blood pressure should be below 130/80.  · Keep all follow-up visits as told by your health care provider. This is important.  Get help right away if:  · You have pain in your chest, neck,  ear, arm, jaw, stomach, or back that:  ? Lasts more than a few minutes.  ? Is recurring.  ? Is not relieved by taking medicine under your tongue (sublingual nitroglycerin).  · You have profuse sweating without cause.  · You have unexplained:  ? Heartburn or indigestion.  ? Shortness of breath or difficulty breathing.  ? Fluttering or fast heartbeat (palpitations).  ? Nausea or vomiting.  ? Fatigue.  ? Feelings of nervousness or anxiety.  ? Weakness.  ? Diarrhea.  · You have sudden light-headedness or dizziness.  · You faint.  · You feel like hurting yourself or think about taking your own life.  These symptoms may represent a serious problem that is an emergency. Do not wait to see if the symptoms will go away. Get medical help right away. Call your local emergency services (911 in the [place] Do not drive yourself to the hospital.  Summary  · Coronary artery disease (CAD) is a condition in which the arteries that lead to the heart (coronary arteries) become narrow or blocked. The narrowing or blockage can lead to a heart attack.  · Many people do not have any symptoms during the early stages of CAD.  · CAD can be treated with lifestyle changes, medicines, surgery, or a combination of these treatments.  This information is not intended to replace advice given to you by your health care provider. Make sure you discuss any questions you have with your health care provider.  Document Revised: 09/06/2019 Document Reviewed: 08/27/2019  [email or web address] Patient Education © 2020 [employer]

## 2021-02-22 NOTE — PROGRESS NOTES
Subjective:     Encounter Date:02/22/2021      Patient ID: Eduardo Altman is a 55 y.o. male with a previous medical history of coronary artery disease, hypertension, hyperlipidemia.  He was recently hospitalized with ST elevation myocardial infarction.  He presents the office today for discharge follow-up.     Chief Complaint: Discharge Follow Up  Coronary Artery Disease  Presents for follow-up visit. Pertinent negatives include no chest pain, chest pressure, chest tightness, dizziness, leg swelling, palpitations or shortness of breath. Risk factors include hyperlipidemia. The symptoms have been stable. Compliance with diet is good. Compliance with exercise is good. Compliance with medications is good.   Hypertension  This is a chronic problem. The current episode started more than 1 year ago. The problem is controlled. Pertinent negatives include no chest pain, headaches, malaise/fatigue, orthopnea, palpitations, peripheral edema, PND or shortness of breath. Risk factors for coronary artery disease include dyslipidemia and male gender. Current antihypertension treatment includes ACE inhibitors and beta blockers. The current treatment provides significant improvement. There are no compliance problems.  Hypertensive end-organ damage includes CAD/MI. There is no history of angina.   Hyperlipidemia  This is a chronic problem. The current episode started more than 1 year ago. The problem is uncontrolled. Recent lipid tests were reviewed and are high. Pertinent negatives include no chest pain, focal weakness or shortness of breath. Current antihyperlipidemic treatment includes statins. Compliance problems include medication side effects (? rash).  Risk factors for coronary artery disease include dyslipidemia, male sex and hypertension.     Mr. Altman presents the office today for discharge follow-up.  The patient was transferred from an outside facility to [facility] on 2/1/2021 with complaints of chest discomfort,  abnormal EKG with ST elevation with active chest pain.  He was taken to the cardiac catheterization lab by Dr. Jeffrey where he was found to have a total occlusion of the left anterior descending artery just proximal to a previously placed stent.  It was also noted that a very proximal diagonal branch appeared to be occluded as well.  The patient had successful percutaneous coronary intervention with drug-eluting stent placement to the LAD.      Since his discharge he has been feeling well without recurrence of chest pain, chest pressure, chest discomfort.  He is compliant on Brilinta and aspirin.  He denies any shortness of breath. He has been walking daily about 30 minutes per day, tolerating this activity well.  His only complaint is a rash he has noted on his hands and arms.  He denies any known triggers that have caused the rash.  He reports that it has improved some using topical treatment that he is used in the past for poison ivy.  He      The following portions of the patient's history were reviewed and updated as appropriate: allergies, current medications, past family history, past medical history, past social history and past surgical history.     No Known Allergies      Current Outpatient Medications:   •  aspirin 81 MG chewable tablet, Chew 81 mg Daily., Disp: , Rfl:   •  fenofibrate 160 MG tablet, Take 160 mg by mouth Daily., Disp: , Rfl:   •  lisinopril (PRINIVIL,ZESTRIL) 5 MG tablet, Take 1 tablet by mouth Daily., Disp: 90 tablet, Rfl: 3  •  metoprolol tartrate (LOPRESSOR) 25 MG tablet, Take 1 tablet by mouth Every 12 (Twelve) Hours., Disp: 180 tablet, Rfl: 3  •  nitroglycerin (NITROSTAT) 0.4 MG SL tablet, Place 1 tablet under the tongue Every 5 (Five) Minutes As Needed for Chest Pain. Take no more than 3 doses in 15 minutes., Disp: 25 tablet, Rfl: 1  •  pantoprazole (PROTONIX) 40 MG EC tablet, Take 40 mg by mouth Daily., Disp: , Rfl:   •  ticagrelor (BRILINTA) 90 MG tablet tablet, Take 1 tablet by  "mouth 2 (Two) Times a Day., Disp: 180 tablet, Rfl: 3    Review of Systems   Constitution: Negative for fever and malaise/fatigue.   HENT: Negative for nosebleeds.    Cardiovascular: Negative for chest pain, dyspnea on exertion, irregular heartbeat, leg swelling, near-syncope, orthopnea, palpitations, paroxysmal nocturnal dyspnea and syncope.   Respiratory: Negative for chest tightness, cough, shortness of breath, sputum production and wheezing.    Hematologic/Lymphatic: Negative for bleeding problem. Does not bruise/bleed easily.   Gastrointestinal: Negative for bloating, abdominal pain, nausea and vomiting.   Neurological: Negative for dizziness, focal weakness, headaches, light-headedness and weakness.   Psychiatric/Behavioral: Negative for altered mental status.         ECG 12 Lead    Date/Time: 2/22/2021 3:28 PM  Performed by: Pamela Maxwell APRN  Authorized by: Pamela Maxwell APRN   Comparison: compared with previous ECG from 2/1/2021  Similar to previous ECG  Rhythm: sinus rhythm  Rate: normal  BPM: 77  Conduction: conduction normal  ST Segments: ST segments normal  T inversion: I, aVL, V2, V3, V4, V5 and V6  Other findings: T wave abnormality    Clinical impression: abnormal EKG          /84   Pulse 77   Ht 188 cm (74\")   Wt 112 kg (247 lb)   SpO2 99%   BMI 31.71 kg/m²        Objective:     Vitals signs reviewed.   Constitutional:       General: Not in acute distress.     Appearance: Well-developed. Not diaphoretic.   Eyes:      General:         Right eye: No discharge.         Left eye: No discharge.   HENT:      Head: Normocephalic and atraumatic.    Mouth/Throat:      Pharynx: No oropharyngeal exudate.   Neck:      Musculoskeletal: Normal range of motion and neck supple.   Pulmonary:      Effort: Pulmonary effort is normal. No respiratory distress.      Breath sounds: Normal breath sounds. No wheezing. No rhonchi. No rales.   Chest:      Chest wall: Not tender to palpatation. "   Cardiovascular:      Normal rate. Regular rhythm.      Murmurs: There is no murmur.      No gallop. No rub.   Edema:     Peripheral edema absent.   Abdominal:      General: There is no distension.      Palpations: Abdomen is soft.      Tenderness: There is no abdominal tenderness.   Musculoskeletal: Normal range of motion.   Skin:     General: Skin is warm and dry.      Coloration: Skin is not pale.      Findings: No erythema or rash.   Neurological:      Mental Status: Alert, oriented to person, place, and time and oriented to person, place and time.   Psychiatric:         Mood and Affect: Mood normal.         Speech: Speech normal.         Behavior: Behavior normal. Behavior is cooperative.         Thought Content: Thought content normal.         Cognition and Memory: Cognition normal.         Judgment: Judgment normal.       Lab Review:   Results for orders placed during the hospital encounter of 02/01/21   Adult Transthoracic Echo Complete W/ Cont if Necessary Per Protocol    Narrative · Left ventricular wall thickness is consistent with concentric   hypertrophy.  · The following left ventricular wall segments are hypokinetic: apical   lateral, apical septal and apex hypokinetic.  · Estimated left ventricular EF = 50% Left ventricular systolic function   is normal.  · Left ventricular diastolic function is consistent with (grade I)   impaired relaxation.        Cardiac catheterization 2/1/2021  Impression:  1.  Acute anterior STEMI, secondary to proximal LAD occlusion, just proximal to previously placed stent.  2.  Successful PCI to the proximal mid left anterior descending artery as outlined above.  3.  Severe stenosis in the first obtuse marginal branch, left to be medically managed at this time.      Assessment:          Diagnosis Plan   1. Coronary artery disease involving native coronary artery of native heart without angina pectoris     2. ST elevation myocardial infarction involving left anterior  descending (LAD) coronary artery (CMS/[facility])     3. Essential hypertension     4. Hyperlipidemia LDL goal <70     5. Skin rash     6. Class 1 obesity due to excess calories with serious comorbidity and body mass index (BMI) of 31.0 to 31.9 in adult            Plan:       -Coronary artery disease: Stable without symptoms.  Previous history of PCI to LAD.  Recent ST elevation myocardial infarction with hospitalization this month, February, requiring PCI to the LAD with stent placement.  On dual antiplatelet therapy with aspirin and Brilinta.  He is on low-dose beta-blocker therapy and low-dose ACE inhibitor.  He was placed on statin therapy at discharge (see details below).    -ST elevation myocardial infarction: Recent anterior STEMI February 2021 with PCI per Dr. Jeffrey.  Xience Miguelina drug-eluting stent 3.0 x 23 mm Xience Miguelina drug-eluting stent 2.75 x 23 mm, both to the LAD.    -Hypertension: Well-controlled on current medications.    -Hyperlipidemia: Was started on statin therapy at discharge.  He reports a rash to his bilateral upper extremities that he has noticed in the past several days.  He has been on statin medication in about 2 weeks.  We will hold this to see if rash significantly improved and is a side effect of this medication.    - Rash: Skin rash noted on his bilateral upper extremities and hands.  We will hold his statin therapy to see if this is a side effect from this medication which was started at discharge.    -Obesity: BMI 31.71.  Diet and exercise recommended.  The patient is currently walking daily approximately 30 minutes.    Follow up in 4 weeks with video visit to discuss rash.  Okay to return to work.

## 2021-03-22 ENCOUNTER — TELEMEDICINE (OUTPATIENT)
Dept: CARDIOLOGY | Facility: CLINIC | Age: 56
End: 2021-03-22

## 2021-03-22 VITALS — HEIGHT: 74 IN | WEIGHT: 240 LBS | BODY MASS INDEX: 30.8 KG/M2

## 2021-03-22 DIAGNOSIS — R21 SKIN RASH: Primary | ICD-10-CM

## 2021-03-22 DIAGNOSIS — E78.5 HYPERLIPIDEMIA LDL GOAL <70: Chronic | ICD-10-CM

## 2021-03-22 DIAGNOSIS — I25.10 CORONARY ARTERY DISEASE INVOLVING NATIVE CORONARY ARTERY OF NATIVE HEART WITHOUT ANGINA PECTORIS: Chronic | ICD-10-CM

## 2021-03-22 PROCEDURE — 99214 OFFICE O/P EST MOD 30 MIN: CPT | Performed by: NURSE PRACTITIONER

## 2021-03-22 NOTE — PROGRESS NOTES
Subjective:     Encounter Date:03/22/2021      Patient ID: Eduardo Altman is a 55 y.o. male with a previous medical history of coronary artery disease with ST elevation myocardial infarction in February , hypertension, hyperlipidemia.  He has a follow up today after recent complaints of a rash and med changes.     You have chosen to receive care through a telehealth visit.  Do you consent to use a video/audio connection for your medical care today? Yes I spoke with the patient for a total of 9 minutes today via video chat.     Chief Complaint: Rash follow-up  History of Present Illness    Mr. Altman was admitted with ST elevation MI and was discharged home on statin therapy. At office follow up he noted a rash on his arms and hands, bilaterally.  Otherwise, he had no complaints. He continues to report feeling well. Denies chest pain, pressure, or similar. He denies any shortness of breath or dyspnea on exertion. He has stopped the atorvastatin and had resolution of his rash in 3-4 days afterwards. He has had no further issues since that time.       The following portions of the patient's history were reviewed and updated as appropriate: allergies, current medications, past family history, past medical history, past social history and past surgical history.     No Known Allergies      Current Outpatient Medications:   •  aspirin 81 MG chewable tablet, Chew 81 mg Daily., Disp: , Rfl:   •  fenofibrate 160 MG tablet, Take 160 mg by mouth Daily., Disp: , Rfl:   •  lisinopril (PRINIVIL,ZESTRIL) 5 MG tablet, Take 1 tablet by mouth Daily., Disp: 90 tablet, Rfl: 3  •  metoprolol tartrate (LOPRESSOR) 25 MG tablet, Take 1 tablet by mouth Every 12 (Twelve) Hours., Disp: 180 tablet, Rfl: 3  •  nitroglycerin (NITROSTAT) 0.4 MG SL tablet, Place 1 tablet under the tongue Every 5 (Five) Minutes As Needed for Chest Pain. Take no more than 3 doses in 15 minutes., Disp: 25 tablet, Rfl: 1  •  pantoprazole (PROTONIX) 40 MG EC tablet,  "Take 40 mg by mouth Daily., Disp: , Rfl:   •  ticagrelor (BRILINTA) 90 MG tablet tablet, Take 1 tablet by mouth 2 (Two) Times a Day., Disp: 180 tablet, Rfl: 3    Review of Systems   Constitutional: Negative for malaise/fatigue.   HENT: Negative for nosebleeds.    Cardiovascular: Negative for chest pain and dyspnea on exertion.   Respiratory: Negative for shortness of breath.    Hematologic/Lymphatic: Negative for bleeding problem. Does not bruise/bleed easily.   Skin: Negative for color change, itching and rash.   Gastrointestinal: Negative for bloating and abdominal pain.   Neurological: Negative for light-headedness and weakness.   Psychiatric/Behavioral: Negative for altered mental status.       Procedures  Ht 188 cm (74\")   Wt 109 kg (240 lb)   BMI 30.81 kg/m²        Objective:     Constitutional:       Appearance: Normal and healthy appearance. Well-developed, well-groomed and not in distress.   HENT:      Head: Normocephalic and atraumatic.   Pulmonary:      Effort: Pulmonary effort is normal.   Musculoskeletal: Normal range of motion.      Cervical back: Normal range of motion and neck supple. Skin:     Findings: No rash.   Neurological:      Mental Status: Alert and oriented to person, place and time.   Psychiatric:         Attention and Perception: Attention normal.         Mood and Affect: Mood normal.         Speech: Speech normal.         Behavior: Behavior normal. Behavior is cooperative.         Cognition and Memory: Cognition and memory normal.         Judgment: Judgment normal.       Lab Review:   Lab Results   Component Value Date    CHOL 129 02/01/2021    CHLPL 166 06/03/2019    TRIG 339 (H) 02/01/2021    HDL 19 (L) 02/01/2021    LDL 57 02/01/2021           Assessment:          Diagnosis Plan   1. Skin rash     2. Hyperlipidemia LDL goal <70     3. Coronary artery disease involving native coronary artery of native heart without angina pectoris            Plan:       -Skin rash: Skin rash was " noted on his bilateral upper extremities and hands after starting statin therapy.  He held this medication after his previous office visit and reports that the rash improved within 3 to 4 days.  He was previously taking atorvastatin 40 mg daily.  We will retry statin therapy with a different statin, rosuvastatin.  He is to notify our office if he has sensitivity to this medication.    -LDL goal less than 70: He was started on statin therapy at his recent discharge due to his history of coronary artery disease.  Previous rash (as discussed above) with atorvastatin.  Rosuvastatin 20 mg daily prescribed today. If he cannot tolerate crestor, would consider [id number] therapy for lipid managment.     -Coronary artery disease: Stable without symptoms.  Previous history of PCI to LAD.  Now with ST elevation myocardial infarction with hospitalization in February 2021, requiring PCI to the LAD with stent placement.  On dual antiplatelet therapy with aspirin and Brilinta.  He is on low-dose beta-blocker therapy and low-dose ACE inhibitor.     Follow up in 6 months for routine visit. Call our office sooner with side effects to new statin prescribed today.

## 2021-07-18 ENCOUNTER — NURSE TRIAGE (OUTPATIENT)
Dept: CALL CENTER | Facility: HOSPITAL | Age: 56
End: 2021-07-18

## 2021-07-18 NOTE — TELEPHONE ENCOUNTER
"Stents Feb 2021    165/110 with H/A yesterday   No headache today    Reason for Disposition  • Systolic BP  >= 180 OR Diastolic >= 110    Additional Information  • Negative: Difficult to awaken or acting confused (e.g., disoriented, slurred speech)  • Negative: SEVERE difficulty breathing (e.g., struggling for each breath, speaks in single words)  • Negative: [1] Weakness of the face, arm or leg on one side of the body AND [2] new-onset  • Negative: [1] Numbness (i.e., loss of sensation) of the face, arm or leg on one side of the body AND [2] new-onset  • Negative: [1] Chest pain lasts > 5 minutes AND [2] history of heart disease  (i.e., heart attack, bypass surgery, angina, angioplasty, CHF)  • Negative: [1] Chest pain AND [2] took nitrogylcerin AND [3] pain was not relieved  • Negative: Sounds like a life-threatening emergency to the triager  • Negative: Symptom is main concern  (e.g., headache, chest pain)  • Negative: Low blood pressure is main concern  • Negative: [1] Systolic BP  >= 160 OR Diastolic >= 100 AND [2] cardiac or neurologic symptoms (e.g., chest pain, difficulty breathing, unsteady gait, blurred vision)  • Negative: [1] Pregnant 20 or more weeks (or postpartum < 6 weeks) AND [2] new hand or face swelling  • Negative: [1] Pregnant 20 or more weeks AND [2] Systolic BP  >= 140 OR Diastolic >= 90  • Negative: [1] Systolic BP  >= 200 OR Diastolic >= 120  AND [2] having NO cardiac or neurologic symptoms  • Negative: [1] Postpartum < 6 weeks AND [2] Systolic BP  >= 140 OR Diastolic >= 90  • Negative: [1] Systolic BP  >= 180 OR Diastolic >= 110 AND [2] missed most recent dose of blood pressure medication    Answer Assessment - Initial Assessment Questions  1. BLOOD PRESSURE: \"What is the blood pressure?\" \"Did you take at least two measurements 5 minutes apart?\"     165/110 this am        161/97 15 minutes prior to call    173/106 HR 78 during call.  2. ONSET: \"When did you take your blood pressure?\"   last " "reading during call  3. HOW: \"How did you obtain the blood pressure?\" (e.g., visiting nurse, automatic home BP monitor)  automatic  4. HISTORY: \"Do you have a history of high blood pressure?\"  yes  5. MEDICATIONS: \"Are you taking any medications for blood pressure?\" \"Have you missed any doses recently?\"    Metroprolol Lisinopril    Maybe missed Metroprolpl on Friday night.  6. OTHER SYMPTOMS: \"Do you have any symptoms?\" (e.g., headache, chest pain, blurred vision, difficulty breathing, weakness)   mild headache   fatigue  7. PREGNANCY: \"Is there any chance you are pregnant?\" \"When was your last menstrual period?\"  na    Protocols used: BLOOD PRESSURE - HIGH-ADULT-AH      "

## 2021-07-20 ENCOUNTER — OFFICE VISIT (OUTPATIENT)
Dept: CARDIOLOGY | Facility: CLINIC | Age: 56
End: 2021-07-20

## 2021-07-20 VITALS
RESPIRATION RATE: 18 BRPM | SYSTOLIC BLOOD PRESSURE: 148 MMHG | OXYGEN SATURATION: 97 % | DIASTOLIC BLOOD PRESSURE: 90 MMHG | HEART RATE: 66 BPM | HEIGHT: 74 IN | BODY MASS INDEX: 32.47 KG/M2 | WEIGHT: 253 LBS

## 2021-07-20 DIAGNOSIS — I10 ESSENTIAL HYPERTENSION: Primary | Chronic | ICD-10-CM

## 2021-07-20 DIAGNOSIS — E78.5 HYPERLIPIDEMIA LDL GOAL <70: Chronic | ICD-10-CM

## 2021-07-20 DIAGNOSIS — I21.02 ST ELEVATION MYOCARDIAL INFARCTION INVOLVING LEFT ANTERIOR DESCENDING (LAD) CORONARY ARTERY (HCC): ICD-10-CM

## 2021-07-20 DIAGNOSIS — E66.09 CLASS 1 OBESITY DUE TO EXCESS CALORIES WITH SERIOUS COMORBIDITY AND BODY MASS INDEX (BMI) OF 32.0 TO 32.9 IN ADULT: ICD-10-CM

## 2021-07-20 DIAGNOSIS — I25.10 CORONARY ARTERY DISEASE INVOLVING NATIVE CORONARY ARTERY OF NATIVE HEART WITHOUT ANGINA PECTORIS: Chronic | ICD-10-CM

## 2021-07-20 PROCEDURE — 99214 OFFICE O/P EST MOD 30 MIN: CPT | Performed by: NURSE PRACTITIONER

## 2021-07-20 PROCEDURE — 93000 ELECTROCARDIOGRAM COMPLETE: CPT | Performed by: NURSE PRACTITIONER

## 2021-07-20 RX ORDER — LISINOPRIL 20 MG/1
20 TABLET ORAL DAILY
Qty: 30 TABLET | Refills: 1 | Status: SHIPPED | OUTPATIENT
Start: 2021-07-20 | End: 2021-08-11

## 2021-07-20 RX ORDER — ROSUVASTATIN CALCIUM 10 MG/1
10 TABLET, COATED ORAL DAILY
Qty: 30 TABLET | Refills: 11 | Status: SHIPPED | OUTPATIENT
Start: 2021-07-20 | End: 2022-07-14

## 2021-07-20 NOTE — PATIENT INSTRUCTIONS
Hypertension, Adult  Hypertension is another name for high blood pressure. High blood pressure forces your heart to work harder to pump blood. This can cause problems over time.  There are two numbers in a blood pressure reading. There is a top number (systolic) over a bottom number (diastolic). It is best to have a blood pressure that is below 120/80. Healthy choices can help lower your blood pressure, or you may need medicine to help lower it.  What are the causes?  The cause of this condition is not known. Some conditions may be related to high blood pressure.  What increases the risk?  · Smoking.  · Having type 2 diabetes mellitus, high cholesterol, or both.  · Not getting enough exercise or physical activity.  · Being overweight.  · Having too much fat, sugar, calories, or salt (sodium) in your diet.  · Drinking too much alcohol.  · Having long-term (chronic) kidney disease.  · Having a family history of high blood pressure.  · Age. Risk increases with age.  · Race. You may be at higher risk if you are .  · Gender. Men are at higher risk than women before age 45. After age 65, women are at higher risk than men.  · Having obstructive sleep apnea.  · Stress.  What are the signs or symptoms?  · High blood pressure may not cause symptoms. Very high blood pressure (hypertensive crisis) may cause:  ? Headache.  ? Feelings of worry or nervousness (anxiety).  ? Shortness of breath.  ? Nosebleed.  ? A feeling of being sick to your stomach (nausea).  ? Throwing up (vomiting).  ? Changes in how you see.  ? Very bad chest pain.  ? Seizures.  How is this treated?  · This condition is treated by making healthy lifestyle changes, such as:  ? Eating healthy foods.  ? Exercising more.  ? Drinking less alcohol.  · Your health care provider may prescribe medicine if lifestyle changes are not enough to get your blood pressure under control, and if:  ? Your top number is above 130.  ? Your bottom number is above  80.  · Your personal target blood pressure may vary.  Follow these instructions at home:  Eating and drinking    · If told, follow the DASH eating plan. To follow this plan:  ? Fill one half of your plate at each meal with fruits and vegetables.  ? Fill one fourth of your plate at each meal with whole grains. Whole grains include whole-wheat pasta, brown rice, and whole-grain bread.  ? Eat or drink low-fat dairy products, such as skim milk or low-fat yogurt.  ? Fill one fourth of your plate at each meal with low-fat (lean) proteins. Low-fat proteins include fish, chicken without skin, eggs, beans, and tofu.  ? Avoid fatty meat, cured and processed meat, or chicken with skin.  ? Avoid pre-made or processed food.  · Eat less than 1,500 mg of salt each day.  · Do not drink alcohol if:  ? Your doctor tells you not to drink.  ? You are pregnant, may be pregnant, or are planning to become pregnant.  · If you drink alcohol:  ? Limit how much you use to:  § 0-1 drink a day for women.  § 0-2 drinks a day for men.  ? Be aware of how much alcohol is in your drink. In the [place] one drink equals one 12 oz bottle of beer (355 mL), one 5 oz glass of wine (148 mL), or one 1½ oz glass of hard liquor (44 mL).  Lifestyle    · Work with your doctor to stay at a healthy weight or to lose weight. Ask your doctor what the best weight is for you.  · Get at least 30 minutes of exercise most days of the week. This may include walking, swimming, or biking.  · Get at least 30 minutes of exercise that strengthens your muscles (resistance exercise) at least 3 days a week. This may include lifting weights or doing Pilates.  · Do not use any products that contain nicotine or tobacco, such as cigarettes, e-cigarettes, and chewing tobacco. If you need help quitting, ask your doctor.  · Check your blood pressure at home as told by your doctor.  · Keep all follow-up visits as told by your doctor. This is important.  Medicines  · Take over-the-counter  and prescription medicines only as told by your doctor. Follow directions carefully.  · Do not skip doses of blood pressure medicine. The medicine does not work as well if you skip doses. Skipping doses also puts you at risk for problems.  · Ask your doctor about side effects or reactions to medicines that you should watch for.  Contact a doctor if you:  · Think you are having a reaction to the medicine you are taking.  · Have headaches that keep coming back (recurring).  · Feel dizzy.  · Have swelling in your ankles.  · Have trouble with your vision.  Get help right away if you:  · Get a very bad headache.  · Start to feel mixed up (confused).  · Feel weak or numb.  · Feel faint.  · Have very bad pain in your:  ? Chest.  ? Belly (abdomen).  · Throw up more than once.  · Have trouble breathing.  Summary  · Hypertension is another name for high blood pressure.  · High blood pressure forces your heart to work harder to pump blood.  · For most people, a normal blood pressure is less than 120/80.  · Making healthy choices can help lower blood pressure. If your blood pressure does not get lower with healthy choices, you may need to take medicine.  This information is not intended to replace advice given to you by your health care provider. Make sure you discuss any questions you have with your health care provider.  Document Revised: 08/28/2019 Document Reviewed: 08/28/2019  [employer] Patient Education © 2021 [employer]  Coronary Artery Disease, Male  Coronary artery disease (CAD) is a condition in which the arteries that lead to the heart (coronary arteries) become narrow or blocked. The narrowing or blockage can lead to decreased blood flow to the heart. Prolonged reduced blood flow can cause a heart attack (myocardial infarction or MI). This condition may also be called coronary heart disease.  Because CAD is the leading cause of death in men, it is important to understand what causes this condition and how it is  treated.  What are the causes?  CAD is most often caused by atherosclerosis. This is the buildup of fat and cholesterol (plaque) on the inside of the arteries. Over time, the plaque may narrow or block the artery, reducing blood flow to the heart. Plaque can also become weak and break off within a coronary artery and cause a sudden blockage. Other less common causes of CAD include:  · A blood clot or a piece of a blood clot or other substance that blocks the flow of blood in a coronary artery (embolism).  · A tearing of the artery (spontaneous coronary artery dissection).  · An enlargement of an artery (aneurysm).  · Inflammation (vasculitis) in the artery wall.  What increases the risk?  The following factors may make you more likely to develop this condition:  · Age. Men over age 45 are at a greater risk of CAD.  · Family history of CAD.  · Gender. Men often develop CAD earlier in life than women.  · High blood pressure (hypertension).  · Diabetes.  · High cholesterol levels.  · Tobacco use.  · Excessive alcohol use.  · Lack of exercise.  · A diet high in saturated and trans fats, such as fried food and processed meat.  Other possible risk factors include:  · High stress levels.  · Depression.  · Obesity.  · Sleep apnea.  What are the signs or symptoms?  Many people do not have any symptoms during the early stages of CAD. As the condition progresses, symptoms may include:  · Chest pain (angina). The pain can:  ? Feel like crushing or squeezing, or like a tightness, pressure, fullness, or heaviness in the chest.  ? Last more than a few minutes or can stop and recur. The pain tends to get worse with exercise or stress and to fade with rest.  · Pain in the arms, neck, jaw, ear, or back.  · Unexplained heartburn or indigestion.  · Shortness of breath.  · Nausea or vomiting.  · Sudden light-headedness.  · Sudden cold sweats.  · Fluttering or fast heartbeat (palpitations).  How is this diagnosed?  This condition is  diagnosed based on:  · Your family and medical history.  · A physical exam.  · Tests, including:  ? A test to check the electrical signals in your heart (electrocardiogram).  ? Exercise stress test. This looks for signs of blockage when the heart is stressed with exercise, such as running on a treadmill.  ? Pharmacologic stress test. This test looks for signs of blockage when the heart is being stressed with a medicine.  ? Blood tests.  ? Coronary angiogram. This is a procedure to look at the coronary arteries to see if there is any blockage. During this test, a dye is injected into your arteries so they appear on an X-ray.  ? Coronary artery CT scan. This CT scan helps detect calcium deposits in your coronary arteries. Calcium deposits are an indicator of CAD.  ? A test that uses sound waves to take a picture of your heart (echocardiogram).  ? Chest X-ray.  How is this treated?  This condition may be treated by:  · Healthy lifestyle changes to reduce risk factors.  · Medicines such as:  ? Antiplatelet medicines and blood-thinning medicines, such as aspirin. These help to prevent blood clots.  ? Nitroglycerin.  ? Blood pressure medicines.  ? Cholesterol-lowering medicine.  · Coronary angioplasty and stenting. During this procedure, a thin, flexible tube is inserted through a blood vessel and into a blocked artery. A balloon or similar device on the end of the tube is inflated to open up the artery. In some cases, a small, mesh tube (stent) is inserted into the artery to keep it open.  · Coronary artery bypass surgery. During this surgery, veins or arteries from other parts of the body are used to create a bypass around the blockage and allow blood to reach your heart.  Follow these instructions at home:  Medicines  · Take over-the-counter and prescription medicines only as told by your health care provider.  · Do not take the following medicines unless your health care provider approves:  ? NSAIDs, such as  ibuprofen, naproxen, or celecoxib.  ? Vitamin supplements that contain vitamin A, vitamin E, or both.  Lifestyle  · Follow an exercise program approved by your health care provider. Aim for 150 minutes of moderate exercise or 75 minutes of vigorous exercise each week.  · Maintain a healthy weight or lose weight as approved by your health care provider.  · Learn to manage stress or try to limit your stress. Ask your health care provider for suggestions if you need help.  · Get screened for depression and seek treatment, if needed.  · Do not use any products that contain nicotine or tobacco, such as cigarettes, e-cigarettes, and chewing tobacco. If you need help quitting, ask your health care provider.  · Do not use illegal drugs.  Eating and drinking    · Follow a heart-healthy diet. A dietitian can help educate you about healthy food options and changes. In general, eat plenty of fruits and vegetables, lean meats, and whole grains.  · Avoid foods high in:  ? Sugar.  ? Salt (sodium).  ? Saturated fat, such as processed or fatty meat.  ? Trans fat, such as fried foods.  · Use healthy cooking methods such as roasting, grilling, broiling, baking, poaching, steaming, or stir-frying.  · Do not drink alcohol if your health care provider tells you not to drink.  · If you drink alcohol:  ? Limit how much you have to 0-2 drinks per day.  ? Be aware of how much alcohol is in your drink. In the [place] one drink equals one 12 oz bottle of beer (355 mL), one 5 oz glass of wine (148 mL), or one 1½ oz glass of hard liquor (44 mL).  General instructions  · Manage any other health conditions, such as hypertension and diabetes. These conditions affect your heart.  · Your health care provider may ask you to monitor your blood pressure. Ideally, your blood pressure should be below 130/80.  · Keep all follow-up visits as told by your health care provider. This is important.  Get help right away if:  · You have pain in your chest, neck,  ear, arm, jaw, stomach, or back that:  ? Lasts more than a few minutes.  ? Is recurring.  ? Is not relieved by taking medicine under your tongue (sublingual nitroglycerin).  · You have profuse sweating without cause.  · You have unexplained:  ? Heartburn or indigestion.  ? Shortness of breath or difficulty breathing.  ? Fluttering or fast heartbeat (palpitations).  ? Nausea or vomiting.  ? Fatigue.  ? Feelings of nervousness or anxiety.  ? Weakness.  ? Diarrhea.  · You have sudden light-headedness or dizziness.  · You faint.  · You feel like hurting yourself or think about taking your own life.  These symptoms may represent a serious problem that is an emergency. Do not wait to see if the symptoms will go away. Get medical help right away. Call your local emergency services (911 in the [place] Do not drive yourself to the hospital.  Summary  · Coronary artery disease (CAD) is a condition in which the arteries that lead to the heart (coronary arteries) become narrow or blocked. The narrowing or blockage can lead to a heart attack.  · Many people do not have any symptoms during the early stages of CAD.  · CAD can be treated with lifestyle changes, medicines, surgery, or a combination of these treatments.  This information is not intended to replace advice given to you by your health care provider. Make sure you discuss any questions you have with your health care provider.  Document Revised: 09/06/2019 Document Reviewed: 08/27/2019  [email or web address] Patient Education © 2021 [employer]

## 2021-07-20 NOTE — PROGRESS NOTES
Subjective:     Encounter Date:07/20/2021      Patient ID: Eduardo Altman is a 55 y.o. male with a previous medical history of coronary artery disease, hypertension, hyperlipidemia, gastroesophageal reflux disease, former smoker who presents to the office today for complaints of recently elevated blood pressure.    Chief Complaint: Uncontrolled blood pressure  Coronary Artery Disease  Presents for follow-up visit. Symptoms include chest pain and leg swelling (mild edema that waxes and wanes). Pertinent negatives include no chest pressure, chest tightness, muscle weakness, palpitations, shortness of breath or weight gain. Risk factors include hyperlipidemia and obesity. The symptoms have been stable. Compliance with diet is good. Compliance with exercise is good. Compliance with medications is good.   Hypertension  This is a chronic problem. The current episode started more than 1 year ago. The problem has been rapidly worsening since onset. The problem is uncontrolled. Associated symptoms include chest pain, headaches and peripheral edema ( mild). Pertinent negatives include no malaise/fatigue, orthopnea, palpitations, PND or shortness of breath. Risk factors for coronary artery disease include dyslipidemia and male gender. Current antihypertension treatment includes ACE inhibitors and beta blockers. There are no compliance problems.  Hypertensive end-organ damage includes CAD/MI. There is no history of angina, kidney disease or heart failure.   Hyperlipidemia  This is a chronic problem. The current episode started more than 1 year ago. The problem is controlled. Recent lipid tests were reviewed and are low. Exacerbating diseases include obesity. He has no history of diabetes. Associated symptoms include chest pain. Pertinent negatives include no focal weakness, leg pain, myalgias or shortness of breath. Current antihyperlipidemic treatment includes statins. Compliance problems include medication side effects  (rash).  Risk factors for coronary artery disease include dyslipidemia, male sex and hypertension.     Mr. Altman presents to the office today for complaints of recently elevated blood pressure.  He reports that he has been taking his medications as prescribed but noticed on Saturday a significantly elevated blood pressure after awakening with a headache.  Thinking back, he states the prior week he did not feel well but was not monitoring his blood pressure at home.  He denies any recent dietary changes which would have contributed to his blood pressure changes.    He has a history of ischemic heart disease with previous AMI in 2016 and emergent presentation earlier this year in February.  He is compliant with his dual antiplatelet therapy at present.  He was discharged home on statin therapy however developed dermatitis to his upper extremities and hands which resolved after stopping atorvastatin.  He has not been placed on any other statin to evaluate similar reactions.  He has been managed on fenofibrate due to his history of hypertriglyceridemia.    Saturday when he woke up with a headache he checked his blood pressure.  He notes the following readings recently    165/112 saturday  171/115 saturday  160/97 this am     He reports associated chest pressure, describing this differently than his previous ischemic chest pain where he also had associated chest burning and headache as previously mentioned.      The following portions of the patient's history were reviewed and updated as appropriate: allergies, current medications, past family history, past medical history, past social history and past surgical history.     Allergies   Allergen Reactions   • Atorvastatin Itching       Current Outpatient Medications:   •  aspirin 81 MG chewable tablet, Chew 81 mg Daily., Disp: , Rfl:   •  fenofibrate 160 MG tablet, Take 160 mg by mouth Daily., Disp: , Rfl:   •  metoprolol tartrate (LOPRESSOR) 25 MG tablet, Take 1 tablet by  "mouth Every 12 (Twelve) Hours., Disp: 180 tablet, Rfl: 3  •  nitroglycerin (NITROSTAT) 0.4 MG SL tablet, Place 1 tablet under the tongue Every 5 (Five) Minutes As Needed for Chest Pain. Take no more than 3 doses in 15 minutes., Disp: 25 tablet, Rfl: 1  •  pantoprazole (PROTONIX) 40 MG EC tablet, Take 40 mg by mouth Daily., Disp: , Rfl:   •  ticagrelor (BRILINTA) 90 MG tablet tablet, Take 1 tablet by mouth 2 (Two) Times a Day., Disp: 180 tablet, Rfl: 3  •  lisinopril (PRINIVIL,ZESTRIL) 20 MG tablet, Take 1 tablet by mouth Daily., Disp: 30 tablet, Rfl: 1  •  rosuvastatin (CRESTOR) 10 MG tablet, Take 1 tablet by mouth Daily., Disp: 30 tablet, Rfl: 11    Review of Systems   Constitutional: Negative for malaise/fatigue, weight gain and weight loss.   Cardiovascular: Positive for chest pain and leg swelling (mild edema that waxes and wanes). Negative for dyspnea on exertion, orthopnea, palpitations and paroxysmal nocturnal dyspnea.   Respiratory: Negative for chest tightness, cough, shortness of breath and wheezing.    Hematologic/Lymphatic: Negative for bleeding problem. Bruises/bleeds easily.   Musculoskeletal: Negative for muscle weakness and myalgias.   Gastrointestinal: Negative for bloating, nausea and vomiting.   Neurological: Positive for headaches. Negative for focal weakness and light-headedness.   Psychiatric/Behavioral: Negative for altered mental status.         ECG 12 Lead    Date/Time: 7/20/2021 10:10 AM  Performed by: Pamela Maxwell APRN  Authorized by: Pamela Maxwell APRN   Comparison: compared with previous ECG from 2/22/2021  Similar to previous ECG  Rhythm: sinus rhythm  Rate: normal  BPM: 66  Conduction: conduction normal  Q waves: V1, V2 and V3    QRS axis: left  Other findings: T wave abnormality    Clinical impression: abnormal EKG          /90 (BP Location: Right arm, Patient Position: Sitting, Cuff Size: Adult)   Pulse 66   Resp 18   Ht 188 cm (74\")   Wt 115 kg (253 lb)   SpO2 97%  "  BMI 32.48 kg/m²        Objective:     Vitals reviewed.   Constitutional:       General: Not in acute distress.     Appearance: Healthy appearance. Well-developed, well-groomed and not in distress. Not diaphoretic.   Eyes:      General:         Right eye: No discharge.         Left eye: No discharge.   HENT:      Head: Normocephalic and atraumatic.    Mouth/Throat:      Pharynx: No oropharyngeal exudate.   Pulmonary:      Effort: Pulmonary effort is normal. No respiratory distress.      Breath sounds: Normal breath sounds. No wheezing. No rhonchi. No rales.   Chest:      Chest wall: Not tender to palpatation.   Cardiovascular:      Normal rate. Regular rhythm.      Murmurs: There is no murmur.      No gallop. No rub.   Edema:     Peripheral edema absent.   Abdominal:      General: There is no distension.      Palpations: Abdomen is soft.      Tenderness: There is no abdominal tenderness.   Musculoskeletal: Normal range of motion.      Cervical back: Normal range of motion and neck supple. Skin:     General: Skin is warm and dry.      Coloration: Skin is not pale.      Findings: No erythema or rash.   Neurological:      Mental Status: Alert, oriented to person, place, and time and oriented to person, place and time.   Psychiatric:         Mood and Affect: Mood normal.         Speech: Speech normal.         Behavior: Behavior normal. Behavior is cooperative.         Thought Content: Thought content normal.         Cognition and Memory: Cognition normal.         Judgment: Judgment normal.       Lab Review:   Results for orders placed during the hospital encounter of 02/01/21    Adult Transthoracic Echo Complete W/ Cont if Necessary Per Protocol    Interpretation Summary  · Left ventricular wall thickness is consistent with concentric hypertrophy.  · The following left ventricular wall segments are hypokinetic: apical lateral, apical septal and apex hypokinetic.  · Estimated left ventricular EF = 50% Left ventricular  systolic function is normal.  · Left ventricular diastolic function is consistent with (grade I) impaired relaxation.    Cardiac catheterization 2/1/2021  Impression:  1.  Acute anterior STEMI, secondary to proximal LAD occlusion, just proximal to previously placed stent.  2.  Successful PCI to the proximal mid left anterior descending artery as outlined above.  3.  Severe stenosis in the first obtuse marginal branch, left to be medically managed at this time.      Assessment:          Diagnosis Plan   1. Essential hypertension     2. Coronary artery disease involving native coronary artery of native heart without angina pectoris     3. Hyperlipidemia LDL goal <70     4. ST elevation myocardial infarction involving left anterior descending (LAD) coronary artery (CMS/[facility])     5. Class 1 obesity due to excess calories with serious comorbidity and body mass index (BMI) of 32.0 to 32.9 in adult            Plan:       -Hypertension: Recently reports elevated blood pressure despite compliance with home medications.  Increase lisinopril from 5 mg daily to 20 mg daily.  Monitor blood pressure at home.  We will send a prescription into his local pharmacy and if no further adjustments are needed to his dose we will send in a long-term prescription to his mail order pharmacy after he calls us back to report further blood pressure readings.  Low-sodium diet.  Tinea metoprolol tartrate 25 mg twice daily.    - Coronary artery disease: History of CAD with previous AMI in 2016 and more recently in February 2021.  Currently on dual antiplatelet therapy with aspirin and Brilinta.  LVEF was 50% at the time of most recent heart attack.  He is on low-dose beta-blocker and ACE inhibitor therapies.    -Hyperlipidemia: we will retry statin therapy with Crestor. If he has recurrence of rash we will abort further statin trials and discuss possible [id number] therapy for lipid management. He remains on fenofibrate, for management of  triglycerides.     -ST elevation myocardial infarction: Recent anterior STEMI February 2021 with PCI per Dr. Jeffrey.  Xience Miguelina drug-eluting stent 3.0 x 23 mm Xience Miguelina drug-eluting stent 2.75 x 23 mm, both to the LAD.    -Obesity: BMI 32.48.  Diet and exercise recommended.        Follow-up for routine visit in 6 months.  The patient will call our office back to discuss his blood pressure readings after adjustments were made today to his blood pressure medication dose.

## 2021-08-11 RX ORDER — LISINOPRIL 20 MG/1
TABLET ORAL
Qty: 90 TABLET | Refills: 4 | Status: SHIPPED | OUTPATIENT
Start: 2021-08-11 | End: 2021-10-08 | Stop reason: SDUPTHER

## 2021-10-09 RX ORDER — LISINOPRIL 20 MG/1
20 TABLET ORAL DAILY
Qty: 90 TABLET | Refills: 4 | Status: SHIPPED | OUTPATIENT
Start: 2021-10-09 | End: 2022-02-16 | Stop reason: SDUPTHER

## 2021-11-15 DIAGNOSIS — E78.2 MIXED HYPERLIPIDEMIA: ICD-10-CM

## 2021-11-16 RX ORDER — FENOFIBRATE 160 MG/1
160 TABLET ORAL DAILY
Qty: 90 TABLET | Refills: 3 | Status: SHIPPED | OUTPATIENT
Start: 2021-11-16 | End: 2022-07-26 | Stop reason: SDUPTHER

## 2021-11-16 NOTE — TELEPHONE ENCOUNTER
Received fax from pharmacy requesting refill on pts medication(s). Pt was last seen in office on 2/9/2021  and has a follow up scheduled for Visit date not found. Will send request to  Dr. Chucho Lawson  for authorization.      Requested Prescriptions     Pending Prescriptions Disp Refills    fenofibrate (TRIGLIDE) 160 MG tablet [Pharmacy Med Name: FENOFIBRATE TABS 160MG] 90 tablet 3     Sig: Take 1 tablet by mouth daily

## 2021-11-17 ENCOUNTER — TELEPHONE (OUTPATIENT)
Dept: CARDIOLOGY | Facility: CLINIC | Age: 56
End: 2021-11-17

## 2021-11-17 NOTE — TELEPHONE ENCOUNTER
Patient is having dental procedure and is needing to stop his brilinta for 3 days before procedure. I believe he just had a heart cath in February. Please advise.

## 2021-11-19 NOTE — TELEPHONE ENCOUNTER
I may have already responded to this message but I am not certain.  Therefore, I will address this again.  Please excuse any redundant messages.  The patient had 3 drug-eluting stents placed in his left anterior descending artery in February.  Therefore, based on current guidelines, it would not be considered safe to discontinue Brilinta for a period of 12 months from stent implantation.

## 2022-01-11 DIAGNOSIS — K21.9 GASTROESOPHAGEAL REFLUX DISEASE, UNSPECIFIED WHETHER ESOPHAGITIS PRESENT: ICD-10-CM

## 2022-01-12 RX ORDER — PANTOPRAZOLE SODIUM 40 MG/1
40 TABLET, DELAYED RELEASE ORAL DAILY
Qty: 90 TABLET | Refills: 3 | Status: SHIPPED | OUTPATIENT
Start: 2022-01-12

## 2022-01-12 NOTE — TELEPHONE ENCOUNTER
Received fax from pharmacy requesting refill on pts medication(s). Pt was last seen in office on 2/9/2021  and has a follow up scheduled for Visit date not found. Will send request to  Dr. Brian Mock  for authorization.      Requested Prescriptions     Pending Prescriptions Disp Refills    pantoprazole (PROTONIX) 40 MG tablet [Pharmacy Med Name: PANTOPRAZOLE SODIUM DR TABS 40MG] 90 tablet 3     Sig: Take 1 tablet by mouth daily TAKE 1 TABLET DAILY

## 2022-02-15 ENCOUNTER — TELEPHONE (OUTPATIENT)
Dept: CARDIOLOGY | Facility: CLINIC | Age: 57
End: 2022-02-15

## 2022-02-16 ENCOUNTER — OFFICE VISIT (OUTPATIENT)
Dept: CARDIOLOGY | Facility: CLINIC | Age: 57
End: 2022-02-16

## 2022-02-16 VITALS
BODY MASS INDEX: 34.14 KG/M2 | HEART RATE: 73 BPM | SYSTOLIC BLOOD PRESSURE: 152 MMHG | DIASTOLIC BLOOD PRESSURE: 92 MMHG | OXYGEN SATURATION: 97 % | HEIGHT: 74 IN | WEIGHT: 266 LBS

## 2022-02-16 DIAGNOSIS — I25.2 HISTORY OF ST ELEVATION MYOCARDIAL INFARCTION (STEMI): ICD-10-CM

## 2022-02-16 DIAGNOSIS — E66.09 CLASS 1 OBESITY DUE TO EXCESS CALORIES WITH SERIOUS COMORBIDITY AND BODY MASS INDEX (BMI) OF 34.0 TO 34.9 IN ADULT: ICD-10-CM

## 2022-02-16 DIAGNOSIS — E78.5 HYPERLIPIDEMIA LDL GOAL <70: ICD-10-CM

## 2022-02-16 DIAGNOSIS — I10 ESSENTIAL HYPERTENSION: ICD-10-CM

## 2022-02-16 DIAGNOSIS — I25.10 CORONARY ARTERY DISEASE INVOLVING NATIVE CORONARY ARTERY OF NATIVE HEART WITHOUT ANGINA PECTORIS: Primary | Chronic | ICD-10-CM

## 2022-02-16 PROCEDURE — 99214 OFFICE O/P EST MOD 30 MIN: CPT | Performed by: NURSE PRACTITIONER

## 2022-02-16 PROCEDURE — 93000 ELECTROCARDIOGRAM COMPLETE: CPT | Performed by: NURSE PRACTITIONER

## 2022-02-16 RX ORDER — LISINOPRIL 40 MG/1
40 TABLET ORAL DAILY
Qty: 90 TABLET | Refills: 3 | Status: SHIPPED | OUTPATIENT
Start: 2022-02-16 | End: 2023-02-06

## 2022-02-16 NOTE — PROGRESS NOTES
Subjective:     Encounter Date:02/16/2022      Patient ID: Eduardo Altman is a 56 y.o. male with known coronary artery disease, previous AMI 2016 and 2021, hypertension, hyperlipidemia, gastroesophageal reflux disease, former smoker who presents to the office today for routine follow up.     Chief Complaint: CAD follow up   Coronary Artery Disease  Presents for follow-up visit. Pertinent negatives include no chest pressure, chest tightness, muscle weakness, palpitations or weight gain. Risk factors include hyperlipidemia and obesity. The symptoms have been stable. Compliance with diet is good. Compliance with exercise is good. Compliance with medications is good.   Hypertension  This is a chronic problem. The current episode started more than 1 year ago. The problem has been rapidly worsening since onset. The problem is uncontrolled. Associated symptoms include peripheral edema ( mild). Pertinent negatives include no malaise/fatigue or palpitations. Risk factors for coronary artery disease include dyslipidemia and male gender. Current antihypertension treatment includes ACE inhibitors and beta blockers. There are no compliance problems.  Hypertensive end-organ damage includes CAD/MI. There is no history of angina or kidney disease.   Hyperlipidemia  This is a chronic problem. The current episode started more than 1 year ago. The problem is controlled. Recent lipid tests were reviewed and are low. Exacerbating diseases include obesity. He has no history of diabetes. Pertinent negatives include no focal weakness or myalgias. Current antihyperlipidemic treatment includes statins. Compliance problems include medication side effects (rash).  Risk factors for coronary artery disease include dyslipidemia, male sex and hypertension.     Mr. Altman presents to the office today for routine follow up. He reports no significant changes since his last visit. His blood pressure medicine was adjusted at his last visit. He does not  check this often and denies any symptoms prompting him to feel the need to check it. He denies any chest pain, pressure, or tightness.  He reports shortness of breath with exertion due to being out of shape.  He has not exercised much recently and gained a little weight. He denies any orthopnea, PND. He has had no rapid weight gain or lower leg swelling. He denies headaches, lightheadedness, or palpitations.  He reports compliance with his medications. He does not keep a log of his blood pressure at home, but does tell me that it runs about 135-150 systolic on average when he checks it.     The following portions of the patient's history were reviewed and updated as appropriate: allergies, current medications, past family history, past medical history, past social history and past surgical history.     Allergies   Allergen Reactions   • Atorvastatin Itching       Current Outpatient Medications:   •  aspirin 81 MG chewable tablet, Chew 81 mg Daily., Disp: , Rfl:   •  Cholecalciferol (VITAMIN D3 PO), Take  by mouth., Disp: , Rfl:   •  fenofibrate 160 MG tablet, Take 160 mg by mouth Daily., Disp: , Rfl:   •  lisinopril (PRINIVIL,ZESTRIL) 40 MG tablet, Take 1 tablet by mouth Daily., Disp: 90 tablet, Rfl: 3  •  metoprolol tartrate (LOPRESSOR) 25 MG tablet, TAKE 1 TABLET BY MOUTH EVERY 12 HOURS, Disp: 180 tablet, Rfl: 4  •  nitroglycerin (NITROSTAT) 0.4 MG SL tablet, Place 1 tablet under the tongue Every 5 (Five) Minutes As Needed for Chest Pain. Take no more than 3 doses in 15 minutes., Disp: 25 tablet, Rfl: 1  •  pantoprazole (PROTONIX) 40 MG EC tablet, Take 40 mg by mouth Daily., Disp: , Rfl:   •  rosuvastatin (CRESTOR) 10 MG tablet, Take 1 tablet by mouth Daily., Disp: 30 tablet, Rfl: 11  •  ticagrelor (BRILINTA) 60 MG tablet tablet, Take 1 tablet by mouth 2 (Two) Times a Day., Disp: 180 tablet, Rfl: 3    Review of Systems   Constitutional: Negative for malaise/fatigue, weight gain and weight loss.   Cardiovascular:  "Negative for dyspnea on exertion and palpitations.   Respiratory: Negative for chest tightness and cough.    Hematologic/Lymphatic: Negative for bleeding problem. Bruises/bleeds easily.   Musculoskeletal: Negative for muscle weakness and myalgias.   Gastrointestinal: Negative for bloating and nausea.   Neurological: Negative for focal weakness and light-headedness.   Psychiatric/Behavioral: Negative for altered mental status.         ECG 12 Lead    Date/Time: 2/16/2022 4:07 PM  Performed by: Pamela Maxwell APRN  Authorized by: Pamela Maxwell APRN   Comparison: compared with previous ECG from 7/20/2021  Similar to previous ECG  Rhythm: sinus rhythm  Ectopy: infrequent PVCs  Rate: normal  BPM: 73  Conduction: conduction normal  QRS axis: normal  Other findings: non-specific ST-T wave changes    Clinical impression: abnormal EKG          /92   Pulse 73   Ht 188 cm (74\")   Wt 121 kg (266 lb)   SpO2 97%   BMI 34.15 kg/m²        Objective:     Vitals reviewed.   Constitutional:       General: Not in acute distress.     Appearance: Healthy appearance. Well-developed, well-groomed and not in distress. Not diaphoretic.   Eyes:      General:         Right eye: No discharge.         Left eye: No discharge.   HENT:      Head: Normocephalic and atraumatic.    Mouth/Throat:      Pharynx: No oropharyngeal exudate.   Pulmonary:      Effort: Pulmonary effort is normal. No respiratory distress.      Breath sounds: Normal breath sounds. No wheezing. No rhonchi. No rales.   Chest:      Chest wall: Not tender to palpatation.   Cardiovascular:      Normal rate. Regular rhythm.      Murmurs: There is no murmur.      No gallop. No rub.   Edema:     Peripheral edema absent.   Abdominal:      General: There is no distension.      Palpations: Abdomen is soft.      Tenderness: There is no abdominal tenderness.   Musculoskeletal: Normal range of motion.      Cervical back: Normal range of motion and neck supple. Skin:     " General: Skin is warm and dry.      Coloration: Skin is not pale.      Findings: No erythema or rash.   Neurological:      Mental Status: Alert, oriented to person, place, and time and oriented to person, place and time.   Psychiatric:         Mood and Affect: Mood normal.         Speech: Speech normal.         Behavior: Behavior normal. Behavior is cooperative.         Thought Content: Thought content normal.         Cognition and Memory: Cognition normal.         Judgment: Judgment normal.       Lab Review:   Results for orders placed during the hospital encounter of 02/01/21    Adult Transthoracic Echo Complete W/ Cont if Necessary Per Protocol    Interpretation Summary  · Left ventricular wall thickness is consistent with concentric hypertrophy.  · The following left ventricular wall segments are hypokinetic: apical lateral, apical septal and apex hypokinetic.  · Estimated left ventricular EF = 50% Left ventricular systolic function is normal.  · Left ventricular diastolic function is consistent with (grade I) impaired relaxation.    Cardiac catheterization 2/1/2021  Impression:  1.  Acute anterior STEMI, secondary to proximal LAD occlusion, just proximal to previously placed stent.  2.  Successful PCI to the proximal mid left anterior descending artery as outlined above.  3.  Severe stenosis in the first obtuse marginal branch, left to be medically managed at this time.      Assessment:          Diagnosis Plan   1. Coronary artery disease involving native coronary artery of native heart without angina pectoris  ECG 12 Lead   2. Essential hypertension  ticagrelor (BRILINTA) 60 MG tablet tablet    lisinopril (PRINIVIL,ZESTRIL) 40 MG tablet   3. Hyperlipidemia LDL goal <70  Lipid Panel   4. History of ST elevation myocardial infarction (STEMI)     5. Class 1 obesity due to excess calories with serious comorbidity and body mass index (BMI) of 34.0 to 34.9 in adult            Plan:       - Coronary artery disease:  Stable. History of CAD with previous AMI in 2016 and more recently in February 2021.  Currently on dual antiplatelet therapy with aspirin and Brilinta.  He has been on Brilinta for 12 months, we will reduce dose to 60 mg BID. He remains on beta blocker and statin therapies.     - Hypertension: Elevated today.  He reports no symptoms with this. He does not monitor it routinely, but states generally runs 135-150 systolic. He is on lisinopril 20 mg and lopressor 25 mg BID. We will increase his lisinopril dose to 40 mg daily.     - Hyperlipidemia: Currently on crestor low dose, had reported rash previously. Currently no complaints. Repeat lipid panel.    - ST elevation myocardial infarction: Anterior STEMI February 2021 with PCI per Dr. Jeffrey.  Xience Miguelina drug-eluting stent 3.0 x 23 mm Xience Miguelina drug-eluting stent 2.75 x 23 mm, both to the LAD.    - Obesity: BMI 34.15. Diet and exercise recommended.      Increase acei for improvement of BP control.   Call with any concerns or complaints.  Check lipids  See PCP for routine visit/labs  Follow up cardiology 6 months.

## 2022-02-16 NOTE — PATIENT INSTRUCTIONS
Hypertension, Adult  Hypertension is another name for high blood pressure. High blood pressure forces your heart to work harder to pump blood. This can cause problems over time.  There are two numbers in a blood pressure reading. There is a top number (systolic) over a bottom number (diastolic). It is best to have a blood pressure that is below 120/80. Healthy choices can help lower your blood pressure, or you may need medicine to help lower it.  What are the causes?  The cause of this condition is not known. Some conditions may be related to high blood pressure.  What increases the risk?  · Smoking.  · Having type 2 diabetes mellitus, high cholesterol, or both.  · Not getting enough exercise or physical activity.  · Being overweight.  · Having too much fat, sugar, calories, or salt (sodium) in your diet.  · Drinking too much alcohol.  · Having long-term (chronic) kidney disease.  · Having a family history of high blood pressure.  · Age. Risk increases with age.  · Race. You may be at higher risk if you are .  · Gender. Men are at higher risk than women before age 45. After age 65, women are at higher risk than men.  · Having obstructive sleep apnea.  · Stress.  What are the signs or symptoms?  · High blood pressure may not cause symptoms. Very high blood pressure (hypertensive crisis) may cause:  ? Headache.  ? Feelings of worry or nervousness (anxiety).  ? Shortness of breath.  ? Nosebleed.  ? A feeling of being sick to your stomach (nausea).  ? Throwing up (vomiting).  ? Changes in how you see.  ? Very bad chest pain.  ? Seizures.  How is this treated?  · This condition is treated by making healthy lifestyle changes, such as:  ? Eating healthy foods.  ? Exercising more.  ? Drinking less alcohol.  · Your health care provider may prescribe medicine if lifestyle changes are not enough to get your blood pressure under control, and if:  ? Your top number is above 130.  ? Your bottom number is above  80.  · Your personal target blood pressure may vary.  Follow these instructions at home:  Eating and drinking    · If told, follow the DASH eating plan. To follow this plan:  ? Fill one half of your plate at each meal with fruits and vegetables.  ? Fill one fourth of your plate at each meal with whole grains. Whole grains include whole-wheat pasta, brown rice, and whole-grain bread.  ? Eat or drink low-fat dairy products, such as skim milk or low-fat yogurt.  ? Fill one fourth of your plate at each meal with low-fat (lean) proteins. Low-fat proteins include fish, chicken without skin, eggs, beans, and tofu.  ? Avoid fatty meat, cured and processed meat, or chicken with skin.  ? Avoid pre-made or processed food.  · Eat less than 1,500 mg of salt each day.  · Do not drink alcohol if:  ? Your doctor tells you not to drink.  ? You are pregnant, may be pregnant, or are planning to become pregnant.  · If you drink alcohol:  ? Limit how much you use to:  § 0-1 drink a day for women.  § 0-2 drinks a day for men.  ? Be aware of how much alcohol is in your drink. In the [place] one drink equals one 12 oz bottle of beer (355 mL), one 5 oz glass of wine (148 mL), or one 1½ oz glass of hard liquor (44 mL).    Lifestyle    · Work with your doctor to stay at a healthy weight or to lose weight. Ask your doctor what the best weight is for you.  · Get at least 30 minutes of exercise most days of the week. This may include walking, swimming, or biking.  · Get at least 30 minutes of exercise that strengthens your muscles (resistance exercise) at least 3 days a week. This may include lifting weights or doing Pilates.  · Do not use any products that contain nicotine or tobacco, such as cigarettes, e-cigarettes, and chewing tobacco. If you need help quitting, ask your doctor.  · Check your blood pressure at home as told by your doctor.  · Keep all follow-up visits as told by your doctor. This is important.    Medicines  · Take  over-the-counter and prescription medicines only as told by your doctor. Follow directions carefully.  · Do not skip doses of blood pressure medicine. The medicine does not work as well if you skip doses. Skipping doses also puts you at risk for problems.  · Ask your doctor about side effects or reactions to medicines that you should watch for.  Contact a doctor if you:  · Think you are having a reaction to the medicine you are taking.  · Have headaches that keep coming back (recurring).  · Feel dizzy.  · Have swelling in your ankles.  · Have trouble with your vision.  Get help right away if you:  · Get a very bad headache.  · Start to feel mixed up (confused).  · Feel weak or numb.  · Feel faint.  · Have very bad pain in your:  ? Chest.  ? Belly (abdomen).  · Throw up more than once.  · Have trouble breathing.  Summary  · Hypertension is another name for high blood pressure.  · High blood pressure forces your heart to work harder to pump blood.  · For most people, a normal blood pressure is less than 120/80.  · Making healthy choices can help lower blood pressure. If your blood pressure does not get lower with healthy choices, you may need to take medicine.  This information is not intended to replace advice given to you by your health care provider. Make sure you discuss any questions you have with your health care provider.  Document Revised: 08/28/2019 Document Reviewed: 08/28/2019  [employer] Patient Education © 2021 [employer]  Coronary Artery Disease, Male  Coronary artery disease (CAD) is a condition in which the arteries that lead to the heart (coronary arteries) become narrow or blocked. The narrowing or blockage can lead to decreased blood flow to the heart. Prolonged reduced blood flow can cause a heart attack (myocardial infarction or MI). This condition may also be called coronary heart disease.  Because CAD is the leading cause of death in men, it is important to understand what causes this condition  and how it is treated.  What are the causes?  CAD is most often caused by atherosclerosis. This is the buildup of fat and cholesterol (plaque) on the inside of the arteries. Over time, the plaque may narrow or block the artery, reducing blood flow to the heart. Plaque can also become weak and break off within a coronary artery and cause a sudden blockage. Other less common causes of CAD include:  · A blood clot or a piece of a blood clot or other substance that blocks the flow of blood in a coronary artery (embolism).  · A tearing of the artery (spontaneous coronary artery dissection).  · An enlargement of an artery (aneurysm).  · Inflammation (vasculitis) in the artery wall.  What increases the risk?  The following factors may make you more likely to develop this condition:  · Age. Men over age 45 are at a greater risk of CAD.  · Family history of CAD.  · Gender. Men often develop CAD earlier in life than women.  · High blood pressure (hypertension).  · Diabetes.  · High cholesterol levels.  · Tobacco use.  · Excessive alcohol use.  · Lack of exercise.  · A diet high in saturated and trans fats, such as fried food and processed meat.  Other possible risk factors include:  · High stress levels.  · Depression.  · Obesity.  · Sleep apnea.  What are the signs or symptoms?  Many people do not have any symptoms during the early stages of CAD. As the condition progresses, symptoms may include:  · Chest pain (angina). The pain can:  ? Feel like crushing or squeezing, or like a tightness, pressure, fullness, or heaviness in the chest.  ? Last more than a few minutes or can stop and recur. The pain tends to get worse with exercise or stress and to fade with rest.  · Pain in the arms, neck, jaw, ear, or back.  · Unexplained heartburn or indigestion.  · Shortness of breath.  · Nausea or vomiting.  · Sudden light-headedness.  · Sudden cold sweats.  · Fluttering or fast heartbeat (palpitations).  How is this diagnosed?  This  condition is diagnosed based on:  · Your family and medical history.  · A physical exam.  · Tests, including:  ? A test to check the electrical signals in your heart (electrocardiogram).  ? Exercise stress test. This looks for signs of blockage when the heart is stressed with exercise, such as running on a treadmill.  ? Pharmacologic stress test. This test looks for signs of blockage when the heart is being stressed with a medicine.  ? Blood tests.  ? Coronary angiogram. This is a procedure to look at the coronary arteries to see if there is any blockage. During this test, a dye is injected into your arteries so they appear on an X-ray.  ? Coronary artery CT scan. This CT scan helps detect calcium deposits in your coronary arteries. Calcium deposits are an indicator of CAD.  ? A test that uses sound waves to take a picture of your heart (echocardiogram).  ? Chest X-ray.  How is this treated?  This condition may be treated by:  · Healthy lifestyle changes to reduce risk factors.  · Medicines such as:  ? Antiplatelet medicines and blood-thinning medicines, such as aspirin. These help to prevent blood clots.  ? Nitroglycerin.  ? Blood pressure medicines.  ? Cholesterol-lowering medicine.  · Coronary angioplasty and stenting. During this procedure, a thin, flexible tube is inserted through a blood vessel and into a blocked artery. A balloon or similar device on the end of the tube is inflated to open up the artery. In some cases, a small, mesh tube (stent) is inserted into the artery to keep it open.  · Coronary artery bypass surgery. During this surgery, veins or arteries from other parts of the body are used to create a bypass around the blockage and allow blood to reach your heart.  Follow these instructions at home:  Medicines  · Take over-the-counter and prescription medicines only as told by your health care provider.  · Do not take the following medicines unless your health care provider approves:  ? NSAIDs, such  as ibuprofen, naproxen, or celecoxib.  ? Vitamin supplements that contain vitamin A, vitamin E, or both.  Lifestyle  · Follow an exercise program approved by your health care provider. Aim for 150 minutes of moderate exercise or 75 minutes of vigorous exercise each week.  · Maintain a healthy weight or lose weight as approved by your health care provider.  · Learn to manage stress or try to limit your stress. Ask your health care provider for suggestions if you need help.  · Get screened for depression and seek treatment, if needed.  · Do not use any products that contain nicotine or tobacco, such as cigarettes, e-cigarettes, and chewing tobacco. If you need help quitting, ask your health care provider.  · Do not use illegal drugs.  Eating and drinking    · Follow a heart-healthy diet. A dietitian can help educate you about healthy food options and changes. In general, eat plenty of fruits and vegetables, lean meats, and whole grains.  · Avoid foods high in:  ? Sugar.  ? Salt (sodium).  ? Saturated fat, such as processed or fatty meat.  ? Trans fat, such as fried foods.  · Use healthy cooking methods such as roasting, grilling, broiling, baking, poaching, steaming, or stir-frying.  · Do not drink alcohol if your health care provider tells you not to drink.  · If you drink alcohol:  ? Limit how much you have to 0-2 drinks per day.  ? Be aware of how much alcohol is in your drink. In the [place] one drink equals one 12 oz bottle of beer (355 mL), one 5 oz glass of wine (148 mL), or one 1½ oz glass of hard liquor (44 mL).    General instructions  · Manage any other health conditions, such as hypertension and diabetes. These conditions affect your heart.  · Your health care provider may ask you to monitor your blood pressure. Ideally, your blood pressure should be below 130/80.  · Keep all follow-up visits as told by your health care provider. This is important.  Get help right away if:  · You have pain in your chest,  neck, ear, arm, jaw, stomach, or back that:  ? Lasts more than a few minutes.  ? Is recurring.  ? Is not relieved by taking medicine under your tongue (sublingual nitroglycerin).  · You have profuse sweating without cause.  · You have unexplained:  ? Heartburn or indigestion.  ? Shortness of breath or difficulty breathing.  ? Fluttering or fast heartbeat (palpitations).  ? Nausea or vomiting.  ? Fatigue.  ? Feelings of nervousness or anxiety.  ? Weakness.  ? Diarrhea.  · You have sudden light-headedness or dizziness.  · You faint.  · You feel like hurting yourself or think about taking your own life.  These symptoms may represent a serious problem that is an emergency. Do not wait to see if the symptoms will go away. Get medical help right away. Call your local emergency services (911 in the [place] Do not drive yourself to the hospital.  Summary  · Coronary artery disease (CAD) is a condition in which the arteries that lead to the heart (coronary arteries) become narrow or blocked. The narrowing or blockage can lead to a heart attack.  · Many people do not have any symptoms during the early stages of CAD.  · CAD can be treated with lifestyle changes, medicines, surgery, or a combination of these treatments.  This information is not intended to replace advice given to you by your health care provider. Make sure you discuss any questions you have with your health care provider.  Document Revised: 09/06/2019 Document Reviewed: 08/27/2019  [employer] Patient Education © 2021 [employer]

## 2022-02-23 ENCOUNTER — TELEPHONE (OUTPATIENT)
Dept: CARDIOLOGY | Facility: CLINIC | Age: 57
End: 2022-02-23

## 2022-02-23 LAB
CHOLESTEROL, TOTAL: 109 MG/DL (ref 160–199)
HDLC SERPL-MCNC: 20 MG/DL (ref 55–121)
LDL CHOLESTEROL CALCULATED: 28 MG/DL
TRIGL SERPL-MCNC: 307 MG/DL (ref 0–149)

## 2022-02-23 NOTE — TELEPHONE ENCOUNTER
Patient has been notified to take his BP about 3 hours after taking his medication then call me back next week with BP readings.

## 2022-02-23 NOTE — TELEPHONE ENCOUNTER
Mr Anupama called today to let you know his BP is still 160/95 and 175/110. He is taking Lisinopril 40mg

## 2022-05-04 DIAGNOSIS — I25.10 CORONARY ARTERY DISEASE INVOLVING NATIVE HEART WITHOUT ANGINA PECTORIS, UNSPECIFIED VESSEL OR LESION TYPE: Primary | ICD-10-CM

## 2022-05-04 RX ORDER — NITROGLYCERIN 0.4 MG/1
0.4 TABLET SUBLINGUAL PRN
Qty: 25 TABLET | Refills: 3 | Status: SHIPPED | OUTPATIENT
Start: 2022-05-04

## 2022-05-04 NOTE — TELEPHONE ENCOUNTER
----- Message from Hannah Rehman sent at 5/4/2022  2:35 PM CDT -----  Subject: Refill Request    QUESTIONS  Name of Medication? nitroGLYCERIN (NITROSTAT) 0.4 MG SL tablet  Patient-reported dosage and instructions? Pt would take every 5 minutes as   PRN and no more than 3 doses in 15 minutes  How many days do you have left? 0  Preferred Pharmacy? [facility] phone number (if available)? [phone]  Additional Information for Provider? Pt lost/washed his script for   nitroglycerin and was the script was from [facility]. Pt would like a   script for 30 day supply please  ---------------------------------------------------------------------------  --------------  CALL BACK INFO  What is the best way for the office to contact you? OK to leave message on   voicemail  Preferred Call Back Phone Number? [phone]  ---------------------------------------------------------------------------  --------------  SCRIPT ANSWERS  Relationship to Patient?  Self

## 2022-07-14 RX ORDER — ROSUVASTATIN CALCIUM 10 MG/1
TABLET, COATED ORAL
Qty: 30 TABLET | Refills: 11 | Status: SHIPPED | OUTPATIENT
Start: 2022-07-14

## 2022-07-21 DIAGNOSIS — I10 PRIMARY HYPERTENSION: Primary | ICD-10-CM

## 2022-07-21 DIAGNOSIS — E78.2 MIXED HYPERLIPIDEMIA: ICD-10-CM

## 2022-07-21 DIAGNOSIS — I25.10 CORONARY ARTERY DISEASE INVOLVING NATIVE HEART WITHOUT ANGINA PECTORIS, UNSPECIFIED VESSEL OR LESION TYPE: ICD-10-CM

## 2022-07-21 DIAGNOSIS — I10 PRIMARY HYPERTENSION: ICD-10-CM

## 2022-07-21 LAB
ALBUMIN SERPL-MCNC: 4.7 G/DL (ref 3.5–5.2)
ALP BLD-CCNC: 58 U/L (ref 40–130)
ALT SERPL-CCNC: 43 U/L (ref 5–41)
ANION GAP SERPL CALCULATED.3IONS-SCNC: 12 MMOL/L (ref 7–19)
AST SERPL-CCNC: 35 U/L (ref 5–40)
BASOPHILS ABSOLUTE: 0.1 K/UL (ref 0–0.2)
BASOPHILS RELATIVE PERCENT: 0.7 % (ref 0–1)
BILIRUB SERPL-MCNC: 0.5 MG/DL (ref 0.2–1.2)
BUN BLDV-MCNC: 22 MG/DL (ref 6–20)
CALCIUM SERPL-MCNC: 9.6 MG/DL (ref 8.6–10)
CHLORIDE BLD-SCNC: 106 MMOL/L (ref 98–111)
CHOLESTEROL, TOTAL: 114 MG/DL (ref 160–199)
CO2: 25 MMOL/L (ref 22–29)
CREAT SERPL-MCNC: 1.4 MG/DL (ref 0.5–1.2)
CREATININE URINE: 221.2 MG/DL (ref 4.2–622)
EOSINOPHILS ABSOLUTE: 0.3 K/UL (ref 0–0.6)
EOSINOPHILS RELATIVE PERCENT: 4.1 % (ref 0–5)
GFR AFRICAN AMERICAN: >59
GFR NON-AFRICAN AMERICAN: 52
GLUCOSE BLD-MCNC: 83 MG/DL (ref 74–109)
HCT VFR BLD CALC: 44.3 % (ref 42–52)
HDLC SERPL-MCNC: 18 MG/DL (ref 55–121)
HEMOGLOBIN: 14.1 G/DL (ref 14–18)
IMMATURE GRANULOCYTES #: 0.1 K/UL
LDL CHOLESTEROL CALCULATED: 28 MG/DL
LYMPHOCYTES ABSOLUTE: 2.4 K/UL (ref 1.1–4.5)
LYMPHOCYTES RELATIVE PERCENT: 32.9 % (ref 20–40)
MCH RBC QN AUTO: 27.9 PG (ref 27–31)
MCHC RBC AUTO-ENTMCNC: 31.8 G/DL (ref 33–37)
MCV RBC AUTO: 87.5 FL (ref 80–94)
MICROALBUMIN UR-MCNC: <1.2 MG/DL (ref 0–19)
MICROALBUMIN/CREAT UR-RTO: NORMAL MG/G
MONOCYTES ABSOLUTE: 0.7 K/UL (ref 0–0.9)
MONOCYTES RELATIVE PERCENT: 9.4 % (ref 0–10)
NEUTROPHILS ABSOLUTE: 3.8 K/UL (ref 1.5–7.5)
NEUTROPHILS RELATIVE PERCENT: 52.1 % (ref 50–65)
PDW BLD-RTO: 13.6 % (ref 11.5–14.5)
PLATELET # BLD: 292 K/UL (ref 130–400)
PMV BLD AUTO: 10.6 FL (ref 9.4–12.4)
POTASSIUM SERPL-SCNC: 4.4 MMOL/L (ref 3.5–5)
PROSTATE SPECIFIC ANTIGEN: 0.49 NG/ML (ref 0–4)
RBC # BLD: 5.06 M/UL (ref 4.7–6.1)
SODIUM BLD-SCNC: 143 MMOL/L (ref 136–145)
T4 FREE: 1.05 NG/DL (ref 0.93–1.7)
TOTAL PROTEIN: 7.3 G/DL (ref 6.6–8.7)
TRIGL SERPL-MCNC: 338 MG/DL (ref 0–149)
TSH SERPL DL<=0.05 MIU/L-ACNC: 2.78 UIU/ML (ref 0.27–4.2)
WBC # BLD: 7.3 K/UL (ref 4.8–10.8)

## 2022-07-22 ENCOUNTER — TELEPHONE (OUTPATIENT)
Dept: PRIMARY CARE CLINIC | Age: 57
End: 2022-07-22

## 2022-07-22 NOTE — TELEPHONE ENCOUNTER
----- Message from JOSÉ MIGUEL Casey sent at 7/21/2022  1:12 PM CDT -----  Please call patient and let them know results. Normal blood counts  No pathologic protein in urine.        Other labs pending electronic

## 2022-07-26 ENCOUNTER — OFFICE VISIT (OUTPATIENT)
Dept: PRIMARY CARE CLINIC | Age: 57
End: 2022-07-26
Payer: OTHER GOVERNMENT

## 2022-07-26 VITALS
WEIGHT: 275 LBS | BODY MASS INDEX: 35.29 KG/M2 | OXYGEN SATURATION: 98 % | HEART RATE: 66 BPM | TEMPERATURE: 97 F | SYSTOLIC BLOOD PRESSURE: 140 MMHG | HEIGHT: 74 IN | DIASTOLIC BLOOD PRESSURE: 96 MMHG

## 2022-07-26 DIAGNOSIS — I25.10 CORONARY ARTERY DISEASE INVOLVING NATIVE CORONARY ARTERY OF NATIVE HEART WITHOUT ANGINA PECTORIS: ICD-10-CM

## 2022-07-26 DIAGNOSIS — N18.31 STAGE 3A CHRONIC KIDNEY DISEASE (HCC): ICD-10-CM

## 2022-07-26 DIAGNOSIS — Z00.00 VISIT FOR PREVENTIVE HEALTH EXAMINATION: Primary | ICD-10-CM

## 2022-07-26 DIAGNOSIS — K21.9 GASTROESOPHAGEAL REFLUX DISEASE, UNSPECIFIED WHETHER ESOPHAGITIS PRESENT: ICD-10-CM

## 2022-07-26 DIAGNOSIS — I10 ESSENTIAL HYPERTENSION: ICD-10-CM

## 2022-07-26 DIAGNOSIS — E78.2 MIXED HYPERLIPIDEMIA: ICD-10-CM

## 2022-07-26 PROCEDURE — 99396 PREV VISIT EST AGE 40-64: CPT | Performed by: PEDIATRICS

## 2022-07-26 RX ORDER — ROSUVASTATIN CALCIUM 10 MG/1
1 TABLET, COATED ORAL DAILY
COMMUNITY
Start: 2022-07-14

## 2022-07-26 RX ORDER — LISINOPRIL 40 MG/1
1 TABLET ORAL DAILY
COMMUNITY
Start: 2022-05-15

## 2022-07-26 RX ORDER — TICAGRELOR 60 MG/1
1 TABLET ORAL 2 TIMES DAILY
COMMUNITY
Start: 2022-05-15

## 2022-07-26 RX ORDER — ZINC GLUCONATE 50 MG
50 TABLET ORAL DAILY
COMMUNITY

## 2022-07-26 RX ORDER — ASCORBIC ACID 500 MG
1000 TABLET ORAL DAILY
COMMUNITY

## 2022-07-26 RX ORDER — ACETAMINOPHEN 160 MG
TABLET,DISINTEGRATING ORAL
COMMUNITY

## 2022-07-26 RX ORDER — FENOFIBRATE 160 MG/1
160 TABLET ORAL DAILY
Qty: 90 TABLET | Refills: 3 | Status: SHIPPED | OUTPATIENT
Start: 2022-07-26

## 2022-07-26 RX ORDER — AMLODIPINE BESYLATE 5 MG/1
5 TABLET ORAL DAILY
Qty: 90 TABLET | Refills: 3 | Status: SHIPPED | OUTPATIENT
Start: 2022-07-26

## 2022-07-26 SDOH — ECONOMIC STABILITY: FOOD INSECURITY: WITHIN THE PAST 12 MONTHS, THE FOOD YOU BOUGHT JUST DIDN'T LAST AND YOU DIDN'T HAVE MONEY TO GET MORE.: NEVER TRUE

## 2022-07-26 SDOH — ECONOMIC STABILITY: FOOD INSECURITY: WITHIN THE PAST 12 MONTHS, YOU WORRIED THAT YOUR FOOD WOULD RUN OUT BEFORE YOU GOT MONEY TO BUY MORE.: NEVER TRUE

## 2022-07-26 ASSESSMENT — ENCOUNTER SYMPTOMS
ABDOMINAL PAIN: 0
SINUS PRESSURE: 0
RHINORRHEA: 0
GASTROINTESTINAL NEGATIVE: 1
EYES NEGATIVE: 1
CHEST TIGHTNESS: 0
TROUBLE SWALLOWING: 0
ALLERGIC/IMMUNOLOGIC NEGATIVE: 1
WHEEZING: 0
RESPIRATORY NEGATIVE: 1
SORE THROAT: 0
CONSTIPATION: 0
COUGH: 0
NAUSEA: 0
VOMITING: 0
ROS SKIN COMMENTS: NO NEW OR SUSPICIOUS SKIN LESIONS
BACK PAIN: 0
DIARRHEA: 0
SHORTNESS OF BREATH: 0

## 2022-07-26 ASSESSMENT — SOCIAL DETERMINANTS OF HEALTH (SDOH): HOW HARD IS IT FOR YOU TO PAY FOR THE VERY BASICS LIKE FOOD, HOUSING, MEDICAL CARE, AND HEATING?: NOT HARD AT ALL

## 2022-07-26 ASSESSMENT — PATIENT HEALTH QUESTIONNAIRE - PHQ9
SUM OF ALL RESPONSES TO PHQ QUESTIONS 1-9: 0
2. FEELING DOWN, DEPRESSED OR HOPELESS: 0
SUM OF ALL RESPONSES TO PHQ QUESTIONS 1-9: 0

## 2022-07-26 NOTE — PROGRESS NOTES
[facility], [address]  Phone [phone]   Fax [phone]      OFFICE VISIT: 2022    Audra Chiu Shira-: 1965      HPI  Reason For Visit:  Audra Chiu is a 60 y.o. Annual Exam, Discuss Labs (Goes back to see heart doctor in August. She wanted him to have labs done and see PCP prior to seeing her again. ), Medication Refill, and Health Maintenance (Shingles shot, covid shot, )    Patient presents for routine wellness evaluation. Present concerns:  He needs a medication refills. He states that he had a cardiology evaluation at [facility] with JOSÉ MIGUEL Lawler on 2022. At that visit it appears she prescribed Brilinta 60 mg twice daily. She prescribed lisinopril 40 mg daily, increased from 20 mg daily. Blood pressure was elevated at that visit at 152/92. She also recommended that he initiate diet and exercise. BMI at that visit was 34.15    Hypertension:   BP today was   BP Readings from Last 1 Encounters:   22 (!) 140/96      Recent BP readings:    BP Readings from Last 3 Encounters:   22 (!) 140/96   21 118/80   20 122/82     Medication   Lisinopril 40 mg daily   Metroprolol tartrate 25 mg twice daily  Medication compliance:  compliant most of the time  Home blood pressure monitoring: No.    He is not adherent to a low sodium diet. Symptoms: None  Laboratory:  Lab Results   Component Value Date    BUN 22 (H) 2022    CREATININE 1.4 (H) 2022       Hyperlipidemia:   Medication:   rosuvastatin (Crestor) 10 mg nightly and fenofibrate 160 mg daily  Low Fat, Low Choleterol Diet:  yes -some  Myalgias or GI upset: no  The patient exercises intermittently.   Laboratory:    Lab Results   Component Value Date    CHOL 114 (L) 2022    TRIG 338 (H) 2022    HDL 18 (L) 2022    LDLCALC 28 2022    LDLDIRECT 48 (L) 2020      Lab Results   Component Value Date    ALT 43 (H) 2022    AST 35 07/21/2022       Coronary Artery Disease:  Medications:   Beta-blockade: Metoprolol tartrate 25 mg twice daily   RAAS Therapy: Lisinopril 40 mg daily   Lipid Management: Rosuvastatin 10 mg nightly and fenofibrate 160 mg daily   Platelet Inhibition: Brilinta 60 mg twice daily      Aspirin 81 mg daily   Anti-anginal:  Nitroglycerin 0.4 mg as needed  Symptoms: not recently with any chest pain. Nitroglycerin use: none recently  Cardiology follow-up: he follows with cardiology at [facility].  Additional studies: no recent. CKD 3a  He is on RAAS therapy  Blood pressures not well controlled. There is no significant microalbuminuria. He is not on any nephrotoxic drugs      GERD:  Medication:   Protonix 40 mg daily  Symptoms: this is well controlled. height is 6' 2\" (1.88 m) and weight is 275 lb (124.7 kg). His temporal temperature is 97 °F (36.1 °C). His blood pressure is 140/96 (abnormal) and his pulse is 66. His oxygen saturation is 98%. Weight is up 26 pounds from 5 months ago  Body mass index is 35.31 kg/m².     I have reviewed the following with the Mr. Martine Malin   Lab Review  Orders Only on 07/21/2022   Component Date Value    TSH 07/21/2022 2.780     T4 Free 07/21/2022 1.05     PSA 07/21/2022 0.49     Microalbumin, Random Uri* 07/21/2022 <1.20     Creatinine, Ur 07/21/2022 221.2     Microalbumin Creatinine * 07/21/2022 see below     Cholesterol, Total 07/21/2022 114 (A)    Triglycerides 07/21/2022 338 (A)    HDL 07/21/2022 18 (A)    LDL Calculated 07/21/2022 28     Sodium 07/21/2022 143     Potassium 07/21/2022 4.4     Chloride 07/21/2022 106     CO2 07/21/2022 25     Anion Gap 07/21/2022 12     Glucose 07/21/2022 83     BUN 07/21/2022 22 (A)    Creatinine 07/21/2022 1.4 (A)    GFR Non- 07/21/2022 52 (A)    GFR  07/21/2022 >59     Calcium 07/21/2022 9.6     Total Protein 07/21/2022 7.3     Albumin 07/21/2022 4.7     Total Bilirubin 07/21/2022 0.5     Alkaline Phosphatase 07/21/2022 58     ALT 07/21/2022 43 (A)    AST 07/21/2022 35     WBC 07/21/2022 7.3     RBC 07/21/2022 5.06     Hemoglobin 07/21/2022 14.1     Hematocrit 07/21/2022 44.3     MCV 07/21/2022 87.5     MCH 07/21/2022 27.9     MCHC 07/21/2022 31.8 (A)    RDW 07/21/2022 13.6     Platelets 84/52/1882 292     MPV 07/21/2022 10.6     Neutrophils % 07/21/2022 52.1     Lymphocytes % 07/21/2022 32.9     Monocytes % 07/21/2022 9.4     Eosinophils % 07/21/2022 4.1     Basophils % 07/21/2022 0.7     Neutrophils Absolute 07/21/2022 3.8     Immature Granulocytes # 07/21/2022 0.1     Lymphocytes Absolute 07/21/2022 2.4     Monocytes Absolute 07/21/2022 0.70     Eosinophils Absolute 07/21/2022 0.30     Basophils Absolute 07/21/2022 0.10    Orders Only on 02/23/2022   Component Date Value    Cholesterol, Total 02/23/2022 109 (A)    Triglycerides 02/23/2022 307 (A)    HDL 02/23/2022 20 (A)    LDL Calculated 02/23/2022 28      Copies of these are in the chart. Current Outpatient Medications   Medication Sig Dispense Refill    vitamin C (ASCORBIC ACID) 500 MG tablet Take 1,000 mg by mouth in the morning. zinc gluconate 50 MG tablet Take 50 mg by mouth in the morning. Cholecalciferol (VITAMIN D3) 50 MCG (2000 UT) CAPS Take by mouth      lisinopril (PRINIVIL;ZESTRIL) 40 MG tablet Take 1 tablet by mouth in the morning. rosuvastatin (CRESTOR) 10 MG tablet Take 1 tablet by mouth in the morning. BRILINTA 60 MG TABS tablet Take 1 tablet by mouth in the morning and at bedtime      metoprolol tartrate (LOPRESSOR) 25 MG tablet Take 1 tablet by mouth in the morning and 1 tablet before bedtime. 180 tablet 3    fenofibrate (TRIGLIDE) 160 MG tablet Take 1 tablet by mouth in the morning. 90 tablet 3    amLODIPine (NORVASC) 5 MG tablet Take 1 tablet by mouth in the morning.  90 tablet 3    nitroGLYCERIN (NITROSTAT) 0.4 MG SL tablet Place 1 tablet under the tongue as needed for Chest pain 25 tablet 3    pantoprazole (PROTONIX) 40 MG tablet Take 1 tablet by mouth daily TAKE 1 TABLET DAILY 90 tablet 3    aspirin 81 MG tablet Take 81 mg by mouth daily. No current facility-administered medications for this visit. Allergies: Patient has no known allergies. Past Medical History:   Diagnosis Date    ED (erectile dysfunction)     GERD (gastroesophageal reflux disease)     Heart attack ([address] 2016    Hyperlipidemia     Hypertension        Family History   Problem Relation Age of Onset    High Blood Pressure Mother     Diabetes Mother         borderline    Heart Attack Father     Kidney Disease Father     Heart Disease Father     Other Father         adbominal issues    Cancer Sister         Cervical     Heart Attack Paternal Grandmother     Heart Attack Paternal Grandfather        Past Surgical History:   Procedure Laterality Date    AMPUTATION Left     BACK SURGERY      CARDIAC SURGERY      CORONARY ANGIOPLASTY WITH STENT PLACEMENT  2016    SHOULDER SURGERY Right 2018    Dr Penn Kiefer History     Tobacco Use    Smoking status: Former     Packs/day: 1.00     Years: 25.00     Pack years: 25.00     Types: Cigarettes     Quit date:      Years since quittin.5    Smokeless tobacco: Never   Substance Use Topics    Alcohol use: Yes     Comment: rare        Review of Systems   Constitutional: Negative. Negative for appetite change, chills, diaphoresis, fatigue, fever and unexpected weight change. HENT: Negative. Negative for dental problem (following with dentist regularly), ear pain, hearing loss, postnasal drip, rhinorrhea, sinus pressure, sore throat, tinnitus and trouble swallowing. Eyes: Negative. Negative for visual disturbance (following with regular eye exams). Respiratory: Negative. Negative for cough, chest tightness, shortness of breath and wheezing. No orthopnea   Cardiovascular: Negative. Negative for chest pain, palpitations and leg swelling. Gastrointestinal: Negative. Negative for abdominal pain, constipation, diarrhea, nausea and vomiting. No melena or hematochezia   Endocrine: Negative. Negative for cold intolerance, heat intolerance, polydipsia and polyuria. Genitourinary: Negative. Negative for difficulty urinating, dysuria and enuresis. Musculoskeletal: Negative. Negative for arthralgias, back pain, joint swelling and myalgias. Skin: Negative. Negative for rash. No new or suspicious skin lesions   Allergic/Immunologic: Negative. Neurological: Negative. Negative for dizziness, tremors, syncope (or presyncope), weakness, light-headedness and headaches. Hematological: Negative. Negative for adenopathy. Does not bruise/bleed easily. Psychiatric/Behavioral: Negative. Eyes: recommened  Dentist:  yes  Skin:  no  Labs:  done  PSA: done  Nocturia:  1  Stream: normal  ED:  no  Colonoscopy:  [facility] about 5 yrs ago. Exercise: will start  Diet and Nut:   no special  MVI:  recommended  Supplements:  zinc and vit C  Asa: yes  Depression:  no  Body Image: would like to lose  Fall:  no  EOL:  recommended  Tobacco: no   Substance: no  Immunization:  utd  Sleep: no  Cognition: intact    Health Maintenance: utd. Physical Exam  Constitutional:       General: He is not in acute distress. Appearance: He is well-developed. He is obese. HENT:      Head: Normocephalic and atraumatic. Right Ear: Tympanic membrane, ear canal and external ear normal.      Left Ear: Tympanic membrane, ear canal and external ear normal.      Nose: Nose normal.      Mouth/Throat:      Mouth: Mucous membranes are moist.      Pharynx: Oropharynx is clear. Eyes:      General: No scleral icterus. Extraocular Movements: Extraocular movements intact. Conjunctiva/sclera: Conjunctivae normal.      Pupils: Pupils are equal, round, and reactive to light. Neck:      Thyroid: No thyromegaly. Vascular: No carotid bruit or JVD. Cardiovascular:      Rate and Rhythm: Normal rate and regular rhythm. No extrasystoles are present. Chest Wall: PMI is not displaced. Pulses: Normal pulses. Heart sounds: Normal heart sounds, S1 normal and S2 normal. No murmur heard. No friction rub. No gallop. Pulmonary:      Effort: Pulmonary effort is normal. No respiratory distress. Breath sounds: Normal breath sounds. No wheezing, rhonchi or rales. Abdominal:      General: Bowel sounds are normal.      Palpations: Abdomen is soft. Tenderness: There is no abdominal tenderness. There is no guarding or rebound. Genitourinary:     Comments: Exam deferred  Musculoskeletal:         General: No tenderness. Normal range of motion. Cervical back: Normal range of motion and neck supple. Lymphadenopathy:      Cervical: No cervical adenopathy. Skin:     General: Skin is warm and dry. Capillary Refill: Capillary refill takes less than 2 seconds. Findings: No rash. Neurological:      General: No focal deficit present. Mental Status: He is alert and oriented to person, place, and time. Mental status is at baseline. Sensory: No sensory deficit (no numbness or tingling). Psychiatric:         Mood and Affect: Mood normal.         Behavior: Behavior normal.         Thought Content: Thought content normal.         Judgment: Judgment normal.           ASSESSMENT      ICD-10-CM    1. Visit for preventive health examination  Z00.00       2. Essential hypertension  I10 metoprolol tartrate (LOPRESSOR) 25 MG tablet     amLODIPine (NORVASC) 5 MG tablet      3. Mixed hyperlipidemia  E78.2 fenofibrate (TRIGLIDE) 160 MG tablet      4. Coronary artery disease involving native coronary artery of native heart without angina pectoris  I25.10       5. Stage 3a chronic kidney disease (HCC)  N18.31       6. Gastroesophageal reflux disease, unspecified whether esophagitis present  K21.9             PLAN    1.  Visit for preventive health examination  Routine age-appropriate anticipatory guidance was provided. Annual wellness visit was performed and issues were addressed as identified. 2. Essential hypertension  Blood pressure remains elevated today. We are going to add amlodipine to his medication regimen. We will keep his Toprol all the same as his heart rate was 66. We will also keep his lisinopril at 40 mg daily. I do not want to raise this dose beyond 40. He does have problems with peripheral edema, we will consider hydrochlorothiazide. He was slightly on the dry side on his last metabolic profile. Strongly encouraged him to increase his water consumption. He is not water drinker typically. If we do utilize hydrochlorothiazide, we will need to significantly increase his water consumption. As always we recommend decrease sodium intake. Exercise was also recommended  - metoprolol tartrate (LOPRESSOR) 25 MG tablet; Take 1 tablet by mouth in the morning and 1 tablet before bedtime. Dispense: 180 tablet; Refill: 3  - amLODIPine (NORVASC) 5 MG tablet; Take 1 tablet by mouth in the morning. Dispense: 90 tablet; Refill: 3  He is following with cardiology next month. They will do a blood pressure check at that visit. He will also keep track of his blood pressures and let me know if this remains high. We did discuss normal and ideal blood pressure in the office today. 3. Mixed hyperlipidemia  LDL is excellently controlled. HDL is low. Recommend increasing exercise to get the HDL level up. We also are going to refill his Triglide. He will take this in the morning with his rosuvastatin at bedtime. We also encouraged him to initiate fish oil or Krill oil in order to further reduce his triglycerides which were over 300. He was fasting for that specimen. - fenofibrate (TRIGLIDE) 160 MG tablet; Take 1 tablet by mouth in the morning. Dispense: 90 tablet; Refill: 3    4.  Coronary artery disease involving native coronary artery of native heart without angina pectoris  Stable without any anginal symptoms. He has had 2 myocardial infarctions in the past.  He is [facility]. He is still taking his Brilinta. He had questions in this regard. We did discuss that there is ongoing benefit beyond a year however there is some degree of diminishing returns as we go further out past at 1 year shiv    5. Stage 3a chronic kidney disease ([facility] [address]  Renal insufficiency is mild. This is probably related to decreased water consumption to some degree as he did not appear to be somewhat dehydrated on exam.  Strongly encouraged him to drink more water. He is not on any nephrotoxic drugs. He has no microalbuminuria. We will follow renal function serially over time    6. Gastroesophageal reflux disease, unspecified whether esophagitis present  Acid reflux is excellently controlled on present medication regimen. Continue the same      No orders of the defined types were placed in this encounter. Return in about 6 months (around 1/26/2023) for 30.        This was an in-house visit

## 2022-07-27 ENCOUNTER — TELEPHONE (OUTPATIENT)
Dept: PRIMARY CARE CLINIC | Age: 57
End: 2022-07-27

## 2022-07-27 NOTE — TELEPHONE ENCOUNTER
----- Message from Lakia Mack DO sent at 7/26/2022  8:21 PM CDT -----  PSA is normal.  Thyroid is normal.  Lipids show significantly elevated triglycerides but LDL is in the normal range. HDL is low. Recommend increasing exercise to get this value up. Also recommend adding fish oil to the fibrate and Crestor to help lower triglycerides and raise HDL. Do appear to be slightly dehydrated on metabolic profile. Recommend increase water consumption  Your WBC, (infection fighting ability) Hgb and Hct, (oxygen carrying cells) are normal; as is your percentage of each cell type. There is no significant protein excretion in the urine.

## 2022-08-16 ENCOUNTER — OFFICE VISIT (OUTPATIENT)
Dept: CARDIOLOGY | Facility: CLINIC | Age: 57
End: 2022-08-16

## 2022-08-16 VITALS
HEART RATE: 68 BPM | RESPIRATION RATE: 18 BRPM | DIASTOLIC BLOOD PRESSURE: 82 MMHG | SYSTOLIC BLOOD PRESSURE: 130 MMHG | OXYGEN SATURATION: 98 % | BODY MASS INDEX: 34.91 KG/M2 | WEIGHT: 272 LBS | HEIGHT: 74 IN

## 2022-08-16 DIAGNOSIS — E66.09 CLASS 1 OBESITY DUE TO EXCESS CALORIES WITH SERIOUS COMORBIDITY AND BODY MASS INDEX (BMI) OF 34.0 TO 34.9 IN ADULT: Chronic | ICD-10-CM

## 2022-08-16 DIAGNOSIS — E78.5 HYPERLIPIDEMIA LDL GOAL <70: Chronic | ICD-10-CM

## 2022-08-16 DIAGNOSIS — I10 ESSENTIAL HYPERTENSION: Chronic | ICD-10-CM

## 2022-08-16 DIAGNOSIS — I25.2 HISTORY OF ST ELEVATION MYOCARDIAL INFARCTION (STEMI): Chronic | ICD-10-CM

## 2022-08-16 DIAGNOSIS — I25.10 CORONARY ARTERY DISEASE INVOLVING NATIVE CORONARY ARTERY OF NATIVE HEART WITHOUT ANGINA PECTORIS: Primary | Chronic | ICD-10-CM

## 2022-08-16 PROBLEM — E66.811 CLASS 1 OBESITY DUE TO EXCESS CALORIES WITH SERIOUS COMORBIDITY AND BODY MASS INDEX (BMI) OF 34.0 TO 34.9 IN ADULT: Chronic | Status: ACTIVE | Noted: 2021-02-22

## 2022-08-16 PROCEDURE — 99214 OFFICE O/P EST MOD 30 MIN: CPT | Performed by: NURSE PRACTITIONER

## 2022-08-16 RX ORDER — AMLODIPINE BESYLATE 5 MG/1
5 TABLET ORAL DAILY
COMMUNITY

## 2022-08-16 RX ORDER — ASCORBIC ACID 500 MG
1000 TABLET ORAL
COMMUNITY

## 2022-08-16 NOTE — PROGRESS NOTES
Subjective:     Encounter Date:08/16/2022      Patient ID: Eduardo Altman is a 57 y.o. male with known coronary artery disease, previous AMI 2016 and 2021, hypertension, hyperlipidemia, gastroesophageal reflux disease, and former smoker who presents to the office today for routine follow up.     Chief Complaint: CAD follow up   Coronary Artery Disease  Presents for follow-up visit. Symptoms include leg swelling. Pertinent negatives include no chest pain, chest pressure, chest tightness, dizziness, muscle weakness, palpitations, shortness of breath or weight gain. Risk factors include hyperlipidemia and obesity. The symptoms have been stable. Compliance with diet is good. Compliance with exercise is good. Compliance with medications is good.   Hypertension  This is a chronic problem. The current episode started more than 1 year ago. The problem has been resolved since onset. The problem is controlled. Associated symptoms include peripheral edema ( mild). Pertinent negatives include no chest pain, headaches, malaise/fatigue, orthopnea, palpitations, PND or shortness of breath. Risk factors for coronary artery disease include dyslipidemia and male gender. Current antihypertension treatment includes ACE inhibitors, beta blockers and calcium channel blockers. The current treatment provides significant improvement. Compliance problems include diet and exercise.  Hypertensive end-organ damage includes CAD/MI. There is no history of angina, kidney disease or heart failure.   Hyperlipidemia  This is a chronic problem. The current episode started more than 1 year ago. The problem is controlled. Recent lipid tests were reviewed and are low. Exacerbating diseases include obesity. He has no history of diabetes. Factors aggravating his hyperlipidemia include beta blockers and fatty foods. Pertinent negatives include no chest pain, focal weakness, myalgias or shortness of breath. Current antihyperlipidemic treatment includes  statins. The current treatment provides significant improvement of lipids. Compliance problems include medication side effects (rash).  Risk factors for coronary artery disease include dyslipidemia, male sex, hypertension and obesity.     Mr. Altman presents to the office today for routine follow up.  He has been doing well since his last visit.  He denies any chest pain, chest pressure, chest tightness.  He reports no shortness of breath, dyspnea on exertion, orthopnea, PND.  His lipids show good control of his LDL with elevated triglycerides.  He admits to poor dietary choices with limited exercise.  He was previously intolerant to atorvastatin but tolerating rosuvastatin at this time.  His blood pressure was uncontrolled at his last visit with us and increased dose of his lisinopril was prescribed.  Since then, the patient has seen his PCP provider who added amlodipine.  His blood pressure is well controlled currently and he reports no side effects from the new medication.  He previously has noted mild edema to his ankles which is unchanged.  He does not had any new complaints.  He is on low-dose Brilinta and aspirin without bleeding issues.      The following portions of the patient's history were reviewed and updated as appropriate: allergies, current medications, past family history, past medical history, past social history and past surgical history.     Allergies   Allergen Reactions   • Atorvastatin Itching       Current Outpatient Medications:   •  amLODIPine (NORVASC) 5 MG tablet, Take 5 mg by mouth Daily., Disp: , Rfl:   •  ascorbic acid (VITAMIN C) 500 MG tablet, Take 1,000 mg by mouth., Disp: , Rfl:   •  aspirin 81 MG chewable tablet, Chew 81 mg Daily., Disp: , Rfl:   •  Cholecalciferol (VITAMIN D3 PO), Take  by mouth., Disp: , Rfl:   •  fenofibrate 160 MG tablet, Take 160 mg by mouth Daily., Disp: , Rfl:   •  lisinopril (PRINIVIL,ZESTRIL) 40 MG tablet, Take 1 tablet by mouth Daily., Disp: 90 tablet,  "Rfl: 3  •  metoprolol tartrate (LOPRESSOR) 25 MG tablet, TAKE 1 TABLET EVERY 12 HOURS, Disp: 180 tablet, Rfl: 3  •  nitroglycerin (NITROSTAT) 0.4 MG SL tablet, Place 1 tablet under the tongue Every 5 (Five) Minutes As Needed for Chest Pain. Take no more than 3 doses in 15 minutes., Disp: 25 tablet, Rfl: 1  •  pantoprazole (PROTONIX) 40 MG EC tablet, Take 40 mg by mouth Daily., Disp: , Rfl:   •  rosuvastatin (CRESTOR) 10 MG tablet, TAKE 1 TABLET DAILY, Disp: 30 tablet, Rfl: 11  •  ticagrelor (BRILINTA) 60 MG tablet tablet, Take 1 tablet by mouth 2 (Two) Times a Day., Disp: 180 tablet, Rfl: 3    Review of Systems   Constitutional: Negative for malaise/fatigue, weight gain and weight loss.   Cardiovascular: Positive for leg swelling. Negative for chest pain, dyspnea on exertion, orthopnea, palpitations and paroxysmal nocturnal dyspnea.   Respiratory: Negative for chest tightness, cough and shortness of breath.    Hematologic/Lymphatic: Negative for bleeding problem. Bruises/bleeds easily.   Musculoskeletal: Negative for muscle weakness and myalgias.   Gastrointestinal: Negative for bloating and nausea.   Neurological: Negative for dizziness, focal weakness, headaches and light-headedness.   Psychiatric/Behavioral: Negative for altered mental status.       Procedures  /82 (BP Location: Right arm, Patient Position: Sitting, Cuff Size: Adult)   Pulse 68   Resp 18   Ht 188 cm (74\")   Wt 123 kg (272 lb)   SpO2 98%   BMI 34.92 kg/m²        Objective:     Vitals reviewed.   Constitutional:       General: Not in acute distress.     Appearance: Healthy appearance. Well-developed, well-groomed and not in distress. Not diaphoretic.   Eyes:      General:         Right eye: No discharge.         Left eye: No discharge.   HENT:      Head: Normocephalic and atraumatic.    Mouth/Throat:      Pharynx: No oropharyngeal exudate.   Pulmonary:      Effort: Pulmonary effort is normal. No respiratory distress.      Breath sounds: " Normal breath sounds. No wheezing. No rhonchi. No rales.   Chest:      Chest wall: Not tender to palpatation.   Cardiovascular:      Normal rate. Regular rhythm.      Murmurs: There is no murmur.      No gallop. No rub.   Edema:     Peripheral edema absent.   Abdominal:      General: There is no distension.      Palpations: Abdomen is soft.      Tenderness: There is no abdominal tenderness.   Musculoskeletal: Normal range of motion.      Cervical back: Normal range of motion and neck supple. Skin:     General: Skin is warm and dry.      Coloration: Skin is not pale.      Findings: No erythema or rash.   Neurological:      Mental Status: Alert, oriented to person, place, and time and oriented to person, place and time.   Psychiatric:         Mood and Affect: Mood normal.         Speech: Speech normal.         Behavior: Behavior normal. Behavior is cooperative.         Thought Content: Thought content normal.         Cognition and Memory: Cognition normal.         Judgment: Judgment normal.       Lab Review:   Lab Results   Component Value Date    CHOL 129 02/01/2021    CHLPL 114 (L) 07/21/2022    TRIG 338 (H) 07/21/2022    HDL 18 (L) 07/21/2022    LDL 28 07/21/2022     Results for orders placed during the hospital encounter of 02/01/21    Adult Transthoracic Echo Complete W/ Cont if Necessary Per Protocol    Interpretation Summary  · Left ventricular wall thickness is consistent with concentric hypertrophy.  · The following left ventricular wall segments are hypokinetic: apical lateral, apical septal and apex hypokinetic.  · Estimated left ventricular EF = 50% Left ventricular systolic function is normal.  · Left ventricular diastolic function is consistent with (grade I) impaired relaxation.    Cardiac catheterization 2/1/2021  Impression:  1.  Acute anterior STEMI, secondary to proximal LAD occlusion, just proximal to previously placed stent.  2.  Successful PCI to the proximal mid left anterior descending artery  as outlined above.  3.  Severe stenosis in the first obtuse marginal branch, left to be medically managed at this time.      Assessment:          Diagnosis Plan   1. Coronary artery disease involving native coronary artery of native heart without angina pectoris     2. Essential hypertension     3. Hyperlipidemia LDL goal <70     4. History of ST elevation myocardial infarction (STEMI)     5. Class 1 obesity due to excess calories with serious comorbidity and body mass index (BMI) of 34.0 to 34.9 in adult            Plan:       - Coronary artery disease: Stable. History of CAD with previous AMI in 2016 and more recently in February 2021.  Currently on dual antiplatelet therapy with aspirin and low-dose Brilinta.  He remains on beta blocker and statin therapies.  He is feeling well without complaints.    - Hypertension: adjusted lisinopril at last office visit due to elevated blood pressure at that visit.  Since that time his PCP has added amlodipine.  He has had significant improvement in his blood pressure.  Continue current medications    - Hyperlipidemia: LDL goal < 70 given known CAD. Repeat lipids recently with well controlled LDL. Continue use of crestor.    - ST elevation myocardial infarction: Anterior STEMI February 2021 with PCI per Dr. Jeffrey.  Xience Miguelina drug-eluting stent 3.0 x 23 mm Xience Miguelina drug-eluting stent 2.75 x 23 mm, both to the LAD.    - Obesity: BMI 34.92. Diet and exercise recommended.          Continue current medications  Follow up in 1 year, sooner if needed.          I attest that all portions of this note have been reviewed and updated to reflect the patient's current status.

## 2022-12-06 DIAGNOSIS — I10 ESSENTIAL HYPERTENSION: ICD-10-CM

## 2022-12-06 RX ORDER — TICAGRELOR 60 MG/1
TABLET ORAL
Qty: 180 TABLET | Refills: 3 | Status: SHIPPED | OUTPATIENT
Start: 2022-12-06

## 2023-02-04 DIAGNOSIS — I10 ESSENTIAL HYPERTENSION: ICD-10-CM

## 2023-02-06 RX ORDER — LISINOPRIL 40 MG/1
TABLET ORAL
Qty: 90 TABLET | Refills: 3 | Status: SHIPPED | OUTPATIENT
Start: 2023-02-06

## 2023-03-27 DIAGNOSIS — K21.9 GASTROESOPHAGEAL REFLUX DISEASE, UNSPECIFIED WHETHER ESOPHAGITIS PRESENT: ICD-10-CM

## 2023-03-28 RX ORDER — PANTOPRAZOLE SODIUM 40 MG/1
40 TABLET, DELAYED RELEASE ORAL DAILY
Qty: 90 TABLET | Refills: 3 | Status: SHIPPED | OUTPATIENT
Start: 2023-03-28

## 2023-03-28 NOTE — TELEPHONE ENCOUNTER
Received fax from pharmacy requesting refill on pts medication(s). Pt was last seen in office on Visit date not found  and has a follow up scheduled for Visit date not found. Will send request to  Dr. Raymundo Ibarra  for authorization.      Requested Prescriptions     Pending Prescriptions Disp Refills    pantoprazole (PROTONIX) 40 MG tablet [Pharmacy Med Name: PANTOPRAZOLE SODIUM DR TABS 40MG] 90 tablet 3     Sig: TAKE 1 TABLET DAILY

## 2023-07-12 DIAGNOSIS — I10 ESSENTIAL HYPERTENSION: ICD-10-CM

## 2023-07-13 RX ORDER — AMLODIPINE BESYLATE 5 MG/1
5 TABLET ORAL DAILY
Qty: 30 TABLET | Refills: 0 | Status: SHIPPED | OUTPATIENT
Start: 2023-07-13

## 2023-07-13 NOTE — TELEPHONE ENCOUNTER
Received fax from pharmacy requesting refill on pts medication(s). Pt was last seen in office on 07/26/2022  and has a follow up scheduled for Visit date not found. Will send request to  Dr. Rut Dela Cruz  for authorization.      Left voicemail for patient to schedule appointment    Requested Prescriptions     Pending Prescriptions Disp Refills    amLODIPine (NORVASC) 5 MG tablet [Pharmacy Med Name: AMLODIPINE BESYLATE TABS 5MG] 30 tablet 0     Sig: Take 1 tablet by mouth daily

## 2023-08-09 ENCOUNTER — TELEPHONE (OUTPATIENT)
Dept: FAMILY MEDICINE CLINIC | Age: 58
End: 2023-08-09

## 2023-08-09 DIAGNOSIS — E78.2 MIXED HYPERLIPIDEMIA: ICD-10-CM

## 2023-08-09 DIAGNOSIS — I10 ESSENTIAL HYPERTENSION: ICD-10-CM

## 2023-08-09 NOTE — TELEPHONE ENCOUNTER
There is no blood test as a screening tool for cancer.   There are specific test that we used to follow specific cancers but no general test

## 2023-08-09 NOTE — TELEPHONE ENCOUNTER
----- Message from Pepito Humphries sent at 8/9/2023  1:59 PM CDT -----  Subject: Referral Request    Reason for referral request? Labs  Provider patient wants to be referred to(if known):     Provider Phone Number(if known): Additional Information for Provider?  Patient is asking if there is a blood   test to screen for cancer, if so he would like that too if covered and if   not please call to advise.  ---------------------------------------------------------------------------  --------------  [address]    [phone]; OK to leave message on voicemail  ---------------------------------------------------------------------------  --------------

## 2023-08-10 RX ORDER — FENOFIBRATE 160 MG/1
160 TABLET ORAL DAILY
Qty: 90 TABLET | Refills: 3 | Status: SHIPPED | OUTPATIENT
Start: 2023-08-10

## 2023-08-11 DIAGNOSIS — I10 ESSENTIAL HYPERTENSION: ICD-10-CM

## 2023-08-11 RX ORDER — AMLODIPINE BESYLATE 5 MG/1
5 TABLET ORAL DAILY
Qty: 90 TABLET | Refills: 3 | Status: SHIPPED | OUTPATIENT
Start: 2023-08-11

## 2023-08-11 NOTE — TELEPHONE ENCOUNTER
Received call/My Chart Message from patient requesting refill on medication(s). Pt was last seen in office on 7/2022 and has a follow up scheduled for 9/22/2023. Will send request to provider for authorization.      Requested Prescriptions     Pending Prescriptions Disp Refills    amLODIPine (NORVASC) 5 MG tablet 90 tablet 0     Sig: Take 1 tablet by mouth daily

## 2023-08-21 DIAGNOSIS — K21.9 GASTROESOPHAGEAL REFLUX DISEASE, UNSPECIFIED WHETHER ESOPHAGITIS PRESENT: ICD-10-CM

## 2023-08-21 DIAGNOSIS — I25.10 CORONARY ARTERY DISEASE INVOLVING NATIVE HEART WITHOUT ANGINA PECTORIS, UNSPECIFIED VESSEL OR LESION TYPE: ICD-10-CM

## 2023-08-21 DIAGNOSIS — E78.2 MIXED HYPERLIPIDEMIA: ICD-10-CM

## 2023-08-21 DIAGNOSIS — I10 ESSENTIAL HYPERTENSION: Primary | ICD-10-CM

## 2023-08-21 DIAGNOSIS — I10 ESSENTIAL HYPERTENSION: ICD-10-CM

## 2023-08-21 LAB
ALBUMIN SERPL-MCNC: 4.6 G/DL (ref 3.5–5.2)
ALP SERPL-CCNC: 57 U/L (ref 40–130)
ALT SERPL-CCNC: 68 U/L (ref 5–41)
ANION GAP SERPL CALCULATED.3IONS-SCNC: 11 MMOL/L (ref 7–19)
AST SERPL-CCNC: 74 U/L (ref 5–40)
BASOPHILS # BLD: 0 K/UL (ref 0–0.2)
BASOPHILS NFR BLD: 0.6 % (ref 0–1)
BILIRUB SERPL-MCNC: 0.5 MG/DL (ref 0.2–1.2)
BUN SERPL-MCNC: 13 MG/DL (ref 8–23)
CALCIUM SERPL-MCNC: 9.5 MG/DL (ref 8.8–10.2)
CHLORIDE SERPL-SCNC: 104 MMOL/L (ref 98–111)
CHOLEST SERPL-MCNC: 104 MG/DL (ref 160–199)
CO2 SERPL-SCNC: 25 MMOL/L (ref 22–29)
CREAT SERPL-MCNC: 1.1 MG/DL (ref 0.5–1.2)
CREAT UR-MCNC: 224.5 MG/DL (ref 39–259)
EOSINOPHIL # BLD: 0.3 K/UL (ref 0–0.6)
EOSINOPHIL NFR BLD: 4.4 % (ref 0–5)
ERYTHROCYTE [DISTWIDTH] IN BLOOD BY AUTOMATED COUNT: 13.4 % (ref 11.5–14.5)
GLUCOSE SERPL-MCNC: 116 MG/DL (ref 74–109)
HCT VFR BLD AUTO: 45.8 % (ref 42–52)
HCV AB SERPL QL IA: NORMAL
HDLC SERPL-MCNC: 19 MG/DL (ref 55–121)
HGB BLD-MCNC: 14.8 G/DL (ref 14–18)
HIV-1 P24 AG: NORMAL
HIV1+2 AB SERPLBLD QL IA.RAPID: NORMAL
IMM GRANULOCYTES # BLD: 0 K/UL
LDLC SERPL CALC-MCNC: 46 MG/DL
LYMPHOCYTES # BLD: 2.1 K/UL (ref 1.1–4.5)
LYMPHOCYTES NFR BLD: 29.8 % (ref 20–40)
MCH RBC QN AUTO: 27.7 PG (ref 27–31)
MCHC RBC AUTO-ENTMCNC: 32.3 G/DL (ref 33–37)
MCV RBC AUTO: 85.8 FL (ref 80–94)
MICROALBUMIN UR-MCNC: <1.2 MG/DL (ref 0–19)
MICROALBUMIN/CREAT UR-RTO: NORMAL MG/G
MONOCYTES # BLD: 0.4 K/UL (ref 0–0.9)
MONOCYTES NFR BLD: 6 % (ref 0–10)
NEUTROPHILS # BLD: 4.1 K/UL (ref 1.5–7.5)
NEUTS SEG NFR BLD: 58.6 % (ref 50–65)
PLATELET # BLD AUTO: 269 K/UL (ref 130–400)
PMV BLD AUTO: 10.2 FL (ref 9.4–12.4)
POTASSIUM SERPL-SCNC: 4.6 MMOL/L (ref 3.5–5)
PROT SERPL-MCNC: 7.3 G/DL (ref 6.6–8.7)
PSA SERPL-MCNC: 0.62 NG/ML (ref 0–4)
RBC # BLD AUTO: 5.34 M/UL (ref 4.7–6.1)
SODIUM SERPL-SCNC: 140 MMOL/L (ref 136–145)
T4 FREE SERPL-MCNC: 1.19 NG/DL (ref 0.93–1.7)
TRIGL SERPL-MCNC: 195 MG/DL (ref 0–149)
TSH SERPL DL<=0.005 MIU/L-ACNC: 1.69 UIU/ML (ref 0.27–4.2)
WBC # BLD AUTO: 6.9 K/UL (ref 4.8–10.8)

## 2023-08-23 ENCOUNTER — TELEPHONE (OUTPATIENT)
Dept: FAMILY MEDICINE CLINIC | Age: 58
End: 2023-08-23

## 2023-08-23 DIAGNOSIS — R74.8 ELEVATED LIVER ENZYMES: Primary | ICD-10-CM

## 2023-08-23 NOTE — TELEPHONE ENCOUNTER
----- Message from [employer],  sent at 8/22/2023  7:16 PM CDT -----  Your metabolic profile is normal.  This includes kidney function as well as electrolytes. Liver enzymes are significantly elevated as they have been in the past, but to a greater degree   Recommend ultrasound of the liver. Also recommend \"B0LP\"    Lipids are excellently controlled. Your WBC, (infection fighting ability) Hgb and Hct, (oxygen carrying cells) are normal; as is your percentage of each cell type. HIV and hepatitis C are nonreactive, meaning normal.  There is no significant protein excretion in the urine.   PSA is normal.  Thyroid is normal.

## 2023-08-25 ENCOUNTER — OFFICE VISIT (OUTPATIENT)
Dept: CARDIOLOGY | Facility: CLINIC | Age: 58
End: 2023-08-25
Payer: OTHER GOVERNMENT

## 2023-08-25 VITALS
HEART RATE: 74 BPM | SYSTOLIC BLOOD PRESSURE: 150 MMHG | OXYGEN SATURATION: 99 % | BODY MASS INDEX: 35.55 KG/M2 | HEIGHT: 74 IN | DIASTOLIC BLOOD PRESSURE: 95 MMHG | WEIGHT: 277 LBS

## 2023-08-25 DIAGNOSIS — I25.2 HISTORY OF ST ELEVATION MYOCARDIAL INFARCTION (STEMI): Chronic | ICD-10-CM

## 2023-08-25 DIAGNOSIS — I25.10 CORONARY ARTERY DISEASE INVOLVING NATIVE CORONARY ARTERY OF NATIVE HEART WITHOUT ANGINA PECTORIS: Primary | Chronic | ICD-10-CM

## 2023-08-25 DIAGNOSIS — E78.5 HYPERLIPIDEMIA LDL GOAL <70: Chronic | ICD-10-CM

## 2023-08-25 DIAGNOSIS — I10 ESSENTIAL HYPERTENSION: Chronic | ICD-10-CM

## 2023-08-25 DIAGNOSIS — E66.09 CLASS 1 OBESITY DUE TO EXCESS CALORIES WITH SERIOUS COMORBIDITY AND BODY MASS INDEX (BMI) OF 34.0 TO 34.9 IN ADULT: Chronic | ICD-10-CM

## 2023-08-25 PROBLEM — E66.01 CLASS 2 SEVERE OBESITY DUE TO EXCESS CALORIES WITH SERIOUS COMORBIDITY AND BODY MASS INDEX (BMI) OF 35.0 TO 35.9 IN ADULT: Status: ACTIVE | Noted: 2021-02-22

## 2023-08-25 PROBLEM — E66.812 CLASS 2 SEVERE OBESITY DUE TO EXCESS CALORIES WITH SERIOUS COMORBIDITY AND BODY MASS INDEX (BMI) OF 35.0 TO 35.9 IN ADULT: Status: ACTIVE | Noted: 2021-02-22

## 2023-08-25 PROCEDURE — 93000 ELECTROCARDIOGRAM COMPLETE: CPT | Performed by: NURSE PRACTITIONER

## 2023-08-25 PROCEDURE — 99214 OFFICE O/P EST MOD 30 MIN: CPT | Performed by: NURSE PRACTITIONER

## 2023-08-25 NOTE — PROGRESS NOTES
Subjective:     Encounter Date:08/25/2023      Patient ID: Eduardo Altman is a 58 y.o. male with known coronary artery disease, previous AMI 2016 and 2021, hypertension, hyperlipidemia, gastroesophageal reflux disease, and former smoker who presents to the office today for routine follow up.     Chief Complaint: Routine CAD follow up   Coronary Artery Disease  Presents for follow-up visit. Symptoms include leg swelling (bilateral mild ankle swelling). Pertinent negatives include no chest pain, chest pressure, chest tightness, dizziness, muscle weakness, palpitations, shortness of breath or weight gain. Risk factors include hyperlipidemia and obesity. The symptoms have been stable. Compliance with diet is good. Compliance with exercise is good. Compliance with medications is good.   Hypertension  This is a chronic problem. The current episode started more than 1 year ago. The problem has been resolved since onset. The problem is controlled. Associated symptoms include peripheral edema ( mild). Pertinent negatives include no chest pain, headaches, malaise/fatigue, orthopnea, palpitations, PND or shortness of breath. Risk factors for coronary artery disease include dyslipidemia, male gender and obesity. Current antihypertension treatment includes ACE inhibitors, beta blockers and calcium channel blockers. The current treatment provides significant improvement. Compliance problems include diet and exercise.  Hypertensive end-organ damage includes CAD/MI. There is no history of angina, kidney disease or heart failure.   Hyperlipidemia  This is a chronic problem. The current episode started more than 1 year ago. The problem is controlled. Recent lipid tests were reviewed and are low. Exacerbating diseases include obesity. He has no history of diabetes. Factors aggravating his hyperlipidemia include beta blockers and fatty foods. Pertinent negatives include no chest pain, focal weakness, myalgias or shortness of breath.  Current antihyperlipidemic treatment includes statins. The current treatment provides significant improvement of lipids. Compliance problems include medication side effects (rash).  Risk factors for coronary artery disease include dyslipidemia, male sex, hypertension and obesity.     Mr. Altman presents to the office today for routine follow up.  He has been doing well since his last visit.  He denies any chest pain, chest pressure, chest tightness.  He describes shortness of breath with activities or moderate exertion, and resolves with rest. He denies orthopnea PND. He has had no abdominal bloating or rapid weight gain but does report a 50 pound weight gain in 2 years due to lifestyle.  He does not routinely exercise and does not eat as well as he should per his own report.  His PCP has recently checked some lab work and he has mildly elevated LFT. He is due for liver ultrasound. He denies chronic alcohol use. He is on low dose statin therapy. He describes constant ankle swelling consistent with markings from his socks. No lower leg edema. No significant fluid retention.  He is managed on amlodipine which was added by his PCP for BP control over 1 year ago.      He reports that his BP is generally well controlled. He monitors this at home from time to time and notes that his readings are within normal limits.  He is compliant with his home medications. He does admit that he could improve daily exercise and diet choices.     The following portions of the patient's history were reviewed and updated as appropriate: allergies, current medications, past family history, past medical history, past social history and past surgical history.     Allergies   Allergen Reactions    Atorvastatin Itching       Current Outpatient Medications:     amLODIPine (NORVASC) 5 MG tablet, Take 1 tablet by mouth Daily., Disp: , Rfl:     ascorbic acid (VITAMIN C) 500 MG tablet, Take 2 tablets by mouth., Disp: , Rfl:     aspirin 81 MG chewable  tablet, Chew 1 tablet Daily., Disp: , Rfl:     Brilinta 60 MG tablet tablet, TAKE 1 TABLET TWICE A DAY, Disp: 180 tablet, Rfl: 3    Cholecalciferol (VITAMIN D3 PO), Take  by mouth., Disp: , Rfl:     fenofibrate 160 MG tablet, Take 1 tablet by mouth Daily., Disp: , Rfl:     lisinopril (PRINIVIL,ZESTRIL) 40 MG tablet, TAKE 1 TABLET DAILY, Disp: 90 tablet, Rfl: 3    metoprolol tartrate (LOPRESSOR) 25 MG tablet, TAKE 1 TABLET EVERY 12 HOURS, Disp: 180 tablet, Rfl: 3    nitroglycerin (NITROSTAT) 0.4 MG SL tablet, Place 1 tablet under the tongue Every 5 (Five) Minutes As Needed for Chest Pain. Take no more than 3 doses in 15 minutes., Disp: 25 tablet, Rfl: 1    pantoprazole (PROTONIX) 40 MG EC tablet, Take 1 tablet by mouth Daily., Disp: , Rfl:     rosuvastatin (CRESTOR) 10 MG tablet, TAKE 1 TABLET DAILY, Disp: 30 tablet, Rfl: 11    Review of Systems   Constitutional: Negative for malaise/fatigue, weight gain and weight loss.   Cardiovascular:  Positive for leg swelling (bilateral mild ankle swelling). Negative for chest pain, dyspnea on exertion, orthopnea, palpitations and paroxysmal nocturnal dyspnea.   Respiratory:  Negative for chest tightness, cough and shortness of breath.    Hematologic/Lymphatic: Negative for bleeding problem. Bruises/bleeds easily.   Musculoskeletal:  Negative for muscle weakness and myalgias.   Gastrointestinal:  Negative for bloating and nausea.   Neurological:  Negative for dizziness, focal weakness, headaches and light-headedness.   Psychiatric/Behavioral:  Negative for altered mental status.        ECG 12 Lead    Date/Time: 8/25/2023 9:32 AM  Performed by: Pamela Maxwell APRN  Authorized by: Pamela Maxwell APRN   Comparison: compared with previous ECG from 2/16/2022  Similar to previous ECG  Rhythm: sinus rhythm  Rate: normal  BPM: 69  Conduction: conduction normal  Q waves: V2 and V3    QRS axis: normal  Other findings: left atrial abnormality    Clinical impression: abnormal  "EKG        /95 (BP Location: Left arm, Patient Position: Sitting, Cuff Size: Large Adult)   Pulse 74   Ht 188 cm (74\")   Wt 126 kg (277 lb)   SpO2 99%   BMI 35.56 kg/mý        Objective:     Vitals reviewed.   Constitutional:       General: Awake. Not in acute distress.     Appearance: Normal and healthy appearance. Well-developed, well-groomed and not in distress. Obese. Not diaphoretic.   Eyes:      General:         Right eye: No discharge.         Left eye: No discharge.   HENT:      Head: Normocephalic and atraumatic.    Mouth/Throat:      Pharynx: No oropharyngeal exudate.   Pulmonary:      Effort: Pulmonary effort is normal. No respiratory distress.      Breath sounds: Normal breath sounds. No wheezing. No rhonchi. No rales.   Cardiovascular:      Normal rate. Regular rhythm.      Murmurs: There is no murmur.      No gallop.  No rub.   Edema:     Peripheral edema absent.   Musculoskeletal: Normal range of motion.      Cervical back: Normal range of motion and neck supple. Skin:     General: Skin is warm and dry.      Coloration: Skin is not pale.      Findings: No erythema or rash.   Neurological:      Mental Status: Alert, oriented to person, place, and time and oriented to person, place and time.   Psychiatric:         Attention and Perception: Attention normal.         Mood and Affect: Mood normal.         Speech: Speech normal.         Behavior: Behavior normal. Behavior is cooperative.         Thought Content: Thought content normal.         Cognition and Memory: Cognition normal.         Judgment: Judgment normal.     Lab Review:     Lab Results   Component Value Date    GLUCOSE 83 07/21/2022    BUN 22 (H) 07/21/2022    CREATININE 1.4 (H) 07/21/2022    BCR 17.4 02/02/2021    K 4.4 07/21/2022    CO2 25 07/21/2022    CALCIUM 9.6 07/21/2022    ALBUMIN 4.7 07/21/2022    BILITOT 0.5 07/21/2022    AST 35 07/21/2022    ALT 43 (H) 07/21/2022     Lab Results   Component Value Date    CHOL 129 " 02/01/2021    CHLPL 104 (L) 08/21/2023    TRIG 195 (H) 08/21/2023    HDL 19 (L) 08/21/2023    LDL 46 08/21/2023     Results for orders placed during the hospital encounter of 02/01/21    Adult Transthoracic Echo Complete W/ Cont if Necessary Per Protocol    Interpretation Summary  ú Left ventricular wall thickness is consistent with concentric hypertrophy.  ú The following left ventricular wall segments are hypokinetic: apical lateral, apical septal and apex hypokinetic.  ú Estimated left ventricular EF = 50% Left ventricular systolic function is normal.  ú Left ventricular diastolic function is consistent with (grade I) impaired relaxation.    Cardiac catheterization 2/1/2021  Impression:  1.  Acute anterior STEMI, secondary to proximal LAD occlusion, just proximal to previously placed stent.  2.  Successful PCI to the proximal mid left anterior descending artery as outlined above.  3.  Severe stenosis in the first obtuse marginal branch, left to be medically managed at this time.      Assessment:          Diagnosis Plan   1. Coronary artery disease involving native coronary artery of native heart without angina pectoris        2. Essential hypertension        3. Hyperlipidemia LDL goal <70        4. History of ST elevation myocardial infarction (STEMI)        5. Class 1 obesity due to excess calories with serious comorbidity and body mass index (BMI) of 34.0 to 34.9 in adult               Plan:       - Coronary artery disease: Stable. History of CAD with previous AMI in 2016 and more recently in February 2021.  Currently on dual antiplatelet therapy with aspirin and low-dose Brilinta.  He remains on beta blocker and statin therapies.  He is feeling well without complaints.    - Hypertension: Continue on home medications for blood pressure control. He endorses a well controlled BP at home - he denies any symptoms today. Recheck of BP in office remains elevated. He has been asked to monitor BP at home and ensure  that his BP is within normal limits, < 140/90.  Continue lisinopril, Lopressor, and amlodipine. Consider transition to coreg if BP is elevated or lower leg edema becomes worse with amlodipine as this may be the culprit.     - Hyperlipidemia: LDL goal < 70 given known CAD. Continue use of crestor. LDL well controlled. AST/ALT mildly elevated, PCP monitoring.     - ST elevation myocardial infarction: Anterior STEMI February 2021 with PCI per Dr. Jeffrey.  Xience Miguelina drug-eluting stent 3.0 x 23 mm Xience Miguelina drug-eluting stent 2.75 x 23 mm, both to the LAD.    - Obesity: BMI 35.56. Diet and exercise recommended.          Continue current medications.  Monitor BP.  Diet and exercise changes for weight loss.  Follow up in 1 year, sooner if needed.          I attest that all portions of this note have been reviewed and updated to reflect the patient's current status.

## 2023-08-31 ENCOUNTER — HOSPITAL ENCOUNTER (OUTPATIENT)
Dept: GENERAL RADIOLOGY | Age: 58
Discharge: HOME | End: 2023-08-31
Payer: OTHER GOVERNMENT

## 2023-08-31 DIAGNOSIS — R74.8 ELEVATED LIVER ENZYMES: ICD-10-CM

## 2023-08-31 PROCEDURE — 76705 ECHO EXAM OF ABDOMEN: CPT

## 2023-09-05 ENCOUNTER — TELEPHONE (OUTPATIENT)
Dept: FAMILY MEDICINE CLINIC | Age: 58
End: 2023-09-05

## 2023-09-05 DIAGNOSIS — R93.2 ABNORMAL LIVER ULTRASOUND: Primary | ICD-10-CM

## 2023-09-05 DIAGNOSIS — K76.9 PARENCHYMAL LIVER DISEASE: ICD-10-CM

## 2023-09-05 NOTE — TELEPHONE ENCOUNTER
----- Message from [employer], DO sent at 9/4/2023  8:12 AM CDT -----  Ultrasound shows some parenchymal liver disease. We do need to get that additional blood work. I would also like to do a liver spleen CT scan with contrast to further evaluate the liver. We can also put in referral to gastroenterology following completion of the tests. This will be important as we may need to do a liver biopsy to fully understand what is going on with your liver. If there are some cirrhotic changes, we need to stop what ever process is causing it.

## 2023-09-05 NOTE — TELEPHONE ENCOUNTER
Called patient, spoke with: Patient regarding the results of the patients most recent labs. I advised Patient of Dr. Rut Dela Cruz recommendations.    Patient did voice understanding      Pt aware CT ordered

## 2023-09-07 DIAGNOSIS — R74.8 ELEVATED LIVER ENZYMES: ICD-10-CM

## 2023-09-07 LAB
APTT PPP: 30.3 SEC (ref 26–36.2)
CRP SERPL HS-MCNC: 0.34 MG/DL (ref 0–0.5)
ERYTHROCYTE [SEDIMENTATION RATE] IN BLOOD BY WESTERGREN METHOD: 2 MM/HR (ref 0–15)
ETHANOLAMINE SERPL-MCNC: <10 MG/DL (ref 0–0.08)
FERRITIN SERPL-MCNC: 178 NG/ML (ref 30–400)
HAV IGM SERPL QL IA: NORMAL
HBV SURFACE AB SERPL IA-ACNC: NORMAL M[IU]/ML
HBV SURFACE AG SERPL QL IA: NORMAL
HCV AB SERPL QL IA: NORMAL
INR PPP: 1.09 (ref 0.88–1.18)
IRON SERPL-MCNC: 92 UG/DL (ref 59–158)
PROTHROMBIN TIME: 13.8 SEC (ref 12–14.6)

## 2023-09-08 ENCOUNTER — TELEPHONE (OUTPATIENT)
Dept: FAMILY MEDICINE CLINIC | Age: 58
End: 2023-09-08

## 2023-09-08 LAB
A1AT SERPL-MCNC: 156 MG/DL (ref 90–200)
HAV AB SER QL IA: POSITIVE
HBV CORE AB SERPL QL IA: NEGATIVE

## 2023-09-08 NOTE — TELEPHONE ENCOUNTER
----- Message from Justyna Muse DO sent at 9/8/2023  7:54 AM CDT -----  Inflammatory markers are normal.  Hepatitis profile is normal.  Iron levels are normal.  Coagulation levels are all normal.  Additional labs are still pending

## 2023-09-08 NOTE — TELEPHONE ENCOUNTER
Called patient, spoke with: Patient regarding the results of the patients most recent labs. I advised Patient of Dr. Neela Keane recommendations.    Patient did voice understanding

## 2023-09-09 LAB
COPPER SERPL-MCNC: 102.8 UG/DL (ref 70–140)
NUCLEAR IGG SER QL IA: NORMAL

## 2023-09-12 LAB
CERULOPLASMIN SERPL-MCNC: 20 MG/DL (ref 15–30)
COLLECT DURATION TIME SPEC: ABNORMAL H
COPPER 24H UR-MRATE: ABNORMAL UG/D (ref 3–45)
COPPER ?TM UR-MCNC: 1.2 UG/DL
COPPER/CREAT 24H UR: 9.1 UG/G CRT (ref 10–45)
CREAT 24H UR-MCNC: 132 MG/DL
CREAT 24H UR-MRATE: ABNORMAL MG/D (ref 800–2100)
SPECIMEN VOL ?TM UR: 55 ML

## 2023-09-13 ENCOUNTER — TELEPHONE (OUTPATIENT)
Dept: FAMILY MEDICINE CLINIC | Age: 58
End: 2023-09-13

## 2023-09-13 ENCOUNTER — HOSPITAL ENCOUNTER (OUTPATIENT)
Dept: GENERAL RADIOLOGY | Age: 58
Discharge: HOME | End: 2023-09-13
Payer: OTHER GOVERNMENT

## 2023-09-13 DIAGNOSIS — R93.2 ABNORMAL LIVER ULTRASOUND: ICD-10-CM

## 2023-09-13 DIAGNOSIS — K76.9 PARENCHYMAL LIVER DISEASE: ICD-10-CM

## 2023-09-13 PROCEDURE — 74160 CT ABDOMEN W/CONTRAST: CPT

## 2023-09-13 PROCEDURE — 6360000004 HC RX CONTRAST MEDICATION: Performed by: PEDIATRICS

## 2023-09-13 RX ADMIN — IOPAMIDOL 75 ML: 755 INJECTION, SOLUTION INTRAVENOUS at 12:15

## 2023-09-13 NOTE — TELEPHONE ENCOUNTER
----- Message from [employer], DO sent at 9/12/2023  8:12 PM CDT -----  Urinary copper is normal.  Ceruloplasmin is normal.

## 2023-09-13 NOTE — TELEPHONE ENCOUNTER
Called patient, spoke with: Patient regarding the results of the patients most recent labs. I advised Patient of Claude Golds recommendations.    Patient did voice understanding

## 2023-09-13 NOTE — TELEPHONE ENCOUNTER
----- Message from [employer] sent at 9/11/2023  7:24 AM CDT -----  You have antibodies to hepatitis A meaning you have either been vaccinated or had hepatitis A in the past.  Copper is normal.  CIARA is negative  Alpha-1 antitrypsin is normal.  Hepatitis B core antibody is negative

## 2023-09-13 NOTE — TELEPHONE ENCOUNTER
Called patient, spoke with: Patient regarding the results of the patients most recent labs. I advised Patient of Jacqueline Lunger recommendations.    Patient did voice understanding

## 2023-09-14 ENCOUNTER — TELEPHONE (OUTPATIENT)
Dept: FAMILY MEDICINE CLINIC | Age: 58
End: 2023-09-14

## 2023-09-14 NOTE — TELEPHONE ENCOUNTER
----- Message from [employer] sent at 9/13/2023  8:09 PM CDT -----  CT of the abdomen and pelvis shows fatty infiltration of the liver.   No other significant abnormality seen

## 2023-09-14 NOTE — TELEPHONE ENCOUNTER
Called patient, spoke with: Patient regarding the results of the patients most recent CT. I advised Patient of Dr. Vani Hassan recommendations.    Patient did voice understanding

## 2023-09-22 ENCOUNTER — OFFICE VISIT (OUTPATIENT)
Dept: FAMILY MEDICINE CLINIC | Age: 58
End: 2023-09-22
Payer: OTHER GOVERNMENT

## 2023-09-22 VITALS
DIASTOLIC BLOOD PRESSURE: 96 MMHG | BODY MASS INDEX: 37.52 KG/M2 | HEIGHT: 72 IN | SYSTOLIC BLOOD PRESSURE: 148 MMHG | TEMPERATURE: 97 F | OXYGEN SATURATION: 98 % | WEIGHT: 277 LBS | HEART RATE: 71 BPM

## 2023-09-22 DIAGNOSIS — R74.8 ELEVATED LIVER ENZYMES: ICD-10-CM

## 2023-09-22 DIAGNOSIS — E78.2 MIXED HYPERLIPIDEMIA: ICD-10-CM

## 2023-09-22 DIAGNOSIS — I10 PRIMARY HYPERTENSION: ICD-10-CM

## 2023-09-22 DIAGNOSIS — Z00.00 VISIT FOR PREVENTIVE HEALTH EXAMINATION: Primary | ICD-10-CM

## 2023-09-22 DIAGNOSIS — R60.9 PERIPHERAL EDEMA: ICD-10-CM

## 2023-09-22 DIAGNOSIS — I25.10 CORONARY ARTERY DISEASE INVOLVING NATIVE HEART WITHOUT ANGINA PECTORIS, UNSPECIFIED VESSEL OR LESION TYPE: ICD-10-CM

## 2023-09-22 PROCEDURE — 3080F DIAST BP >= 90 MM HG: CPT | Performed by: PEDIATRICS

## 2023-09-22 PROCEDURE — 3077F SYST BP >= 140 MM HG: CPT | Performed by: PEDIATRICS

## 2023-09-22 PROCEDURE — 99396 PREV VISIT EST AGE 40-64: CPT | Performed by: PEDIATRICS

## 2023-09-22 RX ORDER — HYDROCHLOROTHIAZIDE 12.5 MG/1
12.5 CAPSULE, GELATIN COATED ORAL EVERY MORNING
Qty: 90 CAPSULE | Refills: 3 | Status: SHIPPED | OUTPATIENT
Start: 2023-09-22

## 2023-09-22 ASSESSMENT — ENCOUNTER SYMPTOMS
VOMITING: 0
CONSTIPATION: 0
DIARRHEA: 0
SINUS PRESSURE: 0
GASTROINTESTINAL NEGATIVE: 1
SHORTNESS OF BREATH: 0
CHEST TIGHTNESS: 0
RESPIRATORY NEGATIVE: 1
ROS SKIN COMMENTS: NO NEW OR SUSPICIOUS SKIN LESIONS
BACK PAIN: 0
NAUSEA: 0
COUGH: 0
RHINORRHEA: 0
ABDOMINAL PAIN: 0
EYES NEGATIVE: 1
SORE THROAT: 0
TROUBLE SWALLOWING: 0
ALLERGIC/IMMUNOLOGIC NEGATIVE: 1
WHEEZING: 0

## 2023-09-22 ASSESSMENT — PATIENT HEALTH QUESTIONNAIRE - PHQ9
1. LITTLE INTEREST OR PLEASURE IN DOING THINGS: 0
SUM OF ALL RESPONSES TO PHQ QUESTIONS 1-9: 0
2. FEELING DOWN, DEPRESSED OR HOPELESS: 0
SUM OF ALL RESPONSES TO PHQ9 QUESTIONS 1 & 2: 0

## 2023-09-22 NOTE — PROGRESS NOTES
detail. 4. Coronary artery disease involving native heart without angina pectoris, unspecified vessel or lesion type  Stable from coronary standpoint. Continue present medication regimen. He is on an appropriate regimen for his diagnosis. The only risk factor not well controlled is blood pressure at this point    5. BMI 37.0-37.9,adult  Encourage exercise and moderation of volume of food as well as watching what he drinks    6. Elevated liver enzymes  Diet exercise would be helpful for this as it is most likely BECKMAN as his etiology for elevated liver enzymes    7. Peripheral edema  Hydrochlorothiazide should help in limiting his peripheral edema. This is most likely secondary to his amlodipine      No orders of the defined types were placed in this encounter. Return in about 6 months (around 3/22/2024) for 30.        This was an in-house visit

## 2023-12-17 DIAGNOSIS — I10 ESSENTIAL HYPERTENSION: ICD-10-CM

## 2023-12-18 RX ORDER — TICAGRELOR 60 MG/1
TABLET ORAL
Qty: 180 TABLET | Refills: 3 | Status: SHIPPED | OUTPATIENT
Start: 2023-12-18

## 2024-02-24 DIAGNOSIS — I10 ESSENTIAL HYPERTENSION: ICD-10-CM

## 2024-02-26 RX ORDER — LISINOPRIL 40 MG/1
TABLET ORAL
Qty: 90 TABLET | Refills: 3 | Status: SHIPPED | OUTPATIENT
Start: 2024-02-26

## 2024-02-27 ENCOUNTER — OFFICE VISIT (OUTPATIENT)
Dept: FAMILY MEDICINE CLINIC | Age: 59
End: 2024-02-27
Payer: OTHER GOVERNMENT

## 2024-02-27 VITALS
BODY MASS INDEX: 37.93 KG/M2 | SYSTOLIC BLOOD PRESSURE: 126 MMHG | HEART RATE: 78 BPM | DIASTOLIC BLOOD PRESSURE: 74 MMHG | TEMPERATURE: 97.9 F | HEIGHT: 72 IN | OXYGEN SATURATION: 98 % | WEIGHT: 280 LBS

## 2024-02-27 DIAGNOSIS — H66.002 NON-RECURRENT ACUTE SUPPURATIVE OTITIS MEDIA OF LEFT EAR WITHOUT SPONTANEOUS RUPTURE OF TYMPANIC MEMBRANE: Primary | ICD-10-CM

## 2024-02-27 DIAGNOSIS — H61.21 RIGHT EAR IMPACTED CERUMEN: ICD-10-CM

## 2024-02-27 PROCEDURE — 3074F SYST BP LT 130 MM HG: CPT | Performed by: NURSE PRACTITIONER

## 2024-02-27 PROCEDURE — 69209 REMOVE IMPACTED EAR WAX UNI: CPT | Performed by: NURSE PRACTITIONER

## 2024-02-27 PROCEDURE — 99213 OFFICE O/P EST LOW 20 MIN: CPT | Performed by: NURSE PRACTITIONER

## 2024-02-27 PROCEDURE — 3078F DIAST BP <80 MM HG: CPT | Performed by: NURSE PRACTITIONER

## 2024-02-27 RX ORDER — FLUTICASONE PROPIONATE 50 MCG
2 SPRAY, SUSPENSION (ML) NASAL DAILY
Qty: 16 G | Refills: 0 | Status: SHIPPED | OUTPATIENT
Start: 2024-02-27

## 2024-02-27 RX ORDER — AMOXICILLIN AND CLAVULANATE POTASSIUM 875; 125 MG/1; MG/1
1 TABLET, FILM COATED ORAL 2 TIMES DAILY
Qty: 20 TABLET | Refills: 0 | Status: SHIPPED | OUTPATIENT
Start: 2024-02-27 | End: 2024-03-08

## 2024-02-27 RX ORDER — PREDNISONE 10 MG/1
10 TABLET ORAL DAILY
Qty: 7 TABLET | Refills: 0 | Status: SHIPPED | OUTPATIENT
Start: 2024-02-27 | End: 2024-03-05

## 2024-02-27 ASSESSMENT — PATIENT HEALTH QUESTIONNAIRE - PHQ9
SUM OF ALL RESPONSES TO PHQ QUESTIONS 1-9: 0
2. FEELING DOWN, DEPRESSED OR HOPELESS: 0
1. LITTLE INTEREST OR PLEASURE IN DOING THINGS: 0
SUM OF ALL RESPONSES TO PHQ QUESTIONS 1-9: 0
SUM OF ALL RESPONSES TO PHQ9 QUESTIONS 1 & 2: 0

## 2024-02-27 ASSESSMENT — ENCOUNTER SYMPTOMS
GASTROINTESTINAL NEGATIVE: 1
RESPIRATORY NEGATIVE: 1
EYES NEGATIVE: 1

## 2024-02-27 NOTE — PROGRESS NOTES
[facility]  [address] [place] [address]  Dept: [phone]  Dept Fax: [phone]  Loc: [phone]    Delfin Silva is a 58 y.o. male who presents today for his medical conditions/complaints as noted below.  Delfin Silva is c/o of Ear Fullness (L)      Chief Complaint   Patient presents with    Ear Fullness     L       HPI:     HPI  Patient presents today with complaints of L ear fullness.  He states that this has been an issue for a little over a week. He has tried using drops to get rid of the wax, but nothing has changed.   Patient did have some sinus congestion 2 weeks ago and the issue started after that.     Past Medical History:   Diagnosis Date    ED (erectile dysfunction)     GERD (gastroesophageal reflux disease)     Heart attack (HCC) 2016    Hyperlipidemia     Hypertension         Past Surgical History:   Procedure Laterality Date    AMPUTATION Left     BACK SURGERY      CARDIAC SURGERY      CORONARY ANGIOPLASTY WITH STENT PLACEMENT  2016    SHOULDER SURGERY Right 2018    Dr Quinn       Social History     Tobacco Use    Smoking status: Former     Current packs/day: 0.00     Average packs/day: 1 pack/day for 25.0 years (25.0 ttl pk-yrs)     Types: Cigarettes     Start date:      Quit date: 2016     Years since quittin.1    Smokeless tobacco: Never   Substance Use Topics    Alcohol use: Yes     Comment: rare        Current Outpatient Medications   Medication Sig Dispense Refill    fluticasone (FLONASE) 50 MCG/ACT nasal spray 2 sprays by Each Nostril route daily 16 g 0    predniSONE (DELTASONE) 10 MG tablet Take 1 tablet by mouth daily for 7 days 7 tablet 0    amoxicillin-clavulanate (AUGMENTIN) 875-125 MG per tablet Take 1 tablet by mouth 2 times daily for 10 days 20 tablet 0    hydroCHLOROthiazide (MICROZIDE) 12.5 MG capsule Take 1 capsule by mouth every morning 90 capsule 3    amLODIPine (NORVASC) 5 MG tablet Take 1 tablet

## 2024-08-08 DIAGNOSIS — I10 ESSENTIAL HYPERTENSION: ICD-10-CM

## 2024-08-09 ENCOUNTER — TELEPHONE (OUTPATIENT)
Dept: FAMILY MEDICINE CLINIC | Age: 59
End: 2024-08-09

## 2024-08-09 DIAGNOSIS — Z00.00 LABORATORY EXAMINATION ORDERED AS PART OF A ROUTINE GENERAL MEDICAL EXAMINATION: ICD-10-CM

## 2024-08-09 DIAGNOSIS — Z00.00 VISIT FOR PREVENTIVE HEALTH EXAMINATION: Primary | ICD-10-CM

## 2024-08-09 RX ORDER — ROSUVASTATIN CALCIUM 10 MG/1
TABLET, COATED ORAL
Qty: 90 TABLET | Refills: 3 | Status: SHIPPED | OUTPATIENT
Start: 2024-08-09

## 2024-08-09 RX ORDER — AMLODIPINE BESYLATE 5 MG/1
5 TABLET ORAL DAILY
Qty: 90 TABLET | Refills: 3 | Status: SHIPPED | OUTPATIENT
Start: 2024-08-09

## 2024-08-09 NOTE — TELEPHONE ENCOUNTER
Received fax from pharmacy requesting refill on pts medication(s). Pt was last seen in office on 2/27/2024  and has a follow up scheduled for Visit date not found. Will send request to  Dr. Pacheco  for authorization.     Requested Prescriptions     Pending Prescriptions Disp Refills    amLODIPine (NORVASC) 5 MG tablet [Pharmacy Med Name: AMLODIPINE BESYLATE TABS 5MG] 90 tablet 3     Sig: TAKE 1 TABLET DAILY

## 2024-08-14 ENCOUNTER — TELEPHONE (OUTPATIENT)
Dept: FAMILY MEDICINE CLINIC | Age: 59
End: 2024-08-14

## 2024-08-14 DIAGNOSIS — Z00.00 LABORATORY EXAMINATION ORDERED AS PART OF A ROUTINE GENERAL MEDICAL EXAMINATION: ICD-10-CM

## 2024-08-14 LAB
ALBUMIN SERPL-MCNC: 4.3 G/DL (ref 3.5–5.2)
ALP SERPL-CCNC: 61 U/L (ref 40–130)
ALT SERPL-CCNC: 67 U/L (ref 5–41)
ANION GAP SERPL CALCULATED.3IONS-SCNC: 15 MMOL/L (ref 7–19)
AST SERPL-CCNC: 61 U/L (ref 5–40)
BASOPHILS # BLD: 0.1 K/UL (ref 0–0.2)
BASOPHILS NFR BLD: 0.7 % (ref 0–1)
BILIRUB SERPL-MCNC: 0.6 MG/DL (ref 0.2–1.2)
BUN SERPL-MCNC: 13 MG/DL (ref 8–23)
CALCIUM SERPL-MCNC: 9.2 MG/DL (ref 8.8–10.2)
CHLORIDE SERPL-SCNC: 105 MMOL/L (ref 98–111)
CHOLEST SERPL-MCNC: 113 MG/DL (ref 160–199)
CO2 SERPL-SCNC: 23 MMOL/L (ref 22–29)
CREAT SERPL-MCNC: 1 MG/DL (ref 0.5–1.2)
CREAT UR-MCNC: 251.1 MG/DL (ref 39–259)
EOSINOPHIL # BLD: 0.4 K/UL (ref 0–0.6)
EOSINOPHIL NFR BLD: 5 % (ref 0–5)
ERYTHROCYTE [DISTWIDTH] IN BLOOD BY AUTOMATED COUNT: 13.7 % (ref 11.5–14.5)
GLUCOSE SERPL-MCNC: 154 MG/DL (ref 74–109)
HBA1C MFR BLD: 7.2 % (ref 4–6)
HCT VFR BLD AUTO: 44.4 % (ref 42–52)
HDLC SERPL-MCNC: 18 MG/DL (ref 55–121)
HGB BLD-MCNC: 14.2 G/DL (ref 14–18)
IMM GRANULOCYTES # BLD: 0.1 K/UL
LDLC SERPL CALC-MCNC: 49 MG/DL
LYMPHOCYTES # BLD: 2 K/UL (ref 1.1–4.5)
LYMPHOCYTES NFR BLD: 27.7 % (ref 20–40)
MCH RBC QN AUTO: 27.6 PG (ref 27–31)
MCHC RBC AUTO-ENTMCNC: 32 G/DL (ref 33–37)
MCV RBC AUTO: 86.4 FL (ref 80–94)
MICROALBUMIN UR-MCNC: <1.2 MG/DL (ref 0–19)
MICROALBUMIN/CREAT UR-RTO: NORMAL MG/G
MONOCYTES # BLD: 0.5 K/UL (ref 0–0.9)
MONOCYTES NFR BLD: 6.9 % (ref 0–10)
NEUTROPHILS # BLD: 4.3 K/UL (ref 1.5–7.5)
NEUTS SEG NFR BLD: 59 % (ref 50–65)
PLATELET # BLD AUTO: 268 K/UL (ref 130–400)
PMV BLD AUTO: 10.2 FL (ref 9.4–12.4)
POTASSIUM SERPL-SCNC: 4.6 MMOL/L (ref 3.5–5)
PROT SERPL-MCNC: 6.9 G/DL (ref 6.6–8.7)
RBC # BLD AUTO: 5.14 M/UL (ref 4.7–6.1)
SODIUM SERPL-SCNC: 143 MMOL/L (ref 136–145)
T4 FREE SERPL-MCNC: 1.07 NG/DL (ref 0.93–1.7)
TRIGL SERPL-MCNC: 228 MG/DL (ref 0–149)
TSH SERPL DL<=0.005 MIU/L-ACNC: 1.67 UIU/ML (ref 0.27–4.2)
WBC # BLD AUTO: 7.3 K/UL (ref 4.8–10.8)

## 2024-08-14 NOTE — TELEPHONE ENCOUNTER
----- Message from Dr. ALBERTO Pacheco DO sent at 8/14/2024  2:46 PM CDT -----  Lipids are excellently controlled.  Your metabolic profile is normal.  This includes kidney and liver functions as well as electrolytes.  Blood sugar was elevated at 154 at the time of the lab draw.  Liver enzymes continue to be elevated.  This most likely reflects fatty infiltration in the liver.  Recommendation would be a low-fat low-cholesterol diet and increase exercise with attempts to obtain weight loss.  Hemoglobin A1c is 7.2 which indicates poor blood sugar control over the past 3 months.  Recommend scheduling appointment so we can discuss medications for your diabetes.    Your WBC, (infection fighting ability) Hgb and Hct, (oxygen carrying cells) are normal; as is your percentage of each cell type.  There is no significant protein excretion in the urine.  Thyroid is normal

## 2024-08-21 DIAGNOSIS — E78.2 MIXED HYPERLIPIDEMIA: ICD-10-CM

## 2024-08-21 DIAGNOSIS — I10 ESSENTIAL HYPERTENSION: ICD-10-CM

## 2024-08-22 RX ORDER — FENOFIBRATE 160 MG/1
160 TABLET ORAL DAILY
Qty: 90 TABLET | Refills: 3 | Status: SHIPPED | OUTPATIENT
Start: 2024-08-22

## 2024-08-22 NOTE — TELEPHONE ENCOUNTER
Received fax from pharmacy requesting refill on pts medication(s). Pt was last seen in office on 2/27/2024  and has a follow up scheduled for 9/20/2024. Will send request to  Dr. Pacheco  for authorization.     Requested Prescriptions     Pending Prescriptions Disp Refills    fenofibrate (TRIGLIDE) 160 MG tablet [Pharmacy Med Name: FENOFIBRATE TABS 160MG] 90 tablet 3     Sig: TAKE 1 TABLET DAILY    metoprolol tartrate (LOPRESSOR) 25 MG tablet [Pharmacy Med Name: METOPROLOL TARTRATE TABS 25MG] 180 tablet 3     Sig: TAKE 1 TABLET TWICE A DAY

## 2024-08-22 NOTE — PROGRESS NOTES
Subjective:     Encounter Date:08/23/2024      Patient ID: Eduardo Altman is a 59 y.o. male with known coronary artery disease, previous AMI 2016 and 2021, hypertension, hyperlipidemia, gastroesophageal reflux disease, and former smoker who presents to the office today for routine follow up.     Chief Complaint: Routine CAD follow up   Coronary Artery Disease  Presents for follow-up visit. Symptoms include leg swelling (bilateral mild ankle swelling). Pertinent negatives include no chest pain, chest pressure, chest tightness, dizziness, muscle weakness, palpitations, shortness of breath or weight gain. Risk factors include hyperlipidemia and obesity. The symptoms have been stable. Compliance with diet is good. Compliance with exercise is good. Compliance with medications is good.   Hypertension  This is a chronic problem. The current episode started more than 1 year ago. The problem has been resolved since onset. The problem is controlled. Associated symptoms include peripheral edema ( mild). Pertinent negatives include no chest pain, headaches, malaise/fatigue, orthopnea, palpitations, PND or shortness of breath. Risk factors for coronary artery disease include dyslipidemia, male gender, obesity and diabetes mellitus. Current antihypertension treatment includes ACE inhibitors, beta blockers and calcium channel blockers. The current treatment provides significant improvement. Compliance problems include diet and exercise.  Hypertensive end-organ damage includes CAD/MI. There is no history of angina, kidney disease or heart failure.   Hyperlipidemia  This is a chronic problem. The current episode started more than 1 year ago. The problem is controlled. Recent lipid tests were reviewed and are low. Exacerbating diseases include obesity. He has no history of diabetes. Factors aggravating his hyperlipidemia include beta blockers and fatty foods. Pertinent negatives include no chest pain, focal weakness, myalgias or  shortness of breath. Current antihyperlipidemic treatment includes statins. The current treatment provides significant improvement of lipids. Compliance problems include medication side effects, adherence to exercise and adherence to diet (rash).  Risk factors for coronary artery disease include dyslipidemia, male sex, hypertension, obesity and diabetes mellitus.       Mr. Altman presents to the office today for routine follow up.  He reports to be stable from his last visit.  He has no complaints of chest pain or pressure. He has some shortness of breath noted with heavy exertion but tolerates activity generally. He has stable mild peripheral edema, which may be related to his amlodipine as we have discussed this is in the past and his swelling is stable and unchanged.     He tells me that he still has not made lifestyle changes. He does not exercise or eat healthy. He endorses not drinking water routinely and generally drinks large amounts of sweet tea. He is compliant with taking his medications. He recently had labs performed prior to seeing his PCP. His Hgb A1c is elevated > 7%.       The following portions of the patient's history were reviewed and updated as appropriate: allergies, current medications, past family history, past medical history, past social history and past surgical history.     Allergies   Allergen Reactions    Atorvastatin Itching       Current Outpatient Medications:     amLODIPine (NORVASC) 5 MG tablet, Take 1 tablet by mouth Daily., Disp: , Rfl:     ascorbic acid (VITAMIN C) 500 MG tablet, Take 2 tablets by mouth., Disp: , Rfl:     aspirin 81 MG chewable tablet, Chew 1 tablet Daily., Disp: , Rfl:     Brilinta 60 MG tablet tablet, TAKE 1 TABLET TWICE A DAY, Disp: 180 tablet, Rfl: 3    Cholecalciferol (VITAMIN D3 PO), Take  by mouth., Disp: , Rfl:     fenofibrate 160 MG tablet, Take 1 tablet by mouth Daily., Disp: , Rfl:     fluticasone (FLONASE) 50 MCG/ACT nasal spray, 2 sprays into the  nostril(s) as directed by provider Daily., Disp: , Rfl:     hydroCHLOROthiazide (MICROZIDE) 12.5 MG capsule, Take 1 capsule by mouth Every Morning., Disp: , Rfl:     lisinopril (PRINIVIL,ZESTRIL) 40 MG tablet, TAKE 1 TABLET DAILY, Disp: 90 tablet, Rfl: 3    metoprolol tartrate (LOPRESSOR) 25 MG tablet, TAKE 1 TABLET EVERY 12 HOURS, Disp: 180 tablet, Rfl: 3    nitroglycerin (NITROSTAT) 0.4 MG SL tablet, Place 1 tablet under the tongue Every 5 (Five) Minutes As Needed for Chest Pain. Take no more than 3 doses in 15 minutes., Disp: 25 tablet, Rfl: 1    pantoprazole (PROTONIX) 40 MG EC tablet, Take 1 tablet by mouth Daily., Disp: , Rfl:     rosuvastatin (CRESTOR) 10 MG tablet, TAKE 1 TABLET DAILY, Disp: 90 tablet, Rfl: 3    Review of Systems   Constitutional: Negative for malaise/fatigue, weight gain and weight loss.   Cardiovascular:  Positive for dyspnea on exertion and leg swelling (bilateral mild ankle swelling). Negative for chest pain, irregular heartbeat, near-syncope, orthopnea, palpitations, paroxysmal nocturnal dyspnea and syncope.   Respiratory:  Negative for chest tightness, cough, shortness of breath and wheezing.    Hematologic/Lymphatic: Negative for bleeding problem. Bruises/bleeds easily.   Musculoskeletal:  Negative for muscle weakness and myalgias.   Gastrointestinal:  Negative for bloating and nausea.   Neurological:  Negative for dizziness, focal weakness, headaches and light-headedness.   Psychiatric/Behavioral:  Negative for altered mental status.          ECG 12 Lead    Date/Time: 8/23/2024 8:36 AM  Performed by: Pamela Maxwell APRN    Authorized by: Pamela Maxwell APRN  Comparison: compared with previous ECG from 8/25/2023  Similar to previous ECG  Rhythm: sinus rhythm  Rate: normal  BPM: 66  Conduction: conduction normal  Q waves: V2 and V3    QRS axis: left    Clinical impression: abnormal EKG             Objective:     Vitals reviewed.   Constitutional:       General: Awake. Not in acute  "distress.     Appearance: Normal and healthy appearance. Well-developed, well-groomed and not in distress. Obese. Not diaphoretic.   Eyes:      General:         Right eye: No discharge.         Left eye: No discharge.   HENT:      Head: Normocephalic and atraumatic.    Mouth/Throat:      Pharynx: No oropharyngeal exudate.   Pulmonary:      Effort: Pulmonary effort is normal. No respiratory distress.      Breath sounds: Normal breath sounds. No wheezing. No rhonchi. No rales.   Cardiovascular:      Normal rate. Regular rhythm.      Murmurs: There is no murmur.      No gallop.  No rub.   Edema:     Peripheral edema absent.   Musculoskeletal: Normal range of motion.      Cervical back: Normal range of motion and neck supple. Skin:     General: Skin is warm and dry.      Coloration: Skin is not pale.      Findings: No erythema or rash.   Neurological:      Mental Status: Alert, oriented to person, place, and time and oriented to person, place and time.   Psychiatric:         Attention and Perception: Attention normal.         Mood and Affect: Mood normal.         Speech: Speech normal.         Behavior: Behavior normal. Behavior is cooperative.         Thought Content: Thought content normal.         Cognition and Memory: Cognition normal.         Judgment: Judgment normal.       /83   Pulse 66   Ht 188 cm (74\")   Wt 127 kg (280 lb)   BMI 35.95 kg/m²     Lab Review:     Lab Results   Component Value Date    GLUCOSE 154 (H) 08/14/2024    BUN 13 08/14/2024    CREATININE 1.0 08/14/2024    BCR 17.4 02/02/2021    K 4.6 08/14/2024    CO2 23 08/14/2024    CALCIUM 9.2 08/14/2024    ALBUMIN 4.3 08/14/2024    BILITOT 0.6 08/14/2024    AST 61 (H) 08/14/2024    ALT 67 (H) 08/14/2024     Lab Results   Component Value Date    CHOL 129 02/01/2021    CHLPL 104 (L) 08/21/2023    TRIG 195 (H) 08/21/2023    HDL 19 (L) 08/21/2023    LDL 46 08/21/2023     Lab Results   Component Value Date    HGBA1C 7.2 (H) 08/14/2024 "       Results for orders placed during the hospital encounter of 02/01/21    Adult Transthoracic Echo Complete W/ Cont if Necessary Per Protocol    Interpretation Summary  · Left ventricular wall thickness is consistent with concentric hypertrophy.  · The following left ventricular wall segments are hypokinetic: apical lateral, apical septal and apex hypokinetic.  · Estimated left ventricular EF = 50% Left ventricular systolic function is normal.  · Left ventricular diastolic function is consistent with (grade I) impaired relaxation.    Cardiac catheterization 2/1/2021  Impression:  1.  Acute anterior STEMI, secondary to proximal LAD occlusion, just proximal to previously placed stent.  2.  Successful PCI to the proximal mid left anterior descending artery as outlined above.  3.  Severe stenosis in the first obtuse marginal branch, left to be medically managed at this time.      Assessment:          Diagnosis Plan   1. Coronary artery disease involving native coronary artery of native heart without angina pectoris        2. Essential hypertension        3. Hyperlipidemia LDL goal <70        4. History of ST elevation myocardial infarction (STEMI)        5. Class 2 severe obesity due to excess calories with serious comorbidity and body mass index (BMI) of 35.0 to 35.9 in adult        6. Type 2 diabetes mellitus with hyperglycemia, without long-term current use of insulin                 Plan:       - Coronary artery disease: Stable. History of CAD with previous AMI in 2016 and more recently in February 2021.  Currently on dual antiplatelet therapy with aspirin and low-dose Brilinta.  He remains on beta blocker and statin therapies.  He is feeling well without complaints.    - Hypertension: Continue on home medications for blood pressure control. He may have some mild lower leg edema from amlodipine but this is stable from last year.     - Hyperlipidemia: LDL goal < 70 given known CAD. Continue use of crestor. LDL  well controlled. AST/ALT mildly elevated, PCP monitoring.     - ST elevation myocardial infarction: Anterior STEMI February 2021 with PCI per Dr. Jeffrey.  Xience Miguelina drug-eluting stent 3.0 x 23 mm Xience Miguelina drug-eluting stent 2.75 x 23 mm, both to the LAD.    - Obesity: BMI 35.95. Diet and exercise recommended.      - type II diabetes mellitus: new diagnosis. Has appt with PCP likely will recommend diet and lifestyle changes. May consider metformin or addition of Jardiance.       Follow Up with PCP due to recent labs and hgb A1c result  Continue current cardiac medications  Consider Jardiance.   Diet and exercise recommended. Weight loss.    I attest that all portions of this note have been reviewed and updated to reflect the patient's current status.

## 2024-08-23 ENCOUNTER — OFFICE VISIT (OUTPATIENT)
Dept: CARDIOLOGY | Facility: CLINIC | Age: 59
End: 2024-08-23
Payer: OTHER GOVERNMENT

## 2024-08-23 VITALS
HEIGHT: 74 IN | SYSTOLIC BLOOD PRESSURE: 130 MMHG | HEART RATE: 66 BPM | BODY MASS INDEX: 35.94 KG/M2 | WEIGHT: 280 LBS | DIASTOLIC BLOOD PRESSURE: 83 MMHG

## 2024-08-23 DIAGNOSIS — E78.5 HYPERLIPIDEMIA LDL GOAL <70: Chronic | ICD-10-CM

## 2024-08-23 DIAGNOSIS — I25.10 CORONARY ARTERY DISEASE INVOLVING NATIVE CORONARY ARTERY OF NATIVE HEART WITHOUT ANGINA PECTORIS: Primary | Chronic | ICD-10-CM

## 2024-08-23 DIAGNOSIS — E11.65 TYPE 2 DIABETES MELLITUS WITH HYPERGLYCEMIA, WITHOUT LONG-TERM CURRENT USE OF INSULIN: ICD-10-CM

## 2024-08-23 DIAGNOSIS — I25.2 HISTORY OF ST ELEVATION MYOCARDIAL INFARCTION (STEMI): Chronic | ICD-10-CM

## 2024-08-23 DIAGNOSIS — I10 ESSENTIAL HYPERTENSION: Chronic | ICD-10-CM

## 2024-08-23 DIAGNOSIS — E66.01 CLASS 2 SEVERE OBESITY DUE TO EXCESS CALORIES WITH SERIOUS COMORBIDITY AND BODY MASS INDEX (BMI) OF 35.0 TO 35.9 IN ADULT: ICD-10-CM

## 2024-08-23 PROCEDURE — 93000 ELECTROCARDIOGRAM COMPLETE: CPT | Performed by: NURSE PRACTITIONER

## 2024-08-23 PROCEDURE — 99214 OFFICE O/P EST MOD 30 MIN: CPT | Performed by: NURSE PRACTITIONER

## 2024-08-23 RX ORDER — FLUTICASONE PROPIONATE 50 MCG
2 SPRAY, SUSPENSION (ML) NASAL DAILY
COMMUNITY
Start: 2024-02-27

## 2024-08-23 RX ORDER — HYDROCHLOROTHIAZIDE 12.5 MG/1
12.5 CAPSULE, GELATIN COATED ORAL EVERY MORNING
COMMUNITY
Start: 2024-06-29

## 2024-09-20 ENCOUNTER — OFFICE VISIT (OUTPATIENT)
Dept: FAMILY MEDICINE CLINIC | Age: 59
End: 2024-09-20
Payer: OTHER GOVERNMENT

## 2024-09-20 VITALS
TEMPERATURE: 97.6 F | BODY MASS INDEX: 37.52 KG/M2 | HEIGHT: 72 IN | SYSTOLIC BLOOD PRESSURE: 116 MMHG | WEIGHT: 277 LBS | OXYGEN SATURATION: 96 % | HEART RATE: 73 BPM | DIASTOLIC BLOOD PRESSURE: 60 MMHG

## 2024-09-20 DIAGNOSIS — E11.9 TYPE 2 DIABETES MELLITUS WITHOUT COMPLICATION, WITHOUT LONG-TERM CURRENT USE OF INSULIN (HCC): ICD-10-CM

## 2024-09-20 DIAGNOSIS — Z12.5 ENCOUNTER FOR SCREENING FOR MALIGNANT NEOPLASM OF PROSTATE: ICD-10-CM

## 2024-09-20 DIAGNOSIS — I10 PRIMARY HYPERTENSION: ICD-10-CM

## 2024-09-20 DIAGNOSIS — R74.8 ELEVATED LIVER ENZYMES: ICD-10-CM

## 2024-09-20 DIAGNOSIS — Z00.00 VISIT FOR PREVENTIVE HEALTH EXAMINATION: Primary | ICD-10-CM

## 2024-09-20 DIAGNOSIS — E78.2 MIXED HYPERLIPIDEMIA: ICD-10-CM

## 2024-09-20 DIAGNOSIS — I25.10 CORONARY ARTERY DISEASE INVOLVING NATIVE HEART WITHOUT ANGINA PECTORIS, UNSPECIFIED VESSEL OR LESION TYPE: ICD-10-CM

## 2024-09-20 PROCEDURE — 3078F DIAST BP <80 MM HG: CPT | Performed by: PEDIATRICS

## 2024-09-20 PROCEDURE — 99396 PREV VISIT EST AGE 40-64: CPT | Performed by: PEDIATRICS

## 2024-09-20 PROCEDURE — 3074F SYST BP LT 130 MM HG: CPT | Performed by: PEDIATRICS

## 2024-09-20 RX ORDER — LANCETS 30 GAUGE
1 EACH MISCELLANEOUS DAILY
Qty: 100 EACH | Refills: 0 | Status: SHIPPED | OUTPATIENT
Start: 2024-09-20

## 2024-09-20 RX ORDER — GLUCOSAMINE HCL/CHONDROITIN SU 500-400 MG
CAPSULE ORAL
Qty: 100 STRIP | Refills: 3 | Status: SHIPPED | OUTPATIENT
Start: 2024-09-20

## 2024-09-20 RX ORDER — BLOOD-GLUCOSE METER
1 KIT MISCELLANEOUS DAILY
Qty: 1 KIT | Refills: 0 | Status: SHIPPED | OUTPATIENT
Start: 2024-09-20

## 2024-09-20 SDOH — ECONOMIC STABILITY: FOOD INSECURITY: WITHIN THE PAST 12 MONTHS, YOU WORRIED THAT YOUR FOOD WOULD RUN OUT BEFORE YOU GOT MONEY TO BUY MORE.: NEVER TRUE

## 2024-09-20 SDOH — ECONOMIC STABILITY: FOOD INSECURITY: WITHIN THE PAST 12 MONTHS, THE FOOD YOU BOUGHT JUST DIDN'T LAST AND YOU DIDN'T HAVE MONEY TO GET MORE.: NEVER TRUE

## 2024-09-20 SDOH — ECONOMIC STABILITY: INCOME INSECURITY: HOW HARD IS IT FOR YOU TO PAY FOR THE VERY BASICS LIKE FOOD, HOUSING, MEDICAL CARE, AND HEATING?: NOT HARD AT ALL

## 2024-09-27 DIAGNOSIS — I10 PRIMARY HYPERTENSION: ICD-10-CM

## 2024-09-27 NOTE — TELEPHONE ENCOUNTER
Received fax from pharmacy requesting refill on pts medication(s). Pt was last seen in office on 9/20/2024  and has a follow up scheduled for Visit date not found. Will send request to  Dr. Pacheco  for authorization.     Requested Prescriptions     Pending Prescriptions Disp Refills    hydroCHLOROthiazide 12.5 MG capsule [Pharmacy Med Name: HYDROCHLOROTHIAZIDE CAPS 12.5MG] 90 capsule 3     Sig: TAKE 1 CAPSULE EVERY MORNING

## 2024-09-30 RX ORDER — HYDROCHLOROTHIAZIDE 12.5 MG/1
12.5 CAPSULE ORAL EVERY MORNING
Qty: 90 CAPSULE | Refills: 3 | Status: SHIPPED | OUTPATIENT
Start: 2024-09-30

## 2025-01-03 DIAGNOSIS — I10 ESSENTIAL HYPERTENSION: ICD-10-CM

## 2025-01-06 RX ORDER — TICAGRELOR 60 MG/1
TABLET ORAL
Qty: 180 TABLET | Refills: 3 | Status: SHIPPED | OUTPATIENT
Start: 2025-01-06

## 2025-02-08 DIAGNOSIS — I10 ESSENTIAL HYPERTENSION: ICD-10-CM

## 2025-02-10 RX ORDER — LISINOPRIL 40 MG/1
TABLET ORAL
Qty: 90 TABLET | Refills: 3 | Status: SHIPPED | OUTPATIENT
Start: 2025-02-10

## 2025-02-11 DIAGNOSIS — K21.9 GASTROESOPHAGEAL REFLUX DISEASE, UNSPECIFIED WHETHER ESOPHAGITIS PRESENT: ICD-10-CM

## 2025-02-11 RX ORDER — PANTOPRAZOLE SODIUM 40 MG/1
40 TABLET, DELAYED RELEASE ORAL DAILY
Qty: 90 TABLET | Refills: 3 | Status: SHIPPED | OUTPATIENT
Start: 2025-02-11

## 2025-02-11 NOTE — TELEPHONE ENCOUNTER
Received call from patient requesting refill on medication. Pt was last seen in office on 09/20/2024 for a physical and has a follow up scheduled for Visit date not found.     Will send request to provider for authorization.     Requested Prescriptions     Pending Prescriptions Disp Refills    pantoprazole (PROTONIX) 40 MG tablet 90 tablet 3     Sig: Take 1 tablet by mouth daily

## 2025-07-29 ENCOUNTER — TELEPHONE (OUTPATIENT)
Age: 60
End: 2025-07-29

## 2025-07-29 DIAGNOSIS — Z12.5 SCREENING FOR PROSTATE CANCER: ICD-10-CM

## 2025-07-29 DIAGNOSIS — Z00.00 LABORATORY EXAMINATION ORDERED AS PART OF A ROUTINE GENERAL MEDICAL EXAMINATION: Primary | ICD-10-CM

## 2025-07-29 NOTE — TELEPHONE ENCOUNTER
Patient called requesting lab orders for his upcoming appointment in September.     Will send to provider for approval.

## 2025-08-19 DIAGNOSIS — Z00.00 LABORATORY EXAMINATION ORDERED AS PART OF A ROUTINE GENERAL MEDICAL EXAMINATION: ICD-10-CM

## 2025-08-19 DIAGNOSIS — Z12.5 SCREENING FOR PROSTATE CANCER: ICD-10-CM

## 2025-08-19 LAB
ALBUMIN SERPL-MCNC: 4.6 G/DL (ref 3.5–5.2)
ALP SERPL-CCNC: 39 U/L (ref 40–129)
ALT SERPL-CCNC: 49 U/L (ref 10–50)
ANION GAP SERPL CALCULATED.3IONS-SCNC: 11 MMOL/L (ref 8–16)
AST SERPL-CCNC: 41 U/L (ref 10–50)
BASOPHILS # BLD: 0.1 K/UL (ref 0–0.2)
BASOPHILS NFR BLD: 0.5 % (ref 0–1)
BILIRUB SERPL-MCNC: 0.5 MG/DL (ref 0.2–1.2)
BUN SERPL-MCNC: 22 MG/DL (ref 8–23)
CALCIUM SERPL-MCNC: 9.8 MG/DL (ref 8.8–10.2)
CHLORIDE SERPL-SCNC: 106 MMOL/L (ref 98–107)
CHOLEST SERPL-MCNC: 110 MG/DL (ref 0–199)
CO2 SERPL-SCNC: 25 MMOL/L (ref 22–29)
CREAT SERPL-MCNC: 1.3 MG/DL (ref 0.7–1.2)
CREAT UR-MCNC: 136 MG/DL (ref 39–259)
EOSINOPHIL # BLD: 0.4 K/UL (ref 0–0.6)
EOSINOPHIL NFR BLD: 4.7 % (ref 0–5)
ERYTHROCYTE [DISTWIDTH] IN BLOOD BY AUTOMATED COUNT: 13.8 % (ref 11.5–14.5)
GLUCOSE SERPL-MCNC: 112 MG/DL (ref 70–99)
HBA1C MFR BLD: 6.3 % (ref 4–5.6)
HCT VFR BLD AUTO: 46.1 % (ref 42–52)
HDLC SERPL-MCNC: 24 MG/DL (ref 40–60)
HGB BLD-MCNC: 14.5 G/DL (ref 14–18)
IMM GRANULOCYTES # BLD: 0.1 K/UL
LDLC SERPL CALC-MCNC: 37 MG/DL
LYMPHOCYTES # BLD: 2.6 K/UL (ref 1.1–4.5)
LYMPHOCYTES NFR BLD: 27.5 % (ref 20–40)
MCH RBC QN AUTO: 27.2 PG (ref 27–31)
MCHC RBC AUTO-ENTMCNC: 31.5 G/DL (ref 33–37)
MCV RBC AUTO: 86.3 FL (ref 80–94)
MICROALBUMIN UR-MCNC: <1.2 MG/DL (ref 0–1.99)
MICROALBUMIN/CREAT UR-RTO: NORMAL MG/G (ref 0–29)
MONOCYTES # BLD: 0.7 K/UL (ref 0–0.9)
MONOCYTES NFR BLD: 6.9 % (ref 0–10)
NEUTROPHILS # BLD: 5.7 K/UL (ref 1.5–7.5)
NEUTS SEG NFR BLD: 59.9 % (ref 50–65)
PLATELET # BLD AUTO: 281 K/UL (ref 130–400)
PMV BLD AUTO: 10.1 FL (ref 9.4–12.4)
POTASSIUM SERPL-SCNC: 5 MMOL/L (ref 3.5–5.1)
PROT SERPL-MCNC: 7.1 G/DL (ref 6.4–8.3)
PSA SERPL-MCNC: 0.65 NG/ML (ref 0–4)
RBC # BLD AUTO: 5.34 M/UL (ref 4.7–6.1)
SODIUM SERPL-SCNC: 142 MMOL/L (ref 136–145)
T4 FREE SERPL-MCNC: 1.05 NG/DL (ref 0.93–1.7)
TRIGL SERPL-MCNC: 244 MG/DL (ref 0–149)
TSH SERPL DL<=0.005 MIU/L-ACNC: 2.3 UIU/ML (ref 0.27–4.2)
WBC # BLD AUTO: 9.5 K/UL (ref 4.8–10.8)

## 2025-08-25 ENCOUNTER — OFFICE VISIT (OUTPATIENT)
Dept: CARDIOLOGY | Facility: CLINIC | Age: 60
End: 2025-08-25
Payer: OTHER GOVERNMENT

## 2025-08-25 VITALS
OXYGEN SATURATION: 99 % | HEART RATE: 72 BPM | WEIGHT: 264 LBS | HEIGHT: 74 IN | BODY MASS INDEX: 33.88 KG/M2 | DIASTOLIC BLOOD PRESSURE: 70 MMHG | SYSTOLIC BLOOD PRESSURE: 132 MMHG

## 2025-08-25 DIAGNOSIS — I25.2 HISTORY OF ST ELEVATION MYOCARDIAL INFARCTION (STEMI): Chronic | ICD-10-CM

## 2025-08-25 DIAGNOSIS — E66.09 CLASS 1 OBESITY DUE TO EXCESS CALORIES WITH SERIOUS COMORBIDITY AND BODY MASS INDEX (BMI) OF 33.0 TO 33.9 IN ADULT: ICD-10-CM

## 2025-08-25 DIAGNOSIS — I25.10 CORONARY ARTERY DISEASE INVOLVING NATIVE CORONARY ARTERY OF NATIVE HEART WITHOUT ANGINA PECTORIS: Primary | Chronic | ICD-10-CM

## 2025-08-25 DIAGNOSIS — I10 ESSENTIAL HYPERTENSION: Chronic | ICD-10-CM

## 2025-08-25 DIAGNOSIS — E66.811 CLASS 1 OBESITY DUE TO EXCESS CALORIES WITH SERIOUS COMORBIDITY AND BODY MASS INDEX (BMI) OF 33.0 TO 33.9 IN ADULT: ICD-10-CM

## 2025-08-25 DIAGNOSIS — E11.59 TYPE 2 DIABETES MELLITUS WITH OTHER CIRCULATORY COMPLICATION, WITHOUT LONG-TERM CURRENT USE OF INSULIN: ICD-10-CM

## 2025-08-25 PROCEDURE — 99214 OFFICE O/P EST MOD 30 MIN: CPT | Performed by: NURSE PRACTITIONER

## 2025-08-25 PROCEDURE — 93000 ELECTROCARDIOGRAM COMPLETE: CPT | Performed by: NURSE PRACTITIONER

## 2025-08-25 RX ORDER — EMPAGLIFLOZIN 10 MG/1
TABLET, FILM COATED ORAL
COMMUNITY
Start: 2025-06-01

## 2025-09-03 DIAGNOSIS — E78.2 MIXED HYPERLIPIDEMIA: ICD-10-CM

## 2025-09-03 DIAGNOSIS — I10 ESSENTIAL HYPERTENSION: ICD-10-CM

## 2025-09-03 RX ORDER — FENOFIBRATE 160 MG/1
160 TABLET ORAL DAILY
Qty: 90 TABLET | Refills: 3 | Status: SHIPPED | OUTPATIENT
Start: 2025-09-03

## 2025-09-03 RX ORDER — METOPROLOL TARTRATE 25 MG/1
25 TABLET, FILM COATED ORAL 2 TIMES DAILY
Qty: 180 TABLET | Refills: 3 | Status: SHIPPED | OUTPATIENT
Start: 2025-09-03

## 2025-09-03 RX ORDER — AMLODIPINE BESYLATE 5 MG/1
5 TABLET ORAL DAILY
Qty: 90 TABLET | Refills: 3 | Status: SHIPPED | OUTPATIENT
Start: 2025-09-03

## (undated) DEVICE — NC TREK CORONARY DILATATION CATHETER 3.5 MM X 12 MM / RAPID-EXCHANGE: Brand: NC TREK

## (undated) DEVICE — INF TL MULIPACK FR6: Brand: INFINITI

## (undated) DEVICE — PTCA DILATATION CATHETER: Brand: EMERGE™

## (undated) DEVICE — Device

## (undated) DEVICE — CANN CO2/O2 NASL A/

## (undated) DEVICE — Device: Brand: MEDEX

## (undated) DEVICE — SOL IRR NACL 0.9PCT BT 1000ML

## (undated) DEVICE — PK CATH CARD 30 CA/4

## (undated) DEVICE — Device: Brand: PROWATER

## (undated) DEVICE — SOLIDIFIER LIQUI LOC PLUS 2000CC

## (undated) DEVICE — 6F .070 XB 3.5 100CM: Brand: VISTA BRITE TIP

## (undated) DEVICE — NC TREK CORONARY DILATATION CATHETER 2.75 MM X 15 MM / RAPID-EXCHANGE: Brand: NC TREK

## (undated) DEVICE — PERCLOSE PROGLIDE™ SUTURE-MEDIATED CLOSURE SYSTEM: Brand: PERCLOSE PROGLIDE™

## (undated) DEVICE — KT VLV HEMO MAP ACC PLS LG/BORE MTL/INTRO

## (undated) DEVICE — DEV TORQ GW HOT/PINK

## (undated) DEVICE — ELECTRD PAD DEFIB A/

## (undated) DEVICE — INFLATION DEVICE: Brand: ENCORE™ 26

## (undated) DEVICE — PINNACLE INTRODUCER SHEATH: Brand: PINNACLE

## (undated) DEVICE — SKIN PREP TRAY W/CHG: Brand: MEDLINE INDUSTRIES, INC.